# Patient Record
Sex: MALE | Race: WHITE
[De-identification: names, ages, dates, MRNs, and addresses within clinical notes are randomized per-mention and may not be internally consistent; named-entity substitution may affect disease eponyms.]

---

## 2018-06-07 ENCOUNTER — HOSPITAL ENCOUNTER (OUTPATIENT)
Age: 73
End: 2018-06-07
Payer: MEDICARE

## 2018-06-07 DIAGNOSIS — R91.1: Primary | ICD-10-CM

## 2018-06-07 PROCEDURE — 71250 CT THORAX DX C-: CPT

## 2018-06-27 ENCOUNTER — HOSPITAL ENCOUNTER (OUTPATIENT)
Age: 73
End: 2018-06-27
Payer: MEDICARE

## 2018-06-27 DIAGNOSIS — F11.20: Primary | ICD-10-CM

## 2018-06-27 LAB
AMPHET UR-MCNC: NEGATIVE NG/ML
BARBITURATE URINE VISTA: NEGATIVE
BENZODIAZEPINE URINE VISTA: NEGATIVE
COCAINE URINE VISTA: NEGATIVE
DRUG CONFIRMATION TO FOLLOW?: (no result)
ECSTACY URINE VISTA: NEGATIVE
METHADONE URINE VISTA: NEGATIVE
PCP UR QL: NEGATIVE
PH UR: 5 [PH]
THC URINE VISTA: NEGATIVE

## 2018-06-27 PROCEDURE — 80307 DRUG TEST PRSMV CHEM ANLYZR: CPT

## 2019-02-08 ENCOUNTER — HOSPITAL ENCOUNTER (OUTPATIENT)
Age: 74
End: 2019-02-08
Payer: MEDICARE

## 2019-02-08 VITALS — BODY MASS INDEX: 33.9 KG/M2

## 2019-02-08 DIAGNOSIS — J44.9: Primary | ICD-10-CM

## 2019-02-08 DIAGNOSIS — R06.00: ICD-10-CM

## 2019-02-08 PROCEDURE — 71046 X-RAY EXAM CHEST 2 VIEWS: CPT

## 2019-03-19 ENCOUNTER — HOSPITAL ENCOUNTER (OUTPATIENT)
Age: 74
End: 2019-03-19
Payer: MEDICARE

## 2019-03-19 VITALS — BODY MASS INDEX: 33.9 KG/M2

## 2019-03-19 DIAGNOSIS — R06.00: ICD-10-CM

## 2019-03-19 DIAGNOSIS — R60.0: Primary | ICD-10-CM

## 2019-03-19 PROCEDURE — 93306 TTE W/DOPPLER COMPLETE: CPT

## 2019-03-19 PROCEDURE — 93970 EXTREMITY STUDY: CPT

## 2019-03-21 ENCOUNTER — HOSPITAL ENCOUNTER (OUTPATIENT)
Age: 74
End: 2019-03-21
Payer: MEDICARE

## 2019-03-21 VITALS — BODY MASS INDEX: 33.9 KG/M2

## 2019-03-21 DIAGNOSIS — R06.00: Primary | ICD-10-CM

## 2019-03-21 DIAGNOSIS — I27.20: ICD-10-CM

## 2019-03-21 DIAGNOSIS — R60.0: ICD-10-CM

## 2019-03-21 PROCEDURE — C8928 TTE W OR W/O FOL W/CON,STRES: HCPCS

## 2019-03-21 PROCEDURE — A4216 STERILE WATER/SALINE, 10 ML: HCPCS

## 2019-03-21 PROCEDURE — 93350 STRESS TTE ONLY: CPT

## 2019-03-21 PROCEDURE — 93017 CV STRESS TEST TRACING ONLY: CPT

## 2019-04-11 ENCOUNTER — HOSPITAL ENCOUNTER (OUTPATIENT)
Age: 74
End: 2019-04-11
Payer: MEDICARE

## 2019-04-11 VITALS — BODY MASS INDEX: 33.9 KG/M2

## 2019-04-11 DIAGNOSIS — M54.9: Primary | ICD-10-CM

## 2019-04-11 PROCEDURE — 72100 X-RAY EXAM L-S SPINE 2/3 VWS: CPT

## 2019-05-25 ENCOUNTER — HOSPITAL ENCOUNTER (INPATIENT)
Dept: HOSPITAL 100 - ED | Age: 74
LOS: 1 days | Discharge: ANOTHER HOSPITAL | DRG: 389 | End: 2019-05-26
Payer: MEDICARE

## 2019-05-25 VITALS
HEART RATE: 104 BPM | DIASTOLIC BLOOD PRESSURE: 75 MMHG | OXYGEN SATURATION: 95 % | SYSTOLIC BLOOD PRESSURE: 139 MMHG | RESPIRATION RATE: 18 BRPM

## 2019-05-25 VITALS
DIASTOLIC BLOOD PRESSURE: 73 MMHG | HEART RATE: 119 BPM | BODY MASS INDEX: 37.2 KG/M2 | SYSTOLIC BLOOD PRESSURE: 130 MMHG | OXYGEN SATURATION: 90 % | BODY MASS INDEX: 36.2 KG/M2 | TEMPERATURE: 96.6 F | RESPIRATION RATE: 18 BRPM

## 2019-05-25 VITALS
RESPIRATION RATE: 20 BRPM | DIASTOLIC BLOOD PRESSURE: 64 MMHG | HEART RATE: 110 BPM | SYSTOLIC BLOOD PRESSURE: 117 MMHG | OXYGEN SATURATION: 93 %

## 2019-05-25 VITALS — RESPIRATION RATE: 18 BRPM

## 2019-05-25 DIAGNOSIS — I10: ICD-10-CM

## 2019-05-25 DIAGNOSIS — E78.5: ICD-10-CM

## 2019-05-25 DIAGNOSIS — J44.9: ICD-10-CM

## 2019-05-25 DIAGNOSIS — Z87.891: ICD-10-CM

## 2019-05-25 DIAGNOSIS — F32.9: ICD-10-CM

## 2019-05-25 DIAGNOSIS — K56.600: Primary | ICD-10-CM

## 2019-05-25 DIAGNOSIS — N40.0: ICD-10-CM

## 2019-05-25 DIAGNOSIS — Z85.038: ICD-10-CM

## 2019-05-25 DIAGNOSIS — J96.11: ICD-10-CM

## 2019-05-25 DIAGNOSIS — Z99.81: ICD-10-CM

## 2019-05-25 DIAGNOSIS — K21.9: ICD-10-CM

## 2019-05-25 DIAGNOSIS — F41.9: ICD-10-CM

## 2019-05-25 DIAGNOSIS — Z90.49: ICD-10-CM

## 2019-05-25 LAB
ANION GAP: 3 (ref 5–15)
BUN SERPL-MCNC: 17 MG/DL (ref 7–18)
BUN/CREAT RATIO: 17.6 RATIO (ref 10–20)
CALCIUM SERPL-MCNC: 9.5 MG/DL (ref 8.5–10.1)
CARBON DIOXIDE: 37 MMOL/L (ref 21–32)
CHLORIDE: 102 MMOL/L (ref 98–107)
DEPRECATED RDW RBC: 46.5 FL (ref 35.1–43.9)
DIFFERENTIAL INDICATED: (no result)
ERYTHROCYTE [DISTWIDTH] IN BLOOD: 13.3 % (ref 11.6–14.6)
EST GLOM FILT RATE - AFR AMER: 98 ML/MIN (ref 60–?)
ESTIMATED CREATININE CLEARANCE: 67.58 ML/MIN
GLUCOSE: 113 MG/DL (ref 74–106)
HCT VFR BLD AUTO: 48.6 % (ref 40–54)
HEMOGLOBIN: 15.2 G/DL (ref 13–16.5)
HGB BLD-MCNC: 15.2 G/DL (ref 13–16.5)
IMMATURE GRANULOCYTES COUNT: 0.04 X10^3/UL (ref 0–0)
MCV RBC: 95.5 FL (ref 80–94)
MEAN CORP HGB CONC: 31.3 G/GL (ref 32–36)
MEAN PLATELET VOL.: 10.2 FL (ref 6.2–12)
PLATELET # BLD: 246 K/MM3 (ref 150–450)
PLATELET COUNT: 246 K/MM3 (ref 150–450)
POSITIVE COUNT: NO
POSITIVE DIFFERENTIAL: NO
POSITIVE MORPHOLOGY: NO
POTASSIUM: 3.9 MMOL/L (ref 3.5–5.1)
RBC # BLD AUTO: 5.09 M/MM3 (ref 4.6–6.2)
RBC DISTRIBUTION WIDTH CV: 13.3 % (ref 11.6–14.6)
RBC DISTRIBUTION WIDTH SD: 46.5 FL (ref 35.1–43.9)
WBC # BLD AUTO: 18.7 K/MM3 (ref 4.4–11)
WHITE BLOOD COUNT: 18.7 K/MM3 (ref 4.4–11)

## 2019-05-25 PROCEDURE — 99285 EMERGENCY DEPT VISIT HI MDM: CPT

## 2019-05-25 PROCEDURE — 74018 RADEX ABDOMEN 1 VIEW: CPT

## 2019-05-25 PROCEDURE — 83735 ASSAY OF MAGNESIUM: CPT

## 2019-05-25 PROCEDURE — A4216 STERILE WATER/SALINE, 10 ML: HCPCS

## 2019-05-25 PROCEDURE — 85025 COMPLETE CBC W/AUTO DIFF WBC: CPT

## 2019-05-25 PROCEDURE — 74176 CT ABD & PELVIS W/O CONTRAST: CPT

## 2019-05-25 PROCEDURE — 80053 COMPREHEN METABOLIC PANEL: CPT

## 2019-05-25 PROCEDURE — 74022 RADEX COMPL AQT ABD SERIES: CPT

## 2019-05-25 PROCEDURE — 36415 COLL VENOUS BLD VENIPUNCTURE: CPT

## 2019-05-25 PROCEDURE — 80048 BASIC METABOLIC PNL TOTAL CA: CPT

## 2019-05-25 PROCEDURE — 84443 ASSAY THYROID STIM HORMONE: CPT

## 2019-05-25 RX ADMIN — SODIUM CHLORIDE 1000 ML: 9 INJECTION, SOLUTION INTRAVENOUS at 18:16

## 2019-05-26 VITALS
TEMPERATURE: 98.06 F | HEART RATE: 103 BPM | RESPIRATION RATE: 20 BRPM | OXYGEN SATURATION: 93 % | DIASTOLIC BLOOD PRESSURE: 68 MMHG | SYSTOLIC BLOOD PRESSURE: 127 MMHG

## 2019-05-26 VITALS
RESPIRATION RATE: 18 BRPM | HEART RATE: 82 BPM | TEMPERATURE: 98.4 F | OXYGEN SATURATION: 94 % | DIASTOLIC BLOOD PRESSURE: 66 MMHG | SYSTOLIC BLOOD PRESSURE: 117 MMHG

## 2019-05-26 VITALS
RESPIRATION RATE: 18 BRPM | SYSTOLIC BLOOD PRESSURE: 106 MMHG | TEMPERATURE: 98.78 F | DIASTOLIC BLOOD PRESSURE: 72 MMHG | OXYGEN SATURATION: 96 % | HEART RATE: 101 BPM

## 2019-05-26 LAB
ALANINE AMINOTRANSFER ALT/SGPT: 23 U/L (ref 16–61)
ALBUMIN SERPL-MCNC: 3.1 G/DL (ref 3.2–5)
ALKALINE PHOSPHATASE: 67 U/L (ref 45–117)
ANION GAP: 5 (ref 5–15)
AST(SGOT): 18 U/L (ref 15–37)
BUN SERPL-MCNC: 26 MG/DL (ref 7–18)
BUN/CREAT RATIO: 17.9 RATIO (ref 10–20)
CALCIUM SERPL-MCNC: 8.5 MG/DL (ref 8.5–10.1)
CARBON DIOXIDE: 35 MMOL/L (ref 21–32)
CHLORIDE: 105 MMOL/L (ref 98–107)
DEPRECATED RDW RBC: 47.8 FL (ref 35.1–43.9)
DIFFERENTIAL COMMENT: (no result)
DIFFERENTIAL INDICATED: (no result)
ERYTHROCYTE [DISTWIDTH] IN BLOOD: 13.6 % (ref 11.6–14.6)
EST GLOM FILT RATE - AFR AMER: 62 ML/MIN (ref 60–?)
ESTIMATED CREATININE CLEARANCE: 45.21 ML/MIN
GLOBULIN: 4 G/DL (ref 2.2–4.2)
GLUCOSE: 137 MG/DL (ref 74–106)
HCT VFR BLD AUTO: 48.1 % (ref 40–54)
HEMOGLOBIN: 14.2 G/DL (ref 13–16.5)
HGB BLD-MCNC: 14.2 G/DL (ref 13–16.5)
IMMATURE GRANULOCYTES COUNT: 0.07 X10^3/UL (ref 0–0)
MAGNESIUM: 1.9 MG/DL (ref 1.6–2.6)
MAGNESIUM: 1.9 MG/DL (ref 1.6–2.6)
MANUAL DIF COMMENT BLD-IMP: (no result)
MCV RBC: 97.8 FL (ref 80–94)
MEAN CORP HGB CONC: 29.5 G/GL (ref 32–36)
MEAN PLATELET VOL.: 10.3 FL (ref 6.2–12)
PLATELET # BLD: 218 K/MM3 (ref 150–450)
PLATELET COUNT: 218 K/MM3 (ref 150–450)
POSITIVE COUNT: NO
POSITIVE DIFFERENTIAL: YES
POSITIVE MORPHOLOGY: NO
POTASSIUM: 4.5 MMOL/L (ref 3.5–5.1)
RBC # BLD AUTO: 4.92 M/MM3 (ref 4.6–6.2)
RBC DISTRIBUTION WIDTH CV: 13.6 % (ref 11.6–14.6)
RBC DISTRIBUTION WIDTH SD: 47.8 FL (ref 35.1–43.9)
SCAN SMEAR PER REVIEW CRITERIA: (no result)
WBC # BLD AUTO: 23 K/MM3 (ref 4.4–11)
WHITE BLOOD COUNT: 23 K/MM3 (ref 4.4–11)

## 2019-05-26 RX ADMIN — SODIUM CHLORIDE, PRESERVATIVE FREE 0 ML: 5 INJECTION INTRAVENOUS at 06:55

## 2019-05-26 RX ADMIN — SODIUM CHLORIDE, PRESERVATIVE FREE 0 ML: 5 INJECTION INTRAVENOUS at 01:13

## 2019-06-07 ENCOUNTER — HOSPITAL ENCOUNTER (OUTPATIENT)
Dept: HOSPITAL 100 - CT | Age: 74
Discharge: HOME | End: 2019-06-07
Payer: MEDICARE

## 2019-06-07 VITALS — BODY MASS INDEX: 36.2 KG/M2

## 2019-06-07 DIAGNOSIS — R91.1: Primary | ICD-10-CM

## 2019-06-07 PROCEDURE — 71250 CT THORAX DX C-: CPT

## 2019-06-17 ENCOUNTER — HOSPITAL ENCOUNTER (OUTPATIENT)
Dept: HOSPITAL 100 - MTLAB | Age: 74
Discharge: HOME | End: 2019-06-17
Payer: MEDICARE

## 2019-06-17 VITALS — BODY MASS INDEX: 37.2 KG/M2

## 2019-06-17 DIAGNOSIS — J44.9: Primary | ICD-10-CM

## 2019-06-17 DIAGNOSIS — R09.02: ICD-10-CM

## 2019-06-17 LAB
DEPRECATED RDW RBC: 44.9 FL (ref 35.1–43.9)
DIFFERENTIAL INDICATED: (no result)
ERYTHROCYTE [DISTWIDTH] IN BLOOD: 13 % (ref 11.6–14.6)
HCT VFR BLD AUTO: 42.3 % (ref 40–54)
HEMOGLOBIN: 13 G/DL (ref 13–16.5)
HGB BLD-MCNC: 13 G/DL (ref 13–16.5)
IMMATURE GRANULOCYTES COUNT: 0.01 X10^3/UL (ref 0–0)
MCV RBC: 94.4 FL (ref 80–94)
MEAN CORP HGB CONC: 30.7 G/GL (ref 32–36)
MEAN PLATELET VOL.: 10.6 FL (ref 6.2–12)
PLATELET # BLD: 291 K/MM3 (ref 150–450)
PLATELET COUNT: 291 K/MM3 (ref 150–450)
POSITIVE COUNT: NO
POSITIVE DIFFERENTIAL: NO
POSITIVE MORPHOLOGY: NO
RBC # BLD AUTO: 4.48 M/MM3 (ref 4.6–6.2)
RBC DISTRIBUTION WIDTH CV: 13 % (ref 11.6–14.6)
RBC DISTRIBUTION WIDTH SD: 44.9 FL (ref 35.1–43.9)
WBC # BLD AUTO: 9.8 K/MM3 (ref 4.4–11)
WHITE BLOOD COUNT: 9.8 K/MM3 (ref 4.4–11)

## 2019-06-17 PROCEDURE — 85025 COMPLETE CBC W/AUTO DIFF WBC: CPT

## 2019-06-17 PROCEDURE — 36415 COLL VENOUS BLD VENIPUNCTURE: CPT

## 2019-06-18 ENCOUNTER — HOSPITAL ENCOUNTER (OUTPATIENT)
Dept: HOSPITAL 100 - MTLAB | Age: 74
Discharge: HOME | End: 2019-06-18
Payer: MEDICARE

## 2019-06-18 VITALS — BODY MASS INDEX: 37.2 KG/M2

## 2019-06-18 DIAGNOSIS — L40.0: Primary | ICD-10-CM

## 2019-06-18 LAB
ALANINE AMINOTRANSFER ALT/SGPT: 21 U/L (ref 16–61)
ALBUMIN SERPL-MCNC: 3.5 G/DL (ref 3.2–5)
ALKALINE PHOSPHATASE: 72 U/L (ref 45–117)
ANION GAP: 5 (ref 5–15)
AST(SGOT): 16 U/L (ref 15–37)
BUN SERPL-MCNC: 16 MG/DL (ref 7–18)
BUN/CREAT RATIO: 18.1 RATIO (ref 10–20)
CALCIUM SERPL-MCNC: 9.3 MG/DL (ref 8.5–10.1)
CARBON DIOXIDE: 33 MMOL/L (ref 21–32)
CHLORIDE: 103 MMOL/L (ref 98–107)
DEPRECATED RDW RBC: 42.8 FL (ref 35.1–43.9)
DIFFERENTIAL INDICATED: (no result)
ERYTHROCYTE [DISTWIDTH] IN BLOOD: 12.9 % (ref 11.6–14.6)
EST GLOM FILT RATE - AFR AMER: 109 ML/MIN (ref 60–?)
GLOBULIN: 3.7 G/DL (ref 2.2–4.2)
GLUCOSE: 98 MG/DL (ref 74–106)
HCT VFR BLD AUTO: 42.4 % (ref 40–54)
HEMOGLOBIN: 13.2 G/DL (ref 13–16.5)
HGB BLD-MCNC: 13.2 G/DL (ref 13–16.5)
IMMATURE GRANULOCYTES COUNT: 0.02 X10^3/UL (ref 0–0)
MCV RBC: 93.2 FL (ref 80–94)
MEAN CORP HGB CONC: 31.1 G/GL (ref 32–36)
MEAN PLATELET VOL.: 11 FL (ref 6.2–12)
PLATELET # BLD: 283 K/MM3 (ref 150–450)
PLATELET COUNT: 283 K/MM3 (ref 150–450)
POSITIVE COUNT: NO
POSITIVE DIFFERENTIAL: NO
POSITIVE MORPHOLOGY: NO
POTASSIUM: 3.8 MMOL/L (ref 3.5–5.1)
RBC # BLD AUTO: 4.55 M/MM3 (ref 4.6–6.2)
RBC DISTRIBUTION WIDTH CV: 12.9 % (ref 11.6–14.6)
RBC DISTRIBUTION WIDTH SD: 42.8 FL (ref 35.1–43.9)
WBC # BLD AUTO: 8.6 K/MM3 (ref 4.4–11)
WHITE BLOOD COUNT: 8.6 K/MM3 (ref 4.4–11)

## 2019-06-18 PROCEDURE — 36415 COLL VENOUS BLD VENIPUNCTURE: CPT

## 2019-06-18 PROCEDURE — 80053 COMPREHEN METABOLIC PANEL: CPT

## 2019-06-18 PROCEDURE — 86480 TB TEST CELL IMMUN MEASURE: CPT

## 2019-06-18 PROCEDURE — 85025 COMPLETE CBC W/AUTO DIFF WBC: CPT

## 2019-06-20 LAB
M TB TUBERC IFN-G BLD QL: 0.02 IU/ML
QNTFERON TB MITOGEN VALUE: > 10 IU/ML
QNTFERON TB NIL VALUE: 0.02 IU/ML
QNTFERON TB2+ AG VALUE: 0.01 IU/ML

## 2019-12-07 ENCOUNTER — HOSPITAL ENCOUNTER (INPATIENT)
Dept: HOSPITAL 100 - ED | Age: 74
LOS: 2 days | Discharge: HOME | DRG: 871 | End: 2019-12-09
Payer: MEDICARE

## 2019-12-07 VITALS — RESPIRATION RATE: 28 BRPM | OXYGEN SATURATION: 92 % | HEART RATE: 122 BPM

## 2019-12-07 VITALS
TEMPERATURE: 99.6 F | DIASTOLIC BLOOD PRESSURE: 74 MMHG | HEART RATE: 122 BPM | SYSTOLIC BLOOD PRESSURE: 155 MMHG | OXYGEN SATURATION: 92 % | RESPIRATION RATE: 28 BRPM

## 2019-12-07 VITALS — BODY MASS INDEX: 38.8 KG/M2 | BODY MASS INDEX: 37.2 KG/M2 | BODY MASS INDEX: 37.5 KG/M2 | BODY MASS INDEX: 37.6 KG/M2

## 2019-12-07 VITALS — OXYGEN SATURATION: 93 %

## 2019-12-07 DIAGNOSIS — N40.0: ICD-10-CM

## 2019-12-07 DIAGNOSIS — J96.21: ICD-10-CM

## 2019-12-07 DIAGNOSIS — I10: ICD-10-CM

## 2019-12-07 DIAGNOSIS — E78.5: ICD-10-CM

## 2019-12-07 DIAGNOSIS — F41.9: ICD-10-CM

## 2019-12-07 DIAGNOSIS — F32.9: ICD-10-CM

## 2019-12-07 DIAGNOSIS — J10.00: ICD-10-CM

## 2019-12-07 DIAGNOSIS — J44.0: ICD-10-CM

## 2019-12-07 DIAGNOSIS — K21.9: ICD-10-CM

## 2019-12-07 DIAGNOSIS — E66.9: ICD-10-CM

## 2019-12-07 DIAGNOSIS — Z90.49: ICD-10-CM

## 2019-12-07 DIAGNOSIS — J44.1: ICD-10-CM

## 2019-12-07 DIAGNOSIS — A41.89: Primary | ICD-10-CM

## 2019-12-07 DIAGNOSIS — Z85.038: ICD-10-CM

## 2019-12-07 DIAGNOSIS — Z99.81: ICD-10-CM

## 2019-12-07 DIAGNOSIS — Z87.891: ICD-10-CM

## 2019-12-07 LAB
DEPRECATED RDW RBC: 45.6 FL (ref 35.1–43.9)
DIFFERENTIAL INDICATED: (no result)
ERYTHROCYTE [DISTWIDTH] IN BLOOD: 12.9 % (ref 11.6–14.6)
HCT VFR BLD AUTO: 39.7 % (ref 40–54)
HEMOGLOBIN: 12.4 G/DL (ref 13–16.5)
HGB BLD-MCNC: 12.4 G/DL (ref 13–16.5)
IMMATURE GRANULOCYTES COUNT: 0.1 X10^3/UL (ref 0–0)
MCV RBC: 96.1 FL (ref 80–94)
MEAN CORP HGB CONC: 31.2 G/DL (ref 32–36)
MEAN PLATELET VOL.: 10.1 FL (ref 6.2–12)
NRBC FLAGGED BY ANALYZER: 0 % (ref 0–5)
PLATELET # BLD: 239 K/MM3 (ref 150–450)
PLATELET COUNT: 239 K/MM3 (ref 150–450)
POSITIVE DIFFERENTIAL: YES
RBC # BLD AUTO: 4.13 M/MM3 (ref 4.6–6.2)
RBC DISTRIBUTION WIDTH CV: 12.9 % (ref 11.6–14.6)
RBC DISTRIBUTION WIDTH SD: 45.6 FL (ref 35.1–43.9)
WBC # BLD AUTO: 20.2 K/MM3 (ref 4.4–11)
WHITE BLOOD COUNT: 20.2 K/MM3 (ref 4.4–11)

## 2019-12-07 PROCEDURE — 87070 CULTURE OTHR SPECIMN AEROBIC: CPT

## 2019-12-07 PROCEDURE — 99285 EMERGENCY DEPT VISIT HI MDM: CPT

## 2019-12-07 PROCEDURE — 84484 ASSAY OF TROPONIN QUANT: CPT

## 2019-12-07 PROCEDURE — G0463 HOSPITAL OUTPT CLINIC VISIT: HCPCS

## 2019-12-07 PROCEDURE — 97162 PT EVAL MOD COMPLEX 30 MIN: CPT

## 2019-12-07 PROCEDURE — 80048 BASIC METABOLIC PNL TOTAL CA: CPT

## 2019-12-07 PROCEDURE — 83605 ASSAY OF LACTIC ACID: CPT

## 2019-12-07 PROCEDURE — 99251: CPT

## 2019-12-07 PROCEDURE — 93005 ELECTROCARDIOGRAM TRACING: CPT

## 2019-12-07 PROCEDURE — 71045 X-RAY EXAM CHEST 1 VIEW: CPT

## 2019-12-07 PROCEDURE — 36415 COLL VENOUS BLD VENIPUNCTURE: CPT

## 2019-12-07 PROCEDURE — 87804 INFLUENZA ASSAY W/OPTIC: CPT

## 2019-12-07 PROCEDURE — 85025 COMPLETE CBC W/AUTO DIFF WBC: CPT

## 2019-12-07 PROCEDURE — 87040 BLOOD CULTURE FOR BACTERIA: CPT

## 2019-12-07 PROCEDURE — 87205 SMEAR GRAM STAIN: CPT

## 2019-12-07 PROCEDURE — A4216 STERILE WATER/SALINE, 10 ML: HCPCS

## 2019-12-07 PROCEDURE — 94640 AIRWAY INHALATION TREATMENT: CPT

## 2019-12-08 VITALS
OXYGEN SATURATION: 94 % | RESPIRATION RATE: 22 BRPM | SYSTOLIC BLOOD PRESSURE: 140 MMHG | TEMPERATURE: 100.76 F | DIASTOLIC BLOOD PRESSURE: 92 MMHG | HEART RATE: 118 BPM

## 2019-12-08 VITALS — HEART RATE: 92 BPM | RESPIRATION RATE: 18 BRPM

## 2019-12-08 VITALS
DIASTOLIC BLOOD PRESSURE: 67 MMHG | SYSTOLIC BLOOD PRESSURE: 129 MMHG | RESPIRATION RATE: 18 BRPM | HEART RATE: 94 BPM | OXYGEN SATURATION: 94 % | TEMPERATURE: 98.2 F

## 2019-12-08 VITALS
DIASTOLIC BLOOD PRESSURE: 70 MMHG | OXYGEN SATURATION: 93 % | HEART RATE: 97 BPM | TEMPERATURE: 98.06 F | RESPIRATION RATE: 22 BRPM | SYSTOLIC BLOOD PRESSURE: 136 MMHG

## 2019-12-08 VITALS — HEART RATE: 101 BPM

## 2019-12-08 VITALS
TEMPERATURE: 98.42 F | OXYGEN SATURATION: 92 % | DIASTOLIC BLOOD PRESSURE: 57 MMHG | RESPIRATION RATE: 26 BRPM | HEART RATE: 119 BPM | SYSTOLIC BLOOD PRESSURE: 135 MMHG

## 2019-12-08 VITALS — RESPIRATION RATE: 18 BRPM | HEART RATE: 100 BPM | OXYGEN SATURATION: 93 %

## 2019-12-08 VITALS
RESPIRATION RATE: 20 BRPM | SYSTOLIC BLOOD PRESSURE: 136 MMHG | OXYGEN SATURATION: 93 % | TEMPERATURE: 97.7 F | DIASTOLIC BLOOD PRESSURE: 64 MMHG | HEART RATE: 101 BPM

## 2019-12-08 VITALS
RESPIRATION RATE: 22 BRPM | OXYGEN SATURATION: 92 % | HEART RATE: 121 BPM | DIASTOLIC BLOOD PRESSURE: 57 MMHG | SYSTOLIC BLOOD PRESSURE: 135 MMHG

## 2019-12-08 VITALS
DIASTOLIC BLOOD PRESSURE: 65 MMHG | TEMPERATURE: 98.42 F | HEART RATE: 106 BPM | OXYGEN SATURATION: 96 % | RESPIRATION RATE: 24 BRPM | SYSTOLIC BLOOD PRESSURE: 148 MMHG

## 2019-12-08 VITALS
HEART RATE: 95 BPM | TEMPERATURE: 98 F | OXYGEN SATURATION: 94 % | SYSTOLIC BLOOD PRESSURE: 138 MMHG | RESPIRATION RATE: 18 BRPM | DIASTOLIC BLOOD PRESSURE: 70 MMHG

## 2019-12-08 VITALS — RESPIRATION RATE: 18 BRPM | OXYGEN SATURATION: 94 % | HEART RATE: 100 BPM

## 2019-12-08 VITALS
HEART RATE: 109 BPM | TEMPERATURE: 99.1 F | SYSTOLIC BLOOD PRESSURE: 124 MMHG | DIASTOLIC BLOOD PRESSURE: 49 MMHG | RESPIRATION RATE: 24 BRPM | OXYGEN SATURATION: 92 %

## 2019-12-08 VITALS — HEART RATE: 110 BPM | RESPIRATION RATE: 18 BRPM | OXYGEN SATURATION: 92 %

## 2019-12-08 VITALS
SYSTOLIC BLOOD PRESSURE: 141 MMHG | RESPIRATION RATE: 20 BRPM | OXYGEN SATURATION: 94 % | HEART RATE: 96 BPM | DIASTOLIC BLOOD PRESSURE: 67 MMHG | TEMPERATURE: 98.1 F

## 2019-12-08 VITALS
SYSTOLIC BLOOD PRESSURE: 153 MMHG | HEART RATE: 98 BPM | OXYGEN SATURATION: 94 % | TEMPERATURE: 97.7 F | DIASTOLIC BLOOD PRESSURE: 70 MMHG | RESPIRATION RATE: 20 BRPM

## 2019-12-08 VITALS — RESPIRATION RATE: 21 BRPM | HEART RATE: 90 BPM

## 2019-12-08 VITALS — HEART RATE: 86 BPM

## 2019-12-08 VITALS — HEART RATE: 109 BPM

## 2019-12-08 VITALS — HEART RATE: 98 BPM

## 2019-12-08 VITALS — SYSTOLIC BLOOD PRESSURE: 144 MMHG | DIASTOLIC BLOOD PRESSURE: 70 MMHG

## 2019-12-08 LAB
ANION GAP: 2 (ref 5–15)
ANION GAP: 3 (ref 5–15)
BUN SERPL-MCNC: 19 MG/DL (ref 7–18)
BUN SERPL-MCNC: 20 MG/DL (ref 7–18)
BUN/CREAT RATIO: 16.4 RATIO (ref 10–20)
BUN/CREAT RATIO: 18.3 RATIO (ref 10–20)
CALCIUM SERPL-MCNC: 8.4 MG/DL (ref 8.5–10.1)
CALCIUM SERPL-MCNC: 8.9 MG/DL (ref 8.5–10.1)
CARBON DIOXIDE: 35 MMOL/L (ref 21–32)
CARBON DIOXIDE: 36 MMOL/L (ref 21–32)
CHLORIDE: 102 MMOL/L (ref 98–107)
CHLORIDE: 102 MMOL/L (ref 98–107)
DEPRECATED RDW RBC: 47.2 FL (ref 35.1–43.9)
DIFFERENTIAL INDICATED: (no result)
ERYTHROCYTE [DISTWIDTH] IN BLOOD: 13.2 % (ref 11.6–14.6)
EST GLOM FILT RATE - AFR AMER: 75 ML/MIN (ref 60–?)
EST GLOM FILT RATE - AFR AMER: 90 ML/MIN (ref 60–?)
ESTIMATED CREATININE CLEARANCE: 52.95 ML/MIN
ESTIMATED CREATININE CLEARANCE: 62.12 ML/MIN
GLUCOSE: 123 MG/DL (ref 74–106)
GLUCOSE: 166 MG/DL (ref 74–106)
HCT VFR BLD AUTO: 37.7 % (ref 40–54)
HEMOGLOBIN: 12.1 G/DL (ref 13–16.5)
HGB BLD-MCNC: 12.1 G/DL (ref 13–16.5)
IMMATURE GRANULOCYTES COUNT: 0.12 X10^3/UL (ref 0–0)
MCV RBC: 96.4 FL (ref 80–94)
MEAN CORP HGB CONC: 32.1 G/DL (ref 32–36)
MEAN PLATELET VOL.: 10.4 FL (ref 6.2–12)
NRBC FLAGGED BY ANALYZER: 0 % (ref 0–5)
PLATELET # BLD: 227 K/MM3 (ref 150–450)
PLATELET COUNT: 227 K/MM3 (ref 150–450)
POSITIVE DIFFERENTIAL: YES
POTASSIUM: 3.3 MMOL/L (ref 3.5–5.1)
POTASSIUM: 3.6 MMOL/L (ref 3.5–5.1)
RBC # BLD AUTO: 3.91 M/MM3 (ref 4.6–6.2)
RBC DISTRIBUTION WIDTH CV: 13.2 % (ref 11.6–14.6)
RBC DISTRIBUTION WIDTH SD: 47.2 FL (ref 35.1–43.9)
SCAN SMEAR PER REVIEW CRITERIA: (no result)
SCAN SMEAR PER REVIEW CRITERIA: (no result)
WBC # BLD AUTO: 22.6 K/MM3 (ref 4.4–11)
WHITE BLOOD COUNT: 22.6 K/MM3 (ref 4.4–11)

## 2019-12-08 RX ADMIN — SODIUM CHLORIDE 100 ML: 9 INJECTION, SOLUTION INTRAVENOUS at 03:31

## 2019-12-08 RX ADMIN — SODIUM CHLORIDE, PRESERVATIVE FREE 0 ML: 5 INJECTION INTRAVENOUS at 21:01

## 2019-12-08 RX ADMIN — SODIUM CHLORIDE, PRESERVATIVE FREE 0 ML: 5 INJECTION INTRAVENOUS at 23:36

## 2019-12-08 RX ADMIN — SODIUM CHLORIDE 100 ML: 9 INJECTION, SOLUTION INTRAVENOUS at 23:36

## 2019-12-08 RX ADMIN — ALBUTEROL SULFATE 2.5 MG: 2.5 SOLUTION RESPIRATORY (INHALATION) at 00:05

## 2019-12-08 RX ADMIN — ALBUTEROL SULFATE 2.5 MG: 2.5 SOLUTION RESPIRATORY (INHALATION) at 00:41

## 2019-12-08 RX ADMIN — ALBUTEROL SULFATE 2.5 MG: 2.5 SOLUTION RESPIRATORY (INHALATION) at 01:06

## 2019-12-08 RX ADMIN — SODIUM CHLORIDE 100 ML: 9 INJECTION, SOLUTION INTRAVENOUS at 12:38

## 2019-12-09 VITALS
DIASTOLIC BLOOD PRESSURE: 62 MMHG | RESPIRATION RATE: 18 BRPM | HEART RATE: 92 BPM | SYSTOLIC BLOOD PRESSURE: 149 MMHG | TEMPERATURE: 97.7 F | OXYGEN SATURATION: 95 %

## 2019-12-09 VITALS — RESPIRATION RATE: 18 BRPM | HEART RATE: 92 BPM | OXYGEN SATURATION: 93 %

## 2019-12-09 VITALS — HEART RATE: 93 BPM

## 2019-12-09 VITALS
OXYGEN SATURATION: 94 % | SYSTOLIC BLOOD PRESSURE: 136 MMHG | TEMPERATURE: 97.88 F | RESPIRATION RATE: 20 BRPM | HEART RATE: 103 BPM | DIASTOLIC BLOOD PRESSURE: 64 MMHG

## 2019-12-09 VITALS — HEART RATE: 86 BPM

## 2019-12-09 VITALS — HEART RATE: 101 BPM | RESPIRATION RATE: 18 BRPM

## 2019-12-09 VITALS — HEART RATE: 137 BPM

## 2019-12-09 VITALS — HEART RATE: 107 BPM | RESPIRATION RATE: 18 BRPM

## 2019-12-09 VITALS — OXYGEN SATURATION: 90 %

## 2019-12-09 LAB
ANION GAP: 1 (ref 5–15)
BUN SERPL-MCNC: 19 MG/DL (ref 7–18)
BUN/CREAT RATIO: 20.7 RATIO (ref 10–20)
CALCIUM SERPL-MCNC: 8.8 MG/DL (ref 8.5–10.1)
CARBON DIOXIDE: 36 MMOL/L (ref 21–32)
CHLORIDE: 103 MMOL/L (ref 98–107)
DEPRECATED RDW RBC: 47.8 FL (ref 35.1–43.9)
ERYTHROCYTE [DISTWIDTH] IN BLOOD: 13.3 % (ref 11.6–14.6)
EST GLOM FILT RATE - AFR AMER: 104 ML/MIN (ref 60–?)
ESTIMATED CREATININE CLEARANCE: 70.22 ML/MIN
GLUCOSE: 124 MG/DL (ref 74–106)
HCT VFR BLD AUTO: 37.4 % (ref 40–54)
HEMOGLOBIN: 11.2 G/DL (ref 13–16.5)
HGB BLD-MCNC: 11.2 G/DL (ref 13–16.5)
IMMATURE GRANULOCYTES COUNT: 0.11 X10^3/UL (ref 0–0)
MCV RBC: 98.2 FL (ref 80–94)
MEAN CORP HGB CONC: 29.9 G/DL (ref 32–36)
MEAN PLATELET VOL.: 10.8 FL (ref 6.2–12)
NRBC FLAGGED BY ANALYZER: 0 % (ref 0–5)
PLATELET # BLD: 249 K/MM3 (ref 150–450)
PLATELET COUNT: 249 K/MM3 (ref 150–450)
POTASSIUM: 4.2 MMOL/L (ref 3.5–5.1)
RBC # BLD AUTO: 3.81 M/MM3 (ref 4.6–6.2)
RBC DISTRIBUTION WIDTH CV: 13.3 % (ref 11.6–14.6)
RBC DISTRIBUTION WIDTH SD: 47.8 FL (ref 35.1–43.9)
WBC # BLD AUTO: 20.2 K/MM3 (ref 4.4–11)
WHITE BLOOD COUNT: 20.2 K/MM3 (ref 4.4–11)

## 2019-12-09 RX ADMIN — SODIUM CHLORIDE 100 ML: 9 INJECTION, SOLUTION INTRAVENOUS at 09:02

## 2019-12-23 ENCOUNTER — HOSPITAL ENCOUNTER (OUTPATIENT)
Age: 74
End: 2019-12-23
Payer: MEDICARE

## 2019-12-23 VITALS — BODY MASS INDEX: 37.5 KG/M2

## 2019-12-23 DIAGNOSIS — J44.0: Primary | ICD-10-CM

## 2019-12-23 DIAGNOSIS — J18.9: ICD-10-CM

## 2019-12-23 PROCEDURE — 71046 X-RAY EXAM CHEST 2 VIEWS: CPT

## 2020-02-18 ENCOUNTER — HOSPITAL ENCOUNTER (OUTPATIENT)
Age: 75
End: 2020-02-18
Payer: MEDICARE

## 2020-02-18 VITALS — BODY MASS INDEX: 37.5 KG/M2

## 2020-02-18 DIAGNOSIS — J98.11: ICD-10-CM

## 2020-02-18 DIAGNOSIS — J44.9: Primary | ICD-10-CM

## 2020-02-18 PROCEDURE — 71046 X-RAY EXAM CHEST 2 VIEWS: CPT

## 2020-03-02 ENCOUNTER — HOSPITAL ENCOUNTER (OUTPATIENT)
Age: 75
End: 2020-03-02
Payer: MEDICARE

## 2020-03-02 VITALS — BODY MASS INDEX: 37.5 KG/M2

## 2020-03-02 DIAGNOSIS — J98.11: ICD-10-CM

## 2020-03-02 DIAGNOSIS — J44.9: Primary | ICD-10-CM

## 2020-03-02 DIAGNOSIS — R93.89: ICD-10-CM

## 2020-03-02 LAB
CREATININE FINGERSTICK: 1 MG/DL (ref 0.7–1.3)
EGFR FINGERSTICK: > 60 ML/MIN (ref 60–?)

## 2020-03-02 PROCEDURE — 71260 CT THORAX DX C+: CPT

## 2020-03-05 ENCOUNTER — HOSPITAL ENCOUNTER (OUTPATIENT)
Age: 75
End: 2020-03-05
Payer: MEDICARE

## 2020-03-05 VITALS — BODY MASS INDEX: 37.5 KG/M2

## 2020-03-05 DIAGNOSIS — J44.9: Primary | ICD-10-CM

## 2020-03-05 PROCEDURE — 86480 TB TEST CELL IMMUN MEASURE: CPT

## 2020-03-05 PROCEDURE — 36415 COLL VENOUS BLD VENIPUNCTURE: CPT

## 2020-03-08 LAB
M TB TUBERC IFN-G BLD QL: 0.02 IU/ML
QNTFERON TB MITOGEN VALUE: > 10 IU/ML
QNTFERON TB NIL VALUE: 0.01 IU/ML
QNTFERON TB2+ AG VALUE: 0.02 IU/ML

## 2020-06-02 ENCOUNTER — HOSPITAL ENCOUNTER (OUTPATIENT)
Age: 75
End: 2020-06-02
Payer: MEDICARE

## 2020-06-02 VITALS — BODY MASS INDEX: 37.5 KG/M2

## 2020-06-02 DIAGNOSIS — R91.1: Primary | ICD-10-CM

## 2020-06-02 PROCEDURE — 71250 CT THORAX DX C-: CPT

## 2020-07-21 ENCOUNTER — HOSPITAL ENCOUNTER (OUTPATIENT)
Age: 75
End: 2020-07-21
Payer: MEDICARE

## 2020-07-21 VITALS — BODY MASS INDEX: 37.5 KG/M2

## 2020-07-21 DIAGNOSIS — E78.5: ICD-10-CM

## 2020-07-21 DIAGNOSIS — I10: Primary | ICD-10-CM

## 2020-07-21 DIAGNOSIS — M10.9: ICD-10-CM

## 2020-07-21 DIAGNOSIS — E55.9: ICD-10-CM

## 2020-07-21 LAB
ALANINE AMINOTRANSFER ALT/SGPT: 21 U/L (ref 16–61)
ALBUMIN SERPL-MCNC: 3.4 G/DL (ref 3.2–5)
ALKALINE PHOSPHATASE: 73 U/L (ref 45–117)
ANION GAP: 0 (ref 5–15)
AST(SGOT): 14 U/L (ref 15–37)
BUN SERPL-MCNC: 15 MG/DL (ref 7–18)
BUN/CREAT RATIO: 15.2 RATIO (ref 10–20)
CALCIUM SERPL-MCNC: 9 MG/DL (ref 8.5–10.1)
CARBON DIOXIDE: 40 MMOL/L (ref 21–32)
CHLORIDE: 101 MMOL/L (ref 98–107)
CHOLEST SERPL-MCNC: 143 MG/DL
DEPRECATED RDW RBC: 44.1 FL (ref 35.1–43.9)
ERYTHROCYTE [DISTWIDTH] IN BLOOD: 12.2 % (ref 11.6–14.6)
EST GLOM FILT RATE - AFR AMER: 96 ML/MIN (ref 60–?)
GLOBULIN: 3.8 G/DL (ref 2.2–4.2)
GLUCOSE: 95 MG/DL (ref 74–106)
HCT VFR BLD AUTO: 42.8 % (ref 40–54)
HEMOGLOBIN: 13 G/DL (ref 13–16.5)
HGB BLD-MCNC: 13 G/DL (ref 13–16.5)
MCV RBC: 97.7 FL (ref 80–94)
MEAN CORP HGB CONC: 30.4 G/DL (ref 32–36)
MEAN PLATELET VOL.: 10.1 FL (ref 6.2–12)
PLATELET # BLD: 251 K/MM3 (ref 150–450)
PLATELET COUNT: 251 K/MM3 (ref 150–450)
POTASSIUM: 3.8 MMOL/L (ref 3.5–5.1)
RBC # BLD AUTO: 4.38 M/MM3 (ref 4.6–6.2)
RBC DISTRIBUTION WIDTH CV: 12.2 % (ref 11.6–14.6)
RBC DISTRIBUTION WIDTH SD: 44.1 FL (ref 35.1–43.9)
TRIGLYCERIDES: 101 MG/DL
URATE SERPL-MCNC: 4.6 MG/DL (ref 3.5–7.2)
VLDLC SERPL-MCNC: 20 MG/DL (ref 5–40)
WBC # BLD AUTO: 10.3 K/MM3 (ref 4.4–11)
WHITE BLOOD COUNT: 10.3 K/MM3 (ref 4.4–11)

## 2020-07-21 PROCEDURE — 82306 VITAMIN D 25 HYDROXY: CPT

## 2020-07-21 PROCEDURE — 36415 COLL VENOUS BLD VENIPUNCTURE: CPT

## 2020-07-21 PROCEDURE — 80053 COMPREHEN METABOLIC PANEL: CPT

## 2020-07-21 PROCEDURE — 84550 ASSAY OF BLOOD/URIC ACID: CPT

## 2020-07-21 PROCEDURE — 80061 LIPID PANEL: CPT

## 2020-07-21 PROCEDURE — 85027 COMPLETE CBC AUTOMATED: CPT

## 2020-07-22 LAB — VITAMIN D,25 HYDROXY: 79 NG/ML

## 2021-02-10 ENCOUNTER — HOSPITAL ENCOUNTER (OUTPATIENT)
Age: 76
End: 2021-02-10
Payer: MEDICARE

## 2021-02-10 VITALS — BODY MASS INDEX: 37.5 KG/M2

## 2021-02-10 DIAGNOSIS — E55.9: ICD-10-CM

## 2021-02-10 DIAGNOSIS — E78.5: ICD-10-CM

## 2021-02-10 DIAGNOSIS — I10: Primary | ICD-10-CM

## 2021-02-10 LAB
ALANINE AMINOTRANSFER ALT/SGPT: 20 U/L (ref 16–61)
ALBUMIN SERPL-MCNC: 3.4 G/DL (ref 3.2–5)
ALKALINE PHOSPHATASE: 82 U/L (ref 45–117)
ANION GAP: 1 (ref 5–15)
AST(SGOT): 11 U/L (ref 15–37)
BUN SERPL-MCNC: 16 MG/DL (ref 7–18)
BUN/CREAT RATIO: 17.1 RATIO (ref 10–20)
CALCIUM SERPL-MCNC: 8.8 MG/DL (ref 8.5–10.1)
CARBON DIOXIDE: 39 MMOL/L (ref 21–32)
CHLORIDE: 102 MMOL/L (ref 98–107)
CHOLEST SERPL-MCNC: 170 MG/DL
DEPRECATED RDW RBC: 44.6 FL (ref 35.1–43.9)
ERYTHROCYTE [DISTWIDTH] IN BLOOD: 12.5 % (ref 11.6–14.6)
EST GLOM FILT RATE - AFR AMER: 102 ML/MIN (ref 60–?)
GLOBULIN: 3.7 G/DL (ref 2.2–4.2)
GLUCOSE: 89 MG/DL (ref 74–106)
HCT VFR BLD AUTO: 44.4 % (ref 40–54)
HEMOGLOBIN: 13.3 G/DL (ref 13–16.5)
HGB BLD-MCNC: 13.3 G/DL (ref 13–16.5)
MCV RBC: 96.9 FL (ref 80–94)
MEAN CORP HGB CONC: 30 G/DL (ref 32–36)
MEAN PLATELET VOL.: 9.8 FL (ref 6.2–12)
PLATELET # BLD: 241 K/MM3 (ref 150–450)
PLATELET COUNT: 241 K/MM3 (ref 150–450)
POTASSIUM: 4.3 MMOL/L (ref 3.5–5.1)
RBC # BLD AUTO: 4.58 M/MM3 (ref 4.6–6.2)
RBC DISTRIBUTION WIDTH CV: 12.5 % (ref 11.6–14.6)
RBC DISTRIBUTION WIDTH SD: 44.6 FL (ref 35.1–43.9)
TRIGLYCERIDES: 97 MG/DL
VITAMIN D,25 HYDROXY: 82.4 NG/ML
VLDLC SERPL-MCNC: 19 MG/DL (ref 5–40)
WBC # BLD AUTO: 10.2 K/MM3 (ref 4.4–11)
WHITE BLOOD COUNT: 10.2 K/MM3 (ref 4.4–11)

## 2021-02-10 PROCEDURE — 85027 COMPLETE CBC AUTOMATED: CPT

## 2021-02-10 PROCEDURE — 80061 LIPID PANEL: CPT

## 2021-02-10 PROCEDURE — 80053 COMPREHEN METABOLIC PANEL: CPT

## 2021-02-10 PROCEDURE — 36415 COLL VENOUS BLD VENIPUNCTURE: CPT

## 2021-02-10 PROCEDURE — 82306 VITAMIN D 25 HYDROXY: CPT

## 2021-05-04 ENCOUNTER — HOSPITAL ENCOUNTER (OUTPATIENT)
Age: 76
End: 2021-05-04
Payer: MEDICARE

## 2021-05-04 VITALS — BODY MASS INDEX: 37.5 KG/M2

## 2021-05-04 DIAGNOSIS — Z79.899: ICD-10-CM

## 2021-05-04 DIAGNOSIS — L40.0: Primary | ICD-10-CM

## 2021-05-04 LAB
ANION GAP: 1 (ref 5–15)
BUN SERPL-MCNC: 20 MG/DL (ref 7–18)
BUN/CREAT RATIO: 21.5 RATIO (ref 10–20)
CALCIUM SERPL-MCNC: 9.2 MG/DL (ref 8.5–10.1)
CARBON DIOXIDE: 40 MMOL/L (ref 21–32)
CHLORIDE: 99 MMOL/L (ref 98–107)
DEPRECATED RDW RBC: 45.6 FL (ref 35.1–43.9)
ERYTHROCYTE [DISTWIDTH] IN BLOOD: 12.9 % (ref 11.6–14.6)
EST GLOM FILT RATE - AFR AMER: 102 ML/MIN (ref 60–?)
GLUCOSE: 97 MG/DL (ref 74–106)
HCT VFR BLD AUTO: 43.9 % (ref 40–54)
HEMOGLOBIN: 12.8 G/DL (ref 13–16.5)
HGB BLD-MCNC: 12.8 G/DL (ref 13–16.5)
IMMATURE GRANULOCYTES COUNT: 0.02 X10^3/UL (ref 0–0)
MCV RBC: 95.9 FL (ref 80–94)
MEAN CORP HGB CONC: 29.2 G/DL (ref 32–36)
MEAN PLATELET VOL.: 10.1 FL (ref 6.2–12)
NRBC FLAGGED BY ANALYZER: 0 % (ref 0–5)
PLATELET # BLD: 249 K/MM3 (ref 150–450)
PLATELET COUNT: 249 K/MM3 (ref 150–450)
POTASSIUM: 3.8 MMOL/L (ref 3.5–5.1)
RBC # BLD AUTO: 4.58 M/MM3 (ref 4.6–6.2)
RBC DISTRIBUTION WIDTH CV: 12.9 % (ref 11.6–14.6)
RBC DISTRIBUTION WIDTH SD: 45.6 FL (ref 35.1–43.9)
WBC # BLD AUTO: 9.9 K/MM3 (ref 4.4–11)
WHITE BLOOD COUNT: 9.9 K/MM3 (ref 4.4–11)

## 2021-05-04 PROCEDURE — 86480 TB TEST CELL IMMUN MEASURE: CPT

## 2021-05-04 PROCEDURE — 36415 COLL VENOUS BLD VENIPUNCTURE: CPT

## 2021-05-04 PROCEDURE — 80048 BASIC METABOLIC PNL TOTAL CA: CPT

## 2021-05-04 PROCEDURE — 85025 COMPLETE CBC W/AUTO DIFF WBC: CPT

## 2021-05-07 LAB
M TB TUBERC IFN-G BLD QL: 0 IU/ML
QNTFERON TB MITOGEN VALUE: > 10 IU/ML
QNTFERON TB NIL VALUE: 0 IU/ML
QNTFERON TB2+ AG VALUE: 0 IU/ML

## 2021-07-13 ENCOUNTER — HOSPITAL ENCOUNTER (OUTPATIENT)
Age: 76
Discharge: HOME | End: 2021-07-13
Payer: MEDICARE

## 2021-07-13 VITALS — BODY MASS INDEX: 37.5 KG/M2

## 2021-07-13 DIAGNOSIS — J44.9: Primary | ICD-10-CM

## 2021-07-13 PROCEDURE — 87205 SMEAR GRAM STAIN: CPT

## 2021-07-13 PROCEDURE — 87070 CULTURE OTHR SPECIMN AEROBIC: CPT

## 2021-08-10 ENCOUNTER — HOSPITAL ENCOUNTER (INPATIENT)
Dept: HOSPITAL 100 - ED | Age: 76
LOS: 2 days | Discharge: HOME | DRG: 190 | End: 2021-08-12
Payer: MEDICARE

## 2021-08-10 VITALS
DIASTOLIC BLOOD PRESSURE: 72 MMHG | RESPIRATION RATE: 20 BRPM | SYSTOLIC BLOOD PRESSURE: 136 MMHG | HEART RATE: 88 BPM | OXYGEN SATURATION: 94 %

## 2021-08-10 VITALS
OXYGEN SATURATION: 96 % | HEART RATE: 95 BPM | RESPIRATION RATE: 23 BRPM | SYSTOLIC BLOOD PRESSURE: 116 MMHG | DIASTOLIC BLOOD PRESSURE: 68 MMHG

## 2021-08-10 VITALS
RESPIRATION RATE: 18 BRPM | SYSTOLIC BLOOD PRESSURE: 131 MMHG | DIASTOLIC BLOOD PRESSURE: 81 MMHG | HEART RATE: 90 BPM | OXYGEN SATURATION: 98 %

## 2021-08-10 VITALS — RESPIRATION RATE: 18 BRPM | OXYGEN SATURATION: 94 % | HEART RATE: 91 BPM

## 2021-08-10 VITALS — HEART RATE: 96 BPM

## 2021-08-10 VITALS — BODY MASS INDEX: 40 KG/M2 | BODY MASS INDEX: 37.5 KG/M2 | BODY MASS INDEX: 39.3 KG/M2

## 2021-08-10 VITALS
OXYGEN SATURATION: 98 % | HEART RATE: 88 BPM | DIASTOLIC BLOOD PRESSURE: 104 MMHG | TEMPERATURE: 99.14 F | SYSTOLIC BLOOD PRESSURE: 173 MMHG | RESPIRATION RATE: 19 BRPM

## 2021-08-10 VITALS
DIASTOLIC BLOOD PRESSURE: 130 MMHG | RESPIRATION RATE: 20 BRPM | OXYGEN SATURATION: 97 % | HEART RATE: 100 BPM | SYSTOLIC BLOOD PRESSURE: 151 MMHG | TEMPERATURE: 99.2 F

## 2021-08-10 VITALS
TEMPERATURE: 97.88 F | HEART RATE: 87 BPM | OXYGEN SATURATION: 94 % | SYSTOLIC BLOOD PRESSURE: 132 MMHG | DIASTOLIC BLOOD PRESSURE: 76 MMHG | RESPIRATION RATE: 17 BRPM

## 2021-08-10 VITALS — RESPIRATION RATE: 12 BRPM | HEART RATE: 96 BPM | OXYGEN SATURATION: 95 %

## 2021-08-10 VITALS — OXYGEN SATURATION: 93 %

## 2021-08-10 VITALS — HEART RATE: 98 BPM

## 2021-08-10 VITALS
HEART RATE: 82 BPM | OXYGEN SATURATION: 93 % | RESPIRATION RATE: 19 BRPM | TEMPERATURE: 98.42 F | DIASTOLIC BLOOD PRESSURE: 80 MMHG | SYSTOLIC BLOOD PRESSURE: 148 MMHG

## 2021-08-10 VITALS
OXYGEN SATURATION: 96 % | TEMPERATURE: 96 F | HEART RATE: 94 BPM | RESPIRATION RATE: 20 BRPM | SYSTOLIC BLOOD PRESSURE: 145 MMHG | DIASTOLIC BLOOD PRESSURE: 67 MMHG

## 2021-08-10 VITALS — RESPIRATION RATE: 12 BRPM | OXYGEN SATURATION: 94 % | HEART RATE: 88 BPM

## 2021-08-10 VITALS — HEART RATE: 93 BPM | RESPIRATION RATE: 12 BRPM | OXYGEN SATURATION: 95 %

## 2021-08-10 VITALS
TEMPERATURE: 98.78 F | OXYGEN SATURATION: 98 % | HEART RATE: 101 BPM | RESPIRATION RATE: 18 BRPM | SYSTOLIC BLOOD PRESSURE: 148 MMHG | DIASTOLIC BLOOD PRESSURE: 89 MMHG

## 2021-08-10 VITALS
OXYGEN SATURATION: 95 % | HEART RATE: 93 BPM | TEMPERATURE: 96.98 F | DIASTOLIC BLOOD PRESSURE: 82 MMHG | SYSTOLIC BLOOD PRESSURE: 133 MMHG | RESPIRATION RATE: 18 BRPM

## 2021-08-10 VITALS — RESPIRATION RATE: 25 BRPM | OXYGEN SATURATION: 94 % | HEART RATE: 85 BPM

## 2021-08-10 VITALS — HEART RATE: 90 BPM

## 2021-08-10 VITALS
RESPIRATION RATE: 16 BRPM | SYSTOLIC BLOOD PRESSURE: 130 MMHG | HEART RATE: 91 BPM | OXYGEN SATURATION: 97 % | DIASTOLIC BLOOD PRESSURE: 71 MMHG

## 2021-08-10 VITALS — OXYGEN SATURATION: 95 % | RESPIRATION RATE: 28 BRPM | HEART RATE: 100 BPM

## 2021-08-10 VITALS
SYSTOLIC BLOOD PRESSURE: 131 MMHG | DIASTOLIC BLOOD PRESSURE: 65 MMHG | OXYGEN SATURATION: 95 % | HEART RATE: 96 BPM | TEMPERATURE: 98.78 F | RESPIRATION RATE: 18 BRPM

## 2021-08-10 DIAGNOSIS — F03.90: ICD-10-CM

## 2021-08-10 DIAGNOSIS — Z85.038: ICD-10-CM

## 2021-08-10 DIAGNOSIS — Z79.82: ICD-10-CM

## 2021-08-10 DIAGNOSIS — K21.9: ICD-10-CM
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
## 2021-08-10 DIAGNOSIS — J96.21: ICD-10-CM

## 2021-08-10 DIAGNOSIS — Z79.899: ICD-10-CM

## 2021-08-10 DIAGNOSIS — J96.22: ICD-10-CM

## 2021-08-10 DIAGNOSIS — M19.90: ICD-10-CM

## 2021-08-10 DIAGNOSIS — F32.9: ICD-10-CM

## 2021-08-10 DIAGNOSIS — G47.33: ICD-10-CM
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
## 2021-08-10 DIAGNOSIS — E78.5: ICD-10-CM

## 2021-08-10 DIAGNOSIS — Z87.891: ICD-10-CM
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
## 2021-08-10 DIAGNOSIS — I10: ICD-10-CM

## 2021-08-10 DIAGNOSIS — F41.9: ICD-10-CM

## 2021-08-10 DIAGNOSIS — J44.1: Primary | ICD-10-CM

## 2021-08-10 DIAGNOSIS — Z99.81: ICD-10-CM

## 2021-08-10 DIAGNOSIS — E66.9: ICD-10-CM

## 2021-08-10 DIAGNOSIS — Z90.49: ICD-10-CM

## 2021-08-10 LAB
ANION GAP: (no result) (ref 5–15)
BASE EXCESS BLDV CALC-SCNC: 21 MMOL/L
BASE EXCESS BLDV CALC-SCNC: 22 MMOL/L
BUN SERPL-MCNC: 14 MG/DL (ref 7–18)
BUN/CREAT RATIO: 17.5 RATIO (ref 10–20)
CALCIUM SERPL-MCNC: 9 MG/DL (ref 8.5–10.1)
CARBON DIOXIDE: > 45 MMOL/L (ref 21–32)
CHLORIDE: 96 MMOL/L (ref 98–107)
DEPRECATED RDW RBC: 50.2 FL (ref 35.1–43.9)
ERYTHROCYTE [DISTWIDTH] IN BLOOD: 13 % (ref 11.6–14.6)
EST GLOM FILT RATE - AFR AMER: 121 ML/MIN (ref 60–?)
ESTIMATED CREATININE CLEARANCE: 78.38 ML/MIN
GLUCOSE: 117 MG/DL (ref 74–106)
HCT VFR BLD AUTO: 43.7 % (ref 40–54)
HEMOGLOBIN: 12.3 G/DL (ref 13–16.5)
HGB BLD-MCNC: 12.3 G/DL (ref 13–16.5)
IMMATURE GRANULOCYTES COUNT: 0.06 X10^3/UL (ref 0–0)
LPM: 4 /MIN
LPM: 5.5 /MIN
MCV RBC: 104.3 FL (ref 80–94)
MEAN CORP HGB CONC: 28.1 G/DL (ref 32–36)
MEAN PLATELET VOL.: 9.9 FL (ref 6.2–12)
NRBC FLAGGED BY ANALYZER: 0 % (ref 0–5)
PLATELET # BLD: 216 K/MM3 (ref 150–450)
PLATELET COUNT: 216 K/MM3 (ref 150–450)
PO2 BLDA: 72 MMHG (ref 75–100)
PO2 BLDA: 94 MMHG (ref 75–100)
POTASSIUM: 4.6 MMOL/L (ref 3.5–5.1)
RBC # BLD AUTO: 4.19 M/MM3 (ref 4.6–6.2)
RBC DISTRIBUTION WIDTH CV: 13 % (ref 11.6–14.6)
RBC DISTRIBUTION WIDTH SD: 50.2 FL (ref 35.1–43.9)
SO2: 91 % (ref 95–99)
SO2: 96 % (ref 95–99)
TROPONIN-I HS: 12 PG/ML (ref 3–78.5)
WBC # BLD AUTO: 10.3 K/MM3 (ref 4.4–11)
WHITE BLOOD COUNT: 10.3 K/MM3 (ref 4.4–11)

## 2021-08-10 PROCEDURE — 84484 ASSAY OF TROPONIN QUANT: CPT

## 2021-08-10 PROCEDURE — 36600 WITHDRAWAL OF ARTERIAL BLOOD: CPT

## 2021-08-10 PROCEDURE — 94003 VENT MGMT INPAT SUBQ DAY: CPT

## 2021-08-10 PROCEDURE — 94002 VENT MGMT INPAT INIT DAY: CPT

## 2021-08-10 PROCEDURE — A4216 STERILE WATER/SALINE, 10 ML: HCPCS

## 2021-08-10 PROCEDURE — 82803 BLOOD GASES ANY COMBINATION: CPT

## 2021-08-10 PROCEDURE — 71045 X-RAY EXAM CHEST 1 VIEW: CPT

## 2021-08-10 PROCEDURE — 94640 AIRWAY INHALATION TREATMENT: CPT

## 2021-08-10 PROCEDURE — 99285 EMERGENCY DEPT VISIT HI MDM: CPT

## 2021-08-10 PROCEDURE — 97162 PT EVAL MOD COMPLEX 30 MIN: CPT

## 2021-08-10 PROCEDURE — 72125 CT NECK SPINE W/O DYE: CPT

## 2021-08-10 PROCEDURE — 87426 SARSCOV CORONAVIRUS AG IA: CPT

## 2021-08-10 PROCEDURE — 82962 GLUCOSE BLOOD TEST: CPT

## 2021-08-10 PROCEDURE — 70450 CT HEAD/BRAIN W/O DYE: CPT

## 2021-08-10 PROCEDURE — 85025 COMPLETE CBC W/AUTO DIFF WBC: CPT

## 2021-08-10 PROCEDURE — 93005 ELECTROCARDIOGRAM TRACING: CPT

## 2021-08-10 PROCEDURE — 80048 BASIC METABOLIC PNL TOTAL CA: CPT

## 2021-08-10 RX ADMIN — SODIUM CHLORIDE, PRESERVATIVE FREE 0 ML: 5 INJECTION INTRAVENOUS at 20:59

## 2021-08-10 RX ADMIN — SODIUM CHLORIDE, PRESERVATIVE FREE 0 ML: 5 INJECTION INTRAVENOUS at 08:38

## 2021-08-10 RX ADMIN — LEVOFLOXACIN 100 MG: 500 INJECTION, SOLUTION INTRAVENOUS at 10:42

## 2021-08-10 RX ADMIN — SODIUM CHLORIDE, PRESERVATIVE FREE 0 ML: 5 INJECTION INTRAVENOUS at 13:42

## 2021-08-11 VITALS
TEMPERATURE: 96.44 F | HEART RATE: 93 BPM | DIASTOLIC BLOOD PRESSURE: 80 MMHG | RESPIRATION RATE: 18 BRPM | OXYGEN SATURATION: 97 % | SYSTOLIC BLOOD PRESSURE: 141 MMHG

## 2021-08-11 VITALS
HEART RATE: 99 BPM | TEMPERATURE: 97.6 F | SYSTOLIC BLOOD PRESSURE: 168 MMHG | RESPIRATION RATE: 18 BRPM | DIASTOLIC BLOOD PRESSURE: 91 MMHG | OXYGEN SATURATION: 94 %

## 2021-08-11 VITALS
SYSTOLIC BLOOD PRESSURE: 135 MMHG | DIASTOLIC BLOOD PRESSURE: 77 MMHG | TEMPERATURE: 97.88 F | HEART RATE: 94 BPM | RESPIRATION RATE: 18 BRPM | OXYGEN SATURATION: 93 %

## 2021-08-11 VITALS
TEMPERATURE: 97.52 F | RESPIRATION RATE: 24 BRPM | DIASTOLIC BLOOD PRESSURE: 52 MMHG | SYSTOLIC BLOOD PRESSURE: 154 MMHG | HEART RATE: 94 BPM | OXYGEN SATURATION: 90 %

## 2021-08-11 VITALS — HEART RATE: 97 BPM | RESPIRATION RATE: 26 BRPM

## 2021-08-11 VITALS — RESPIRATION RATE: 24 BRPM | HEART RATE: 99 BPM

## 2021-08-11 VITALS — HEART RATE: 98 BPM

## 2021-08-11 VITALS — HEART RATE: 93 BPM

## 2021-08-11 VITALS — HEART RATE: 90 BPM | OXYGEN SATURATION: 94 % | RESPIRATION RATE: 24 BRPM

## 2021-08-11 VITALS
TEMPERATURE: 97.88 F | RESPIRATION RATE: 20 BRPM | HEART RATE: 101 BPM | DIASTOLIC BLOOD PRESSURE: 73 MMHG | OXYGEN SATURATION: 97 % | SYSTOLIC BLOOD PRESSURE: 155 MMHG

## 2021-08-11 VITALS
DIASTOLIC BLOOD PRESSURE: 72 MMHG | TEMPERATURE: 98.6 F | OXYGEN SATURATION: 95 % | RESPIRATION RATE: 18 BRPM | HEART RATE: 108 BPM | SYSTOLIC BLOOD PRESSURE: 151 MMHG

## 2021-08-11 VITALS — HEART RATE: 99 BPM | RESPIRATION RATE: 18 BRPM

## 2021-08-11 VITALS — RESPIRATION RATE: 12 BRPM

## 2021-08-11 VITALS — HEART RATE: 99 BPM

## 2021-08-11 VITALS — HEART RATE: 92 BPM

## 2021-08-11 VITALS — HEART RATE: 95 BPM

## 2021-08-11 VITALS — HEART RATE: 97 BPM

## 2021-08-11 LAB
ANION GAP: 5 (ref 5–15)
BUN SERPL-MCNC: 20 MG/DL (ref 7–18)
BUN/CREAT RATIO: 23.9 RATIO (ref 10–20)
CALCIUM SERPL-MCNC: 9 MG/DL (ref 8.5–10.1)
CARBON DIOXIDE: 41 MMOL/L (ref 21–32)
CHLORIDE: 94 MMOL/L (ref 98–107)
DEPRECATED RDW RBC: 45.6 FL (ref 35.1–43.9)
DIFFERENTIAL INDICATED: (no result)
ERYTHROCYTE [DISTWIDTH] IN BLOOD: 12.8 % (ref 11.6–14.6)
EST GLOM FILT RATE - AFR AMER: 115 ML/MIN (ref 60–?)
ESTIMATED CREATININE CLEARANCE: 74.64 ML/MIN
GLUCOSE: 141 MG/DL (ref 74–106)
HCT VFR BLD AUTO: 41.3 % (ref 40–54)
HEMOGLOBIN: 12.2 G/DL (ref 13–16.5)
HGB BLD-MCNC: 12.2 G/DL (ref 13–16.5)
IMMATURE GRANULOCYTES COUNT: 0.05 X10^3/UL (ref 0–0)
MCV RBC: 97.6 FL (ref 80–94)
MEAN CORP HGB CONC: 29.5 G/DL (ref 32–36)
MEAN PLATELET VOL.: 10.2 FL (ref 6.2–12)
NRBC FLAGGED BY ANALYZER: 0 % (ref 0–5)
PLATELET # BLD: 238 K/MM3 (ref 150–450)
PLATELET COUNT: 238 K/MM3 (ref 150–450)
POTASSIUM: 3.5 MMOL/L (ref 3.5–5.1)
RBC # BLD AUTO: 4.23 M/MM3 (ref 4.6–6.2)
RBC DISTRIBUTION WIDTH CV: 12.8 % (ref 11.6–14.6)
RBC DISTRIBUTION WIDTH SD: 45.6 FL (ref 35.1–43.9)
SCAN SMEAR PER REVIEW CRITERIA: (no result)
WBC # BLD AUTO: 9.4 K/MM3 (ref 4.4–11)
WHITE BLOOD COUNT: 9.4 K/MM3 (ref 4.4–11)

## 2021-08-11 RX ADMIN — LEVOFLOXACIN 100 MG: 500 INJECTION, SOLUTION INTRAVENOUS at 08:45

## 2021-08-11 RX ADMIN — SODIUM CHLORIDE, PRESERVATIVE FREE 0 ML: 5 INJECTION INTRAVENOUS at 05:26

## 2021-08-11 RX ADMIN — SODIUM CHLORIDE, PRESERVATIVE FREE 0 ML: 5 INJECTION INTRAVENOUS at 21:20

## 2021-08-11 RX ADMIN — ALBUTEROL SULFATE 2.5 MG: 2.5 SOLUTION RESPIRATORY (INHALATION) at 02:07

## 2021-08-12 VITALS
TEMPERATURE: 98.42 F | RESPIRATION RATE: 20 BRPM | HEART RATE: 101 BPM | SYSTOLIC BLOOD PRESSURE: 128 MMHG | OXYGEN SATURATION: 94 % | DIASTOLIC BLOOD PRESSURE: 87 MMHG

## 2021-08-12 VITALS
OXYGEN SATURATION: 94 % | RESPIRATION RATE: 20 BRPM | TEMPERATURE: 98.5 F | SYSTOLIC BLOOD PRESSURE: 128 MMHG | HEART RATE: 101 BPM | DIASTOLIC BLOOD PRESSURE: 87 MMHG

## 2021-08-12 VITALS
HEART RATE: 95 BPM | SYSTOLIC BLOOD PRESSURE: 142 MMHG | TEMPERATURE: 98.3 F | OXYGEN SATURATION: 94 % | DIASTOLIC BLOOD PRESSURE: 67 MMHG | RESPIRATION RATE: 18 BRPM

## 2021-08-12 VITALS — HEART RATE: 101 BPM | RESPIRATION RATE: 20 BRPM

## 2021-08-12 VITALS — HEART RATE: 91 BPM

## 2021-08-12 VITALS — OXYGEN SATURATION: 88 %

## 2021-08-12 VITALS — HEART RATE: 104 BPM | RESPIRATION RATE: 20 BRPM

## 2021-08-12 VITALS — HEART RATE: 99 BPM

## 2021-08-12 VITALS — HEART RATE: 90 BPM

## 2021-08-12 VITALS — HEART RATE: 96 BPM

## 2021-08-12 RX ADMIN — SODIUM CHLORIDE, PRESERVATIVE FREE 0 ML: 5 INJECTION INTRAVENOUS at 14:38

## 2021-08-12 RX ADMIN — SODIUM CHLORIDE, PRESERVATIVE FREE 0 ML: 5 INJECTION INTRAVENOUS at 09:03

## 2021-08-18 ENCOUNTER — HOSPITAL ENCOUNTER (INPATIENT)
Dept: HOSPITAL 100 - ED | Age: 76
LOS: 2 days | Discharge: NURSING, REHAB OR LONG-TERM CARE | DRG: 190 | End: 2021-08-20
Payer: MEDICARE

## 2021-08-18 VITALS — OXYGEN SATURATION: 95 %

## 2021-08-18 VITALS
OXYGEN SATURATION: 94 % | DIASTOLIC BLOOD PRESSURE: 78 MMHG | RESPIRATION RATE: 20 BRPM | SYSTOLIC BLOOD PRESSURE: 149 MMHG | HEART RATE: 93 BPM | TEMPERATURE: 97.7 F

## 2021-08-18 VITALS
RESPIRATION RATE: 17 BRPM | TEMPERATURE: 98.42 F | DIASTOLIC BLOOD PRESSURE: 95 MMHG | OXYGEN SATURATION: 95 % | HEART RATE: 84 BPM | SYSTOLIC BLOOD PRESSURE: 155 MMHG

## 2021-08-18 VITALS
TEMPERATURE: 98.24 F | SYSTOLIC BLOOD PRESSURE: 187 MMHG | RESPIRATION RATE: 19 BRPM | DIASTOLIC BLOOD PRESSURE: 95 MMHG | OXYGEN SATURATION: 94 % | HEART RATE: 89 BPM

## 2021-08-18 VITALS
TEMPERATURE: 97.88 F | RESPIRATION RATE: 22 BRPM | SYSTOLIC BLOOD PRESSURE: 145 MMHG | OXYGEN SATURATION: 92 % | DIASTOLIC BLOOD PRESSURE: 102 MMHG | HEART RATE: 84 BPM

## 2021-08-18 VITALS
TEMPERATURE: 97.88 F | RESPIRATION RATE: 22 BRPM | DIASTOLIC BLOOD PRESSURE: 102 MMHG | SYSTOLIC BLOOD PRESSURE: 145 MMHG | HEART RATE: 84 BPM | OXYGEN SATURATION: 92 %

## 2021-08-18 VITALS — OXYGEN SATURATION: 98 % | RESPIRATION RATE: 17 BRPM | HEART RATE: 94 BPM

## 2021-08-18 VITALS
TEMPERATURE: 98.5 F | DIASTOLIC BLOOD PRESSURE: 96 MMHG | HEART RATE: 84 BPM | OXYGEN SATURATION: 95 % | SYSTOLIC BLOOD PRESSURE: 162 MMHG | RESPIRATION RATE: 20 BRPM

## 2021-08-18 VITALS
OXYGEN SATURATION: 97 % | DIASTOLIC BLOOD PRESSURE: 89 MMHG | SYSTOLIC BLOOD PRESSURE: 156 MMHG | TEMPERATURE: 98.7 F | RESPIRATION RATE: 24 BRPM | HEART RATE: 89 BPM

## 2021-08-18 VITALS — RESPIRATION RATE: 12 BRPM | OXYGEN SATURATION: 96 % | HEART RATE: 93 BPM

## 2021-08-18 VITALS — HEART RATE: 89 BPM | RESPIRATION RATE: 16 BRPM | OXYGEN SATURATION: 98 %

## 2021-08-18 VITALS — HEART RATE: 89 BPM

## 2021-08-18 VITALS — HEART RATE: 102 BPM

## 2021-08-18 VITALS — BODY MASS INDEX: 41.5 KG/M2

## 2021-08-18 VITALS — HEART RATE: 96 BPM

## 2021-08-18 DIAGNOSIS — K21.9: ICD-10-CM

## 2021-08-18 DIAGNOSIS — F32.9: ICD-10-CM

## 2021-08-18 DIAGNOSIS — R53.81: ICD-10-CM

## 2021-08-18 DIAGNOSIS — J44.1: Primary | ICD-10-CM

## 2021-08-18 DIAGNOSIS — Z85.038: ICD-10-CM

## 2021-08-18 DIAGNOSIS — J96.22: ICD-10-CM

## 2021-08-18 DIAGNOSIS — Z87.891: ICD-10-CM

## 2021-08-18 DIAGNOSIS — Z79.899: ICD-10-CM

## 2021-08-18 DIAGNOSIS — E78.5: ICD-10-CM

## 2021-08-18 DIAGNOSIS — J96.21: ICD-10-CM

## 2021-08-18 DIAGNOSIS — F41.9: ICD-10-CM

## 2021-08-18 DIAGNOSIS — F03.90: ICD-10-CM

## 2021-08-18 DIAGNOSIS — Z79.82: ICD-10-CM

## 2021-08-18 DIAGNOSIS — R62.7: ICD-10-CM

## 2021-08-18 DIAGNOSIS — M19.90: ICD-10-CM

## 2021-08-18 DIAGNOSIS — E66.01: ICD-10-CM

## 2021-08-18 DIAGNOSIS — I10: ICD-10-CM

## 2021-08-18 LAB
ANION GAP: (no result) (ref 5–15)
BASE EXCESS BLDV CALC-SCNC: 19 MMOL/L
BUN SERPL-MCNC: 15 MG/DL (ref 7–18)
BUN/CREAT RATIO: 20.3 RATIO (ref 10–20)
CALCIUM SERPL-MCNC: 9.2 MG/DL (ref 8.5–10.1)
CARBON DIOXIDE: > 45 MMOL/L (ref 21–32)
CHLORIDE: 97 MMOL/L (ref 98–107)
DEPRECATED RDW RBC: 48.2 FL (ref 35.1–43.9)
ERYTHROCYTE [DISTWIDTH] IN BLOOD: 13.1 % (ref 11.6–14.6)
EST GLOM FILT RATE - AFR AMER: 133 ML/MIN (ref 60–?)
ESTIMATED CREATININE CLEARANCE: 60.59 ML/MIN
GLUCOSE: 95 MG/DL (ref 74–106)
HCT VFR BLD AUTO: 44.2 % (ref 40–54)
HEMOGLOBIN: 12.5 G/DL (ref 13–16.5)
HGB BLD-MCNC: 12.5 G/DL (ref 13–16.5)
IMMATURE GRANULOCYTES COUNT: 0.06 X10^3/UL (ref 0–0)
MCV RBC: 99.5 FL (ref 80–94)
MEAN CORP HGB CONC: 28.3 G/DL (ref 32–36)
MEAN PLATELET VOL.: 10 FL (ref 6.2–12)
NRBC FLAGGED BY ANALYZER: 0 % (ref 0–5)
PLATELET # BLD: 254 K/MM3 (ref 150–450)
PLATELET COUNT: 254 K/MM3 (ref 150–450)
PO2 BLDA: 92 MMHG (ref 75–100)
POTASSIUM: 4.1 MMOL/L (ref 3.5–5.1)
RBC # BLD AUTO: 4.44 M/MM3 (ref 4.6–6.2)
RBC DISTRIBUTION WIDTH CV: 13.1 % (ref 11.6–14.6)
RBC DISTRIBUTION WIDTH SD: 48.2 FL (ref 35.1–43.9)
SO2: 96 % (ref 95–99)
TROPONIN-I HS: 10 PG/ML (ref 3–78)
TROPONIN-I HS: 10 PG/ML (ref 3–78)
TROPONIN-I HS: 12 PG/ML (ref 3–78)
WBC # BLD AUTO: 10.2 K/MM3 (ref 4.4–11)
WHITE BLOOD COUNT: 10.2 K/MM3 (ref 4.4–11)

## 2021-08-18 PROCEDURE — 97535 SELF CARE MNGMENT TRAINING: CPT

## 2021-08-18 PROCEDURE — 93005 ELECTROCARDIOGRAM TRACING: CPT

## 2021-08-18 PROCEDURE — 87426 SARSCOV CORONAVIRUS AG IA: CPT

## 2021-08-18 PROCEDURE — 84484 ASSAY OF TROPONIN QUANT: CPT

## 2021-08-18 PROCEDURE — 94003 VENT MGMT INPAT SUBQ DAY: CPT

## 2021-08-18 PROCEDURE — 36415 COLL VENOUS BLD VENIPUNCTURE: CPT

## 2021-08-18 PROCEDURE — 85025 COMPLETE CBC W/AUTO DIFF WBC: CPT

## 2021-08-18 PROCEDURE — 71045 X-RAY EXAM CHEST 1 VIEW: CPT

## 2021-08-18 PROCEDURE — 83605 ASSAY OF LACTIC ACID: CPT

## 2021-08-18 PROCEDURE — 94640 AIRWAY INHALATION TREATMENT: CPT

## 2021-08-18 PROCEDURE — 36600 WITHDRAWAL OF ARTERIAL BLOOD: CPT

## 2021-08-18 PROCEDURE — 94660 CPAP INITIATION&MGMT: CPT

## 2021-08-18 PROCEDURE — A4216 STERILE WATER/SALINE, 10 ML: HCPCS

## 2021-08-18 PROCEDURE — 97162 PT EVAL MOD COMPLEX 30 MIN: CPT

## 2021-08-18 PROCEDURE — 87040 BLOOD CULTURE FOR BACTERIA: CPT

## 2021-08-18 PROCEDURE — 94002 VENT MGMT INPAT INIT DAY: CPT

## 2021-08-18 PROCEDURE — 82803 BLOOD GASES ANY COMBINATION: CPT

## 2021-08-18 PROCEDURE — 80048 BASIC METABOLIC PNL TOTAL CA: CPT

## 2021-08-18 PROCEDURE — 97166 OT EVAL MOD COMPLEX 45 MIN: CPT

## 2021-08-18 PROCEDURE — 97802 MEDICAL NUTRITION INDIV IN: CPT

## 2021-08-18 PROCEDURE — 99285 EMERGENCY DEPT VISIT HI MDM: CPT

## 2021-08-18 RX ADMIN — BUDESONIDE 0.5 MG: 0.5 SUSPENSION RESPIRATORY (INHALATION) at 20:02

## 2021-08-18 RX ADMIN — SODIUM CHLORIDE 150 ML: 9 INJECTION, SOLUTION INTRAVENOUS at 12:00

## 2021-08-18 RX ADMIN — ROFLUMILAST 125 MCG: 250 TABLET ORAL at 21:50

## 2021-08-18 RX ADMIN — ALBUTEROL SULFATE 2.5 MG: 2.5 SOLUTION RESPIRATORY (INHALATION) at 12:14

## 2021-08-18 RX ADMIN — SODIUM CHLORIDE, PRESERVATIVE FREE 0 ML: 5 INJECTION INTRAVENOUS at 20:56

## 2021-08-19 VITALS — RESPIRATION RATE: 20 BRPM | OXYGEN SATURATION: 95 % | HEART RATE: 91 BPM

## 2021-08-19 VITALS — HEART RATE: 92 BPM | RESPIRATION RATE: 20 BRPM

## 2021-08-19 VITALS — HEART RATE: 88 BPM | OXYGEN SATURATION: 94 % | RESPIRATION RATE: 12 BRPM

## 2021-08-19 VITALS
TEMPERATURE: 98.24 F | DIASTOLIC BLOOD PRESSURE: 67 MMHG | RESPIRATION RATE: 18 BRPM | SYSTOLIC BLOOD PRESSURE: 152 MMHG | OXYGEN SATURATION: 93 % | HEART RATE: 100 BPM

## 2021-08-19 VITALS
RESPIRATION RATE: 14 BRPM | HEART RATE: 100 BPM | DIASTOLIC BLOOD PRESSURE: 60 MMHG | SYSTOLIC BLOOD PRESSURE: 126 MMHG | TEMPERATURE: 96.98 F | OXYGEN SATURATION: 94 %

## 2021-08-19 VITALS
DIASTOLIC BLOOD PRESSURE: 72 MMHG | HEART RATE: 92 BPM | SYSTOLIC BLOOD PRESSURE: 133 MMHG | RESPIRATION RATE: 18 BRPM | OXYGEN SATURATION: 94 % | TEMPERATURE: 98.24 F

## 2021-08-19 VITALS
DIASTOLIC BLOOD PRESSURE: 75 MMHG | TEMPERATURE: 98.06 F | OXYGEN SATURATION: 94 % | SYSTOLIC BLOOD PRESSURE: 148 MMHG | RESPIRATION RATE: 20 BRPM | HEART RATE: 87 BPM

## 2021-08-19 VITALS
RESPIRATION RATE: 18 BRPM | HEART RATE: 96 BPM | SYSTOLIC BLOOD PRESSURE: 147 MMHG | OXYGEN SATURATION: 95 % | DIASTOLIC BLOOD PRESSURE: 68 MMHG | TEMPERATURE: 98.06 F

## 2021-08-19 VITALS — RESPIRATION RATE: 17 BRPM | HEART RATE: 88 BPM

## 2021-08-19 VITALS — HEART RATE: 94 BPM | RESPIRATION RATE: 12 BRPM | OXYGEN SATURATION: 94 %

## 2021-08-19 VITALS
SYSTOLIC BLOOD PRESSURE: 157 MMHG | RESPIRATION RATE: 18 BRPM | OXYGEN SATURATION: 90 % | TEMPERATURE: 97.7 F | HEART RATE: 96 BPM | DIASTOLIC BLOOD PRESSURE: 77 MMHG

## 2021-08-19 VITALS — RESPIRATION RATE: 18 BRPM | OXYGEN SATURATION: 95 % | HEART RATE: 91 BPM

## 2021-08-19 VITALS — HEART RATE: 85 BPM

## 2021-08-19 VITALS — HEART RATE: 86 BPM

## 2021-08-19 VITALS — HEART RATE: 102 BPM

## 2021-08-19 VITALS — HEART RATE: 100 BPM

## 2021-08-19 LAB
ANION GAP: 0 (ref 5–15)
BUN SERPL-MCNC: 14 MG/DL (ref 7–18)
BUN/CREAT RATIO: 20.4 RATIO (ref 10–20)
CALCIUM SERPL-MCNC: 8.6 MG/DL (ref 8.5–10.1)
CARBON DIOXIDE: 42 MMOL/L (ref 21–32)
CHLORIDE: 97 MMOL/L (ref 98–107)
DEPRECATED RDW RBC: 46.5 FL (ref 35.1–43.9)
ERYTHROCYTE [DISTWIDTH] IN BLOOD: 13.1 % (ref 11.6–14.6)
EST GLOM FILT RATE - AFR AMER: 145 ML/MIN (ref 60–?)
ESTIMATED CREATININE CLEARANCE: 60.59 ML/MIN
GLUCOSE: 128 MG/DL (ref 74–106)
HCT VFR BLD AUTO: 41.2 % (ref 40–54)
HEMOGLOBIN: 12.3 G/DL (ref 13–16.5)
HGB BLD-MCNC: 12.3 G/DL (ref 13–16.5)
IMMATURE GRANULOCYTES COUNT: 0.04 X10^3/UL (ref 0–0)
MCV RBC: 96.7 FL (ref 80–94)
MEAN CORP HGB CONC: 29.9 G/DL (ref 32–36)
MEAN PLATELET VOL.: 10.2 FL (ref 6.2–12)
NRBC FLAGGED BY ANALYZER: 0 % (ref 0–5)
PLATELET # BLD: 259 K/MM3 (ref 150–450)
PLATELET COUNT: 259 K/MM3 (ref 150–450)
POTASSIUM: 4.6 MMOL/L (ref 3.5–5.1)
RBC # BLD AUTO: 4.26 M/MM3 (ref 4.6–6.2)
RBC DISTRIBUTION WIDTH CV: 13.1 % (ref 11.6–14.6)
RBC DISTRIBUTION WIDTH SD: 46.5 FL (ref 35.1–43.9)
WBC # BLD AUTO: 11.1 K/MM3 (ref 4.4–11)
WHITE BLOOD COUNT: 11.1 K/MM3 (ref 4.4–11)

## 2021-08-19 RX ADMIN — SODIUM CHLORIDE, PRESERVATIVE FREE 0 ML: 5 INJECTION INTRAVENOUS at 14:32

## 2021-08-19 RX ADMIN — ROFLUMILAST 125 MCG: 250 TABLET ORAL at 21:29

## 2021-08-19 RX ADMIN — ROFLUMILAST 125 MCG: 250 TABLET ORAL at 09:17

## 2021-08-19 RX ADMIN — BUDESONIDE 0.5 MG: 0.5 SUSPENSION RESPIRATORY (INHALATION) at 06:40

## 2021-08-19 RX ADMIN — SODIUM CHLORIDE, PRESERVATIVE FREE 0 ML: 5 INJECTION INTRAVENOUS at 04:36

## 2021-08-19 RX ADMIN — BUDESONIDE 0.5 MG: 0.5 SUSPENSION RESPIRATORY (INHALATION) at 20:34

## 2021-08-19 RX ADMIN — SODIUM CHLORIDE, PRESERVATIVE FREE 0 ML: 5 INJECTION INTRAVENOUS at 21:29

## 2021-08-20 VITALS
SYSTOLIC BLOOD PRESSURE: 128 MMHG | RESPIRATION RATE: 12 BRPM | DIASTOLIC BLOOD PRESSURE: 66 MMHG | HEART RATE: 94 BPM | OXYGEN SATURATION: 96 % | TEMPERATURE: 98.2 F

## 2021-08-20 VITALS — HEART RATE: 98 BPM | RESPIRATION RATE: 20 BRPM

## 2021-08-20 VITALS — HEART RATE: 96 BPM | RESPIRATION RATE: 20 BRPM | OXYGEN SATURATION: 93 %

## 2021-08-20 VITALS
OXYGEN SATURATION: 94 % | SYSTOLIC BLOOD PRESSURE: 129 MMHG | DIASTOLIC BLOOD PRESSURE: 60 MMHG | HEART RATE: 99 BPM | TEMPERATURE: 98.3 F | RESPIRATION RATE: 16 BRPM

## 2021-08-20 VITALS — HEART RATE: 96 BPM

## 2021-08-20 VITALS
RESPIRATION RATE: 18 BRPM | TEMPERATURE: 98.1 F | DIASTOLIC BLOOD PRESSURE: 66 MMHG | SYSTOLIC BLOOD PRESSURE: 146 MMHG | HEART RATE: 92 BPM | OXYGEN SATURATION: 94 %

## 2021-08-20 VITALS
TEMPERATURE: 98.24 F | SYSTOLIC BLOOD PRESSURE: 140 MMHG | HEART RATE: 96 BPM | RESPIRATION RATE: 18 BRPM | OXYGEN SATURATION: 96 % | DIASTOLIC BLOOD PRESSURE: 85 MMHG

## 2021-08-20 VITALS — OXYGEN SATURATION: 92 % | HEART RATE: 94 BPM | RESPIRATION RATE: 12 BRPM

## 2021-08-20 VITALS — HEART RATE: 101 BPM

## 2021-08-20 VITALS — HEART RATE: 94 BPM

## 2021-08-20 LAB
ANION GAP: -1 (ref 5–15)
BUN SERPL-MCNC: 20 MG/DL (ref 7–18)
BUN/CREAT RATIO: 26.2 RATIO (ref 10–20)
CALCIUM SERPL-MCNC: 8.6 MG/DL (ref 8.5–10.1)
CARBON DIOXIDE: 43 MMOL/L (ref 21–32)
CHLORIDE: 96 MMOL/L (ref 98–107)
DEPRECATED RDW RBC: 46.8 FL (ref 35.1–43.9)
ERYTHROCYTE [DISTWIDTH] IN BLOOD: 13.2 % (ref 11.6–14.6)
EST GLOM FILT RATE - AFR AMER: 128 ML/MIN (ref 60–?)
ESTIMATED CREATININE CLEARANCE: 60.59 ML/MIN
GLUCOSE: 126 MG/DL (ref 74–106)
HCT VFR BLD AUTO: 41.2 % (ref 40–54)
HEMOGLOBIN: 12.2 G/DL (ref 13–16.5)
HGB BLD-MCNC: 12.2 G/DL (ref 13–16.5)
IMMATURE GRANULOCYTES COUNT: 0.15 X10^3/UL (ref 0–0)
MCV RBC: 96.9 FL (ref 80–94)
MEAN CORP HGB CONC: 29.6 G/DL (ref 32–36)
MEAN PLATELET VOL.: 10.1 FL (ref 6.2–12)
NRBC FLAGGED BY ANALYZER: 0 % (ref 0–5)
PLATELET # BLD: 256 K/MM3 (ref 150–450)
PLATELET COUNT: 256 K/MM3 (ref 150–450)
POTASSIUM: 4.5 MMOL/L (ref 3.5–5.1)
RBC # BLD AUTO: 4.25 M/MM3 (ref 4.6–6.2)
RBC DISTRIBUTION WIDTH CV: 13.2 % (ref 11.6–14.6)
RBC DISTRIBUTION WIDTH SD: 46.8 FL (ref 35.1–43.9)
WBC # BLD AUTO: 16.9 K/MM3 (ref 4.4–11)
WHITE BLOOD COUNT: 16.9 K/MM3 (ref 4.4–11)

## 2021-08-20 RX ADMIN — ROFLUMILAST 125 MCG: 250 TABLET ORAL at 10:13

## 2021-08-20 RX ADMIN — ERGOCALCIFEROL 1.25 MG: 1.25 CAPSULE ORAL at 10:14

## 2021-08-20 RX ADMIN — SODIUM CHLORIDE, PRESERVATIVE FREE 0 ML: 5 INJECTION INTRAVENOUS at 05:11

## 2021-08-20 RX ADMIN — SODIUM CHLORIDE, PRESERVATIVE FREE 0 ML: 5 INJECTION INTRAVENOUS at 13:37

## 2021-08-20 RX ADMIN — BUDESONIDE 0.5 MG: 0.5 SUSPENSION RESPIRATORY (INHALATION) at 07:10

## 2022-03-25 ENCOUNTER — HOSPITAL ENCOUNTER (OUTPATIENT)
Dept: HOSPITAL 100 - LAB | Age: 77
Discharge: HOME | End: 2022-03-25
Payer: MEDICARE

## 2022-03-25 DIAGNOSIS — F11.20: Primary | ICD-10-CM

## 2022-03-25 LAB
AMPHET UR-MCNC: NEGATIVE NG/ML
BARBITURATE URINE VISTA: NEGATIVE
BENZODIAZEPINE URINE VISTA: NEGATIVE
COCAINE URINE VISTA: NEGATIVE
DRUG CONFIRMATION TO FOLLOW?: (no result)
ECSTACY URINE VISTA: NEGATIVE
METHADONE URINE VISTA: NEGATIVE
PCP UR QL: NEGATIVE
PH UR: 6 [PH]
THC URINE VISTA: NEGATIVE

## 2022-03-25 PROCEDURE — 80307 DRUG TEST PRSMV CHEM ANLYZR: CPT

## 2022-08-12 ENCOUNTER — HOSPITAL ENCOUNTER (EMERGENCY)
Age: 77
LOS: 1 days | Discharge: ANOTHER HOSPITAL | End: 2022-08-13
Payer: MEDICARE

## 2022-08-12 VITALS
DIASTOLIC BLOOD PRESSURE: 104 MMHG | HEART RATE: 97 BPM | TEMPERATURE: 98.3 F | RESPIRATION RATE: 18 BRPM | OXYGEN SATURATION: 97 % | SYSTOLIC BLOOD PRESSURE: 177 MMHG

## 2022-08-12 VITALS — BODY MASS INDEX: 39.9 KG/M2

## 2022-08-12 DIAGNOSIS — F03.90: ICD-10-CM

## 2022-08-12 DIAGNOSIS — F41.9: ICD-10-CM

## 2022-08-12 DIAGNOSIS — E78.5: ICD-10-CM

## 2022-08-12 DIAGNOSIS — J44.9: ICD-10-CM

## 2022-08-12 DIAGNOSIS — Z79.82: ICD-10-CM

## 2022-08-12 DIAGNOSIS — Z87.19: ICD-10-CM

## 2022-08-12 DIAGNOSIS — I10: ICD-10-CM

## 2022-08-12 DIAGNOSIS — K43.6: ICD-10-CM

## 2022-08-12 DIAGNOSIS — K21.9: ICD-10-CM

## 2022-08-12 DIAGNOSIS — Z87.891: ICD-10-CM

## 2022-08-12 DIAGNOSIS — K56.609: Primary | ICD-10-CM

## 2022-08-12 DIAGNOSIS — Z85.038: ICD-10-CM

## 2022-08-12 DIAGNOSIS — F32.A: ICD-10-CM

## 2022-08-12 DIAGNOSIS — Z79.899: ICD-10-CM

## 2022-08-12 LAB
ALANINE AMINOTRANSFER ALT/SGPT: 15 U/L (ref 16–61)
ALBUMIN SERPL-MCNC: 3.4 G/DL (ref 3.2–5)
ALKALINE PHOSPHATASE: 85 U/L (ref 45–117)
ANION GAP: 4 (ref 5–15)
AST(SGOT): 11 U/L (ref 15–37)
BUN SERPL-MCNC: 20 MG/DL (ref 7–18)
BUN/CREAT RATIO: 20.3 RATIO (ref 10–20)
CALCIUM SERPL-MCNC: 9.2 MG/DL (ref 8.5–10.1)
CARBON DIOXIDE: 35 MMOL/L (ref 21–32)
CHLORIDE: 101 MMOL/L (ref 98–107)
EST GLOM FILT RATE - AFR AMER: 96 ML/MIN (ref 60–?)
ESTIMATED CREATININE CLEARANCE: 63.01 ML/MIN
GLOBULIN: 3.6 G/DL (ref 2.2–4.2)
GLUCOSE: 132 MG/DL (ref 74–106)
LIPASE: 70 U/L (ref 73–393)
POTASSIUM: 3.9 MMOL/L (ref 3.5–5.1)

## 2022-08-12 PROCEDURE — 83690 ASSAY OF LIPASE: CPT

## 2022-08-12 PROCEDURE — 87811 SARS-COV-2 COVID19 W/OPTIC: CPT

## 2022-08-12 PROCEDURE — 96375 TX/PRO/DX INJ NEW DRUG ADDON: CPT

## 2022-08-12 PROCEDURE — 96361 HYDRATE IV INFUSION ADD-ON: CPT

## 2022-08-12 PROCEDURE — 80053 COMPREHEN METABOLIC PANEL: CPT

## 2022-08-12 PROCEDURE — 85025 COMPLETE CBC W/AUTO DIFF WBC: CPT

## 2022-08-12 PROCEDURE — A4216 STERILE WATER/SALINE, 10 ML: HCPCS

## 2022-08-12 PROCEDURE — 74177 CT ABD & PELVIS W/CONTRAST: CPT

## 2022-08-12 PROCEDURE — 74018 RADEX ABDOMEN 1 VIEW: CPT

## 2022-08-12 PROCEDURE — 96376 TX/PRO/DX INJ SAME DRUG ADON: CPT

## 2022-08-12 PROCEDURE — 99285 EMERGENCY DEPT VISIT HI MDM: CPT

## 2022-08-12 PROCEDURE — 96374 THER/PROPH/DIAG INJ IV PUSH: CPT

## 2022-08-13 VITALS
RESPIRATION RATE: 18 BRPM | SYSTOLIC BLOOD PRESSURE: 155 MMHG | OXYGEN SATURATION: 97 % | HEART RATE: 98 BPM | TEMPERATURE: 97.88 F | DIASTOLIC BLOOD PRESSURE: 83 MMHG

## 2022-08-13 VITALS
HEART RATE: 93 BPM | SYSTOLIC BLOOD PRESSURE: 158 MMHG | TEMPERATURE: 98 F | RESPIRATION RATE: 16 BRPM | OXYGEN SATURATION: 96 % | DIASTOLIC BLOOD PRESSURE: 79 MMHG

## 2022-08-13 VITALS — RESPIRATION RATE: 18 BRPM

## 2022-08-13 LAB
DEPRECATED RDW RBC: 46.4 FL (ref 35.1–43.9)
ERYTHROCYTE [DISTWIDTH] IN BLOOD: 13.5 % (ref 11.6–14.6)
HCT VFR BLD AUTO: 38.8 % (ref 40–54)
HEMOGLOBIN: 12.1 G/DL (ref 13–16.5)
HGB BLD-MCNC: 12.1 G/DL (ref 13–16.5)
IMMATURE GRANULOCYTES COUNT: 0.06 X10^3/UL (ref 0–0)
MCV RBC: 93.9 FL (ref 80–94)
MEAN CORP HGB CONC: 31.2 G/DL (ref 32–36)
MEAN PLATELET VOL.: 9.8 FL (ref 6.2–12)
NRBC FLAGGED BY ANALYZER: 0 % (ref 0–5)
PLATELET # BLD: 229 K/MM3 (ref 150–450)
PLATELET COUNT: 229 K/MM3 (ref 150–450)
RBC # BLD AUTO: 4.13 M/MM3 (ref 4.6–6.2)
RBC DISTRIBUTION WIDTH CV: 13.5 % (ref 11.6–14.6)
RBC DISTRIBUTION WIDTH SD: 46.4 FL (ref 35.1–43.9)
WBC # BLD AUTO: 15.8 K/MM3 (ref 4.4–11)
WHITE BLOOD COUNT: 15.8 K/MM3 (ref 4.4–11)

## 2023-07-08 ENCOUNTER — INPATIENT HOSPITAL (OUTPATIENT)
Dept: URBAN - METROPOLITAN AREA HOSPITAL 50 | Facility: HOSPITAL | Age: 78
End: 2023-07-08
Payer: MEDICARE

## 2023-07-08 DIAGNOSIS — D64.9 ANEMIA, UNSPECIFIED: ICD-10-CM

## 2023-07-08 DIAGNOSIS — Z90.49 ACQUIRED ABSENCE OF OTHER SPECIFIED PARTS OF DIGESTIVE TRACT: ICD-10-CM

## 2023-07-08 DIAGNOSIS — J44.1 CHRONIC OBSTRUCTIVE PULMONARY DISEASE WITH (ACUTE) EXACERBAT: ICD-10-CM

## 2023-07-08 DIAGNOSIS — Z85.038 PERSONAL HISTORY OF OTHER MALIGNANT NEOPLASM OF LARGE INTEST: ICD-10-CM

## 2023-07-08 DIAGNOSIS — J96.20 ACUTE AND CHRONIC RESPIRATORY FAILURE, UNSPECIFIED WHETHER W: ICD-10-CM

## 2023-07-08 DIAGNOSIS — R13.12 DYSPHAGIA, OROPHARYNGEAL PHASE: ICD-10-CM

## 2023-07-08 PROCEDURE — 99222 1ST HOSP IP/OBS MODERATE 55: CPT

## 2023-07-09 ENCOUNTER — INPATIENT HOSPITAL (OUTPATIENT)
Dept: URBAN - METROPOLITAN AREA HOSPITAL 50 | Facility: HOSPITAL | Age: 78
End: 2023-07-09
Payer: MEDICARE

## 2023-07-09 DIAGNOSIS — R13.12 DYSPHAGIA, OROPHARYNGEAL PHASE: ICD-10-CM

## 2023-07-09 DIAGNOSIS — D64.9 ANEMIA, UNSPECIFIED: ICD-10-CM

## 2023-07-09 DIAGNOSIS — J44.1 CHRONIC OBSTRUCTIVE PULMONARY DISEASE WITH (ACUTE) EXACERBAT: ICD-10-CM

## 2023-07-09 DIAGNOSIS — J96.20 ACUTE AND CHRONIC RESPIRATORY FAILURE, UNSPECIFIED WHETHER W: ICD-10-CM

## 2023-07-09 DIAGNOSIS — K44.9 DIAPHRAGMATIC HERNIA WITHOUT OBSTRUCTION OR GANGRENE: ICD-10-CM

## 2023-07-09 DIAGNOSIS — K31.89 OTHER DISEASES OF STOMACH AND DUODENUM: ICD-10-CM

## 2023-07-09 DIAGNOSIS — K31.7 POLYP OF STOMACH AND DUODENUM: ICD-10-CM

## 2023-07-09 PROCEDURE — 99233 SBSQ HOSP IP/OBS HIGH 50: CPT | Performed by: INTERNAL MEDICINE

## 2023-07-10 PROCEDURE — 43450 DILATE ESOPHAGUS 1/MULT PASS: CPT | Performed by: INTERNAL MEDICINE

## 2023-07-10 PROCEDURE — 43239 EGD BIOPSY SINGLE/MULTIPLE: CPT | Performed by: INTERNAL MEDICINE

## 2023-07-11 ENCOUNTER — INPATIENT HOSPITAL (OUTPATIENT)
Dept: URBAN - METROPOLITAN AREA HOSPITAL 50 | Facility: HOSPITAL | Age: 78
End: 2023-07-11
Payer: MEDICARE

## 2023-07-11 DIAGNOSIS — K64.4 RESIDUAL HEMORRHOIDAL SKIN TAGS: ICD-10-CM

## 2023-07-11 DIAGNOSIS — D12.3 BENIGN NEOPLASM OF TRANSVERSE COLON: ICD-10-CM

## 2023-07-11 DIAGNOSIS — R19.7 DIARRHEA, UNSPECIFIED: ICD-10-CM

## 2023-07-11 DIAGNOSIS — D12.4 BENIGN NEOPLASM OF DESCENDING COLON: ICD-10-CM

## 2023-07-11 DIAGNOSIS — D12.5 BENIGN NEOPLASM OF SIGMOID COLON: ICD-10-CM

## 2023-07-11 DIAGNOSIS — Z90.49 ACQUIRED ABSENCE OF OTHER SPECIFIED PARTS OF DIGESTIVE TRACT: ICD-10-CM

## 2023-07-11 DIAGNOSIS — K64.8 OTHER HEMORRHOIDS: ICD-10-CM

## 2023-07-11 DIAGNOSIS — K57.30 DIVERTICULOSIS OF LARGE INTESTINE WITHOUT PERFORATION OR ABS: ICD-10-CM

## 2023-07-11 PROCEDURE — 45385 COLONOSCOPY W/LESION REMOVAL: CPT | Performed by: INTERNAL MEDICINE

## 2023-07-11 PROCEDURE — 45381 COLONOSCOPY SUBMUCOUS NJX: CPT | Performed by: INTERNAL MEDICINE

## 2023-07-12 ENCOUNTER — INPATIENT HOSPITAL (OUTPATIENT)
Dept: URBAN - METROPOLITAN AREA HOSPITAL 50 | Facility: HOSPITAL | Age: 78
End: 2023-07-12
Payer: COMMERCIAL

## 2023-07-12 DIAGNOSIS — R13.12 DYSPHAGIA, OROPHARYNGEAL PHASE: ICD-10-CM

## 2023-07-12 DIAGNOSIS — Z90.49 ACQUIRED ABSENCE OF OTHER SPECIFIED PARTS OF DIGESTIVE TRACT: ICD-10-CM

## 2023-07-12 DIAGNOSIS — K57.30 DIVERTICULOSIS OF LARGE INTESTINE WITHOUT PERFORATION OR ABS: ICD-10-CM

## 2023-07-12 DIAGNOSIS — D64.9 ANEMIA, UNSPECIFIED: ICD-10-CM

## 2023-07-12 DIAGNOSIS — Z85.038 PERSONAL HISTORY OF OTHER MALIGNANT NEOPLASM OF LARGE INTEST: ICD-10-CM

## 2023-07-12 PROCEDURE — 99233 SBSQ HOSP IP/OBS HIGH 50: CPT | Performed by: CLINICAL NURSE SPECIALIST

## 2023-08-04 ENCOUNTER — HOSPITAL ENCOUNTER (EMERGENCY)
Age: 78
Discharge: NURSING, REHAB OR LONG-TERM CARE | End: 2023-08-04
Payer: MEDICARE

## 2023-08-04 VITALS — BODY MASS INDEX: 38 KG/M2

## 2023-08-04 VITALS — HEART RATE: 74 BPM | RESPIRATION RATE: 26 BRPM

## 2023-08-04 VITALS
RESPIRATION RATE: 19 BRPM | DIASTOLIC BLOOD PRESSURE: 67 MMHG | SYSTOLIC BLOOD PRESSURE: 132 MMHG | OXYGEN SATURATION: 97 % | HEART RATE: 71 BPM

## 2023-08-04 VITALS
DIASTOLIC BLOOD PRESSURE: 66 MMHG | TEMPERATURE: 98.42 F | SYSTOLIC BLOOD PRESSURE: 119 MMHG | OXYGEN SATURATION: 91 % | HEART RATE: 74 BPM | RESPIRATION RATE: 24 BRPM

## 2023-08-04 VITALS
HEART RATE: 78 BPM | SYSTOLIC BLOOD PRESSURE: 125 MMHG | OXYGEN SATURATION: 95 % | RESPIRATION RATE: 18 BRPM | DIASTOLIC BLOOD PRESSURE: 57 MMHG

## 2023-08-04 VITALS
SYSTOLIC BLOOD PRESSURE: 119 MMHG | OXYGEN SATURATION: 96 % | HEART RATE: 71 BPM | RESPIRATION RATE: 28 BRPM | DIASTOLIC BLOOD PRESSURE: 66 MMHG

## 2023-08-04 DIAGNOSIS — I10: ICD-10-CM

## 2023-08-04 DIAGNOSIS — E78.5: ICD-10-CM

## 2023-08-04 DIAGNOSIS — F32.A: ICD-10-CM

## 2023-08-04 DIAGNOSIS — J44.1: Primary | ICD-10-CM

## 2023-08-04 DIAGNOSIS — K21.9: ICD-10-CM

## 2023-08-04 DIAGNOSIS — Z79.51: ICD-10-CM

## 2023-08-04 DIAGNOSIS — Z79.899: ICD-10-CM

## 2023-08-04 DIAGNOSIS — Z79.82: ICD-10-CM

## 2023-08-04 DIAGNOSIS — Z99.89: ICD-10-CM

## 2023-08-04 DIAGNOSIS — Z87.891: ICD-10-CM

## 2023-08-04 LAB
ANION GAP: 0 (ref 5–15)
BNP,B-TYPE NATRIURETIC PEPTIDE: 95.5 PG/ML (ref 0–100)
BUN SERPL-MCNC: 29 MG/DL (ref 7–18)
BUN/CREAT RATIO: 32.5 RATIO (ref 10–20)
CALCIUM SERPL-MCNC: 8.4 MG/DL (ref 8.5–10.1)
CARBON DIOXIDE: 37 MMOL/L (ref 21–32)
CELLS COUNTED: 100
CHLORIDE: 99 MMOL/L (ref 98–107)
DEPRECATED RDW RBC: 45.8 FL (ref 35.1–43.9)
DIFFERENTIAL INDICATED: MANUAL DIFF
ERYTHROCYTE [DISTWIDTH] IN BLOOD: 12.5 % (ref 11.6–14.6)
EST GLOM FILT RATE - AFR AMER: 107 ML/MIN (ref 60–?)
ESTIMATED CREATININE CLEARANCE: 68.31 ML/MIN
GLUCOSE: 128 MG/DL (ref 74–106)
HCT VFR BLD AUTO: 43.2 % (ref 40–54)
HEMOGLOBIN: 12.8 G/DL (ref 13–16.5)
HGB BLD-MCNC: 12.8 G/DL (ref 13–16.5)
MCV RBC: 99.8 FL (ref 80–94)
MEAN CORP HGB CONC: 29.6 G/DL (ref 32–36)
MEAN PLATELET VOL.: 10.4 FL (ref 6.2–12)
NEUTROPHIL-BAND: 3 % (ref 0–5)
NEUTROPHIL-SEGMENTED: 80 % (ref 47–70)
PLATELET # BLD: 262 K/MM3 (ref 150–450)
PLATELET COUNT: 262 K/MM3 (ref 150–450)
POSITIVE COUNT: YES
POSITIVE MORPHOLOGY: YES
POTASSIUM: 5.5 MMOL/L (ref 3.5–5.1)
RBC # BLD AUTO: 4.33 M/MM3 (ref 4.6–6.2)
RBC DISTRIBUTION WIDTH CV: 12.5 % (ref 11.6–14.6)
RBC DISTRIBUTION WIDTH SD: 45.8 FL (ref 35.1–43.9)
RBC MORPH BLD: (no result) NORMAL
RBC UR QL: 10 /UL
RED CELL MORPHOLOGY: (no result) NORMAL
SCAN SMEAR PER REVIEW CRITERIA: MANUAL DIFF
SP GR UR: 1.02 (ref 1–1.03)
TOTAL CELLS COUNTED: 100
URINE PRESERVATIVE: (no result)
WBC # BLD AUTO: 15.8 K/MM3 (ref 4.4–11)
WHITE BLOOD COUNT: 15.8 K/MM3 (ref 4.4–11)

## 2023-08-04 PROCEDURE — 81002 URINALYSIS NONAUTO W/O SCOPE: CPT

## 2023-08-04 PROCEDURE — A4216 STERILE WATER/SALINE, 10 ML: HCPCS

## 2023-08-04 PROCEDURE — 85025 COMPLETE CBC W/AUTO DIFF WBC: CPT

## 2023-08-04 PROCEDURE — 99285 EMERGENCY DEPT VISIT HI MDM: CPT

## 2023-08-04 PROCEDURE — 93005 ELECTROCARDIOGRAM TRACING: CPT

## 2023-08-04 PROCEDURE — 71045 X-RAY EXAM CHEST 1 VIEW: CPT

## 2023-08-04 PROCEDURE — 94640 AIRWAY INHALATION TREATMENT: CPT

## 2023-08-04 PROCEDURE — 96374 THER/PROPH/DIAG INJ IV PUSH: CPT

## 2023-08-04 PROCEDURE — 80048 BASIC METABOLIC PNL TOTAL CA: CPT

## 2023-08-04 PROCEDURE — 83880 ASSAY OF NATRIURETIC PEPTIDE: CPT

## 2023-08-28 ENCOUNTER — OFFICE (OUTPATIENT)
Dept: URBAN - METROPOLITAN AREA CLINIC 26 | Facility: CLINIC | Age: 78
End: 2023-08-28
Payer: MEDICARE

## 2023-08-28 VITALS
WEIGHT: 246 LBS | TEMPERATURE: 97.9 F | HEART RATE: 72 BPM | SYSTOLIC BLOOD PRESSURE: 145 MMHG | HEIGHT: 68 IN | DIASTOLIC BLOOD PRESSURE: 69 MMHG

## 2023-08-28 DIAGNOSIS — Z86.010 PERSONAL HISTORY OF COLONIC POLYPS: ICD-10-CM

## 2023-08-28 DIAGNOSIS — Z85.038 PERSONAL HISTORY OF OTHER MALIGNANT NEOPLASM OF LARGE INTEST: ICD-10-CM

## 2023-08-28 DIAGNOSIS — R19.7 DIARRHEA, UNSPECIFIED: ICD-10-CM

## 2023-08-28 DIAGNOSIS — K62.5 HEMORRHAGE OF ANUS AND RECTUM: ICD-10-CM

## 2023-08-28 DIAGNOSIS — K44.9 DIAPHRAGMATIC HERNIA WITHOUT OBSTRUCTION OR GANGRENE: ICD-10-CM

## 2023-08-28 PROCEDURE — 99214 OFFICE O/P EST MOD 30 MIN: CPT | Performed by: CLINICAL NURSE SPECIALIST

## 2023-08-28 RX ORDER — HYDROCORTISONE ACETATE 25 MG/1
SUPPOSITORY RECTAL
Qty: 28 | Refills: 0 | Status: COMPLETED
Start: 2023-08-28 | End: 2023-10-05

## 2023-08-28 RX ORDER — LIDOCAINE 50 MG/G
CREAM TOPICAL
Qty: 1 | Refills: 0 | Status: COMPLETED
Start: 2023-08-28 | End: 2023-10-05

## 2023-09-06 ENCOUNTER — HOSPITAL ENCOUNTER (OUTPATIENT)
Age: 78
Discharge: HOME | End: 2023-09-06
Payer: MEDICARE

## 2023-09-06 DIAGNOSIS — K62.5: ICD-10-CM

## 2023-09-06 DIAGNOSIS — K44.9: ICD-10-CM

## 2023-09-06 DIAGNOSIS — Z85.038: Primary | ICD-10-CM

## 2023-09-06 DIAGNOSIS — Z86.010: ICD-10-CM

## 2023-09-06 DIAGNOSIS — R19.7: ICD-10-CM

## 2023-09-06 PROCEDURE — 87493 C DIFF AMPLIFIED PROBE: CPT

## 2023-09-06 PROCEDURE — 87209 SMEAR COMPLEX STAIN: CPT

## 2023-09-06 PROCEDURE — 87177 OVA AND PARASITES SMEARS: CPT

## 2023-09-06 PROCEDURE — 83993 ASSAY FOR CALPROTECTIN FECAL: CPT

## 2023-09-10 LAB — CALPROTECTIN, STOOL: 153 UG/G (ref 0–120)

## 2023-10-05 ENCOUNTER — OFFICE (OUTPATIENT)
Dept: URBAN - METROPOLITAN AREA CLINIC 26 | Facility: CLINIC | Age: 78
End: 2023-10-05
Payer: MEDICARE

## 2023-10-05 VITALS
HEART RATE: 67 BPM | TEMPERATURE: 98.4 F | SYSTOLIC BLOOD PRESSURE: 129 MMHG | HEIGHT: 68 IN | DIASTOLIC BLOOD PRESSURE: 65 MMHG | WEIGHT: 247 LBS

## 2023-10-05 DIAGNOSIS — Z85.038 PERSONAL HISTORY OF OTHER MALIGNANT NEOPLASM OF LARGE INTEST: ICD-10-CM

## 2023-10-05 DIAGNOSIS — Z86.010 PERSONAL HISTORY OF COLONIC POLYPS: ICD-10-CM

## 2023-10-05 DIAGNOSIS — K62.89 OTHER SPECIFIED DISEASES OF ANUS AND RECTUM: ICD-10-CM

## 2023-10-05 DIAGNOSIS — K60.2 ANAL FISSURE, UNSPECIFIED: ICD-10-CM

## 2023-10-05 DIAGNOSIS — K62.5 HEMORRHAGE OF ANUS AND RECTUM: ICD-10-CM

## 2023-10-05 PROCEDURE — 99214 OFFICE O/P EST MOD 30 MIN: CPT | Performed by: CLINICAL NURSE SPECIALIST

## 2023-10-05 RX ORDER — HYDROCORTISONE ACETATE 25 MG/1
SUPPOSITORY RECTAL
Qty: 28 | Refills: 0 | Status: COMPLETED
Start: 2023-08-28 | End: 2023-10-05

## 2023-10-05 RX ORDER — LIDOCAINE 50 MG/G
CREAM TOPICAL
Qty: 1 | Refills: 0 | Status: COMPLETED
Start: 2023-08-28 | End: 2023-10-05

## 2023-12-01 ENCOUNTER — HOSPITAL ENCOUNTER (INPATIENT)
Dept: HOSPITAL 100 - ED | Age: 78
LOS: 4 days | Discharge: NURSING, REHAB OR LONG-TERM CARE | DRG: 189 | End: 2023-12-05
Payer: MEDICARE

## 2023-12-01 VITALS
SYSTOLIC BLOOD PRESSURE: 145 MMHG | TEMPERATURE: 97.9 F | DIASTOLIC BLOOD PRESSURE: 67 MMHG | OXYGEN SATURATION: 94 % | HEART RATE: 96 BPM | RESPIRATION RATE: 20 BRPM

## 2023-12-01 VITALS
SYSTOLIC BLOOD PRESSURE: 167 MMHG | DIASTOLIC BLOOD PRESSURE: 67 MMHG | RESPIRATION RATE: 21 BRPM | HEART RATE: 89 BPM | OXYGEN SATURATION: 87 % | TEMPERATURE: 96.62 F

## 2023-12-01 VITALS — RESPIRATION RATE: 15 BRPM | OXYGEN SATURATION: 95 % | HEART RATE: 88 BPM

## 2023-12-01 VITALS — RESPIRATION RATE: 12 BRPM | OXYGEN SATURATION: 84 %

## 2023-12-01 VITALS
BODY MASS INDEX: 34.1 KG/M2 | BODY MASS INDEX: 38.8 KG/M2 | BODY MASS INDEX: 34 KG/M2 | BODY MASS INDEX: 34.5 KG/M2 | BODY MASS INDEX: 34.7 KG/M2

## 2023-12-01 VITALS
SYSTOLIC BLOOD PRESSURE: 150 MMHG | OXYGEN SATURATION: 92 % | TEMPERATURE: 96.6 F | DIASTOLIC BLOOD PRESSURE: 71 MMHG | HEART RATE: 98 BPM | RESPIRATION RATE: 15 BRPM

## 2023-12-01 VITALS
HEART RATE: 95 BPM | OXYGEN SATURATION: 90 % | DIASTOLIC BLOOD PRESSURE: 63 MMHG | RESPIRATION RATE: 22 BRPM | SYSTOLIC BLOOD PRESSURE: 144 MMHG

## 2023-12-01 VITALS
HEART RATE: 95 BPM | SYSTOLIC BLOOD PRESSURE: 144 MMHG | OXYGEN SATURATION: 90 % | RESPIRATION RATE: 22 BRPM | DIASTOLIC BLOOD PRESSURE: 63 MMHG

## 2023-12-01 VITALS
HEART RATE: 153 BPM | SYSTOLIC BLOOD PRESSURE: 154 MMHG | DIASTOLIC BLOOD PRESSURE: 61 MMHG | RESPIRATION RATE: 24 BRPM | OXYGEN SATURATION: 95 % | TEMPERATURE: 98.06 F

## 2023-12-01 VITALS
OXYGEN SATURATION: 96 % | SYSTOLIC BLOOD PRESSURE: 140 MMHG | HEART RATE: 97 BPM | TEMPERATURE: 99.32 F | RESPIRATION RATE: 21 BRPM | DIASTOLIC BLOOD PRESSURE: 72 MMHG

## 2023-12-01 VITALS — OXYGEN SATURATION: 96 % | HEART RATE: 95 BPM

## 2023-12-01 VITALS — HEART RATE: 114 BPM | DIASTOLIC BLOOD PRESSURE: 61 MMHG | SYSTOLIC BLOOD PRESSURE: 154 MMHG

## 2023-12-01 VITALS
OXYGEN SATURATION: 96 % | TEMPERATURE: 99.14 F | HEART RATE: 108 BPM | DIASTOLIC BLOOD PRESSURE: 115 MMHG | SYSTOLIC BLOOD PRESSURE: 156 MMHG | RESPIRATION RATE: 21 BRPM

## 2023-12-01 VITALS
TEMPERATURE: 98.2 F | RESPIRATION RATE: 26 BRPM | OXYGEN SATURATION: 96 % | SYSTOLIC BLOOD PRESSURE: 165 MMHG | HEART RATE: 98 BPM | DIASTOLIC BLOOD PRESSURE: 84 MMHG

## 2023-12-01 VITALS
TEMPERATURE: 98.3 F | OXYGEN SATURATION: 94 % | RESPIRATION RATE: 20 BRPM | SYSTOLIC BLOOD PRESSURE: 130 MMHG | HEART RATE: 88 BPM | DIASTOLIC BLOOD PRESSURE: 63 MMHG

## 2023-12-01 VITALS
HEART RATE: 92 BPM | DIASTOLIC BLOOD PRESSURE: 63 MMHG | TEMPERATURE: 98.3 F | RESPIRATION RATE: 21 BRPM | OXYGEN SATURATION: 92 % | SYSTOLIC BLOOD PRESSURE: 144 MMHG

## 2023-12-01 VITALS
OXYGEN SATURATION: 98 % | DIASTOLIC BLOOD PRESSURE: 65 MMHG | HEART RATE: 91 BPM | TEMPERATURE: 98.1 F | RESPIRATION RATE: 19 BRPM | SYSTOLIC BLOOD PRESSURE: 143 MMHG

## 2023-12-01 VITALS
HEART RATE: 109 BPM | SYSTOLIC BLOOD PRESSURE: 131 MMHG | TEMPERATURE: 99.14 F | RESPIRATION RATE: 12 BRPM | OXYGEN SATURATION: 96 % | DIASTOLIC BLOOD PRESSURE: 71 MMHG

## 2023-12-01 VITALS
DIASTOLIC BLOOD PRESSURE: 69 MMHG | RESPIRATION RATE: 21 BRPM | SYSTOLIC BLOOD PRESSURE: 139 MMHG | OXYGEN SATURATION: 96 % | TEMPERATURE: 99.14 F | HEART RATE: 98 BPM

## 2023-12-01 VITALS
DIASTOLIC BLOOD PRESSURE: 72 MMHG | HEART RATE: 96 BPM | OXYGEN SATURATION: 94 % | TEMPERATURE: 97.88 F | RESPIRATION RATE: 20 BRPM | SYSTOLIC BLOOD PRESSURE: 142 MMHG

## 2023-12-01 VITALS
RESPIRATION RATE: 27 BRPM | DIASTOLIC BLOOD PRESSURE: 69 MMHG | OXYGEN SATURATION: 94 % | SYSTOLIC BLOOD PRESSURE: 132 MMHG | HEART RATE: 100 BPM | TEMPERATURE: 98 F

## 2023-12-01 VITALS — OXYGEN SATURATION: 96 %

## 2023-12-01 VITALS — HEART RATE: 88 BPM | RESPIRATION RATE: 18 BRPM

## 2023-12-01 VITALS — SYSTOLIC BLOOD PRESSURE: 132 MMHG | TEMPERATURE: 98 F | DIASTOLIC BLOOD PRESSURE: 69 MMHG

## 2023-12-01 VITALS
HEART RATE: 87 BPM | RESPIRATION RATE: 18 BRPM | SYSTOLIC BLOOD PRESSURE: 142 MMHG | OXYGEN SATURATION: 94 % | DIASTOLIC BLOOD PRESSURE: 76 MMHG | TEMPERATURE: 97.88 F

## 2023-12-01 VITALS — HEART RATE: 95 BPM | RESPIRATION RATE: 21 BRPM | OXYGEN SATURATION: 95 %

## 2023-12-01 VITALS — HEART RATE: 87 BPM | RESPIRATION RATE: 15 BRPM

## 2023-12-01 VITALS — RESPIRATION RATE: 12 BRPM | HEART RATE: 93 BPM | OXYGEN SATURATION: 93 %

## 2023-12-01 DIAGNOSIS — J44.1: ICD-10-CM

## 2023-12-01 DIAGNOSIS — I11.0: ICD-10-CM

## 2023-12-01 DIAGNOSIS — N40.0: ICD-10-CM

## 2023-12-01 DIAGNOSIS — J96.21: Primary | ICD-10-CM

## 2023-12-01 DIAGNOSIS — Z79.51: ICD-10-CM

## 2023-12-01 DIAGNOSIS — Z87.891: ICD-10-CM

## 2023-12-01 DIAGNOSIS — I48.91: ICD-10-CM

## 2023-12-01 DIAGNOSIS — J96.22: ICD-10-CM

## 2023-12-01 DIAGNOSIS — E87.29: ICD-10-CM

## 2023-12-01 DIAGNOSIS — I48.0: ICD-10-CM

## 2023-12-01 DIAGNOSIS — Z79.82: ICD-10-CM

## 2023-12-01 DIAGNOSIS — G47.33: ICD-10-CM

## 2023-12-01 DIAGNOSIS — K21.9: ICD-10-CM

## 2023-12-01 DIAGNOSIS — Z79.01: ICD-10-CM

## 2023-12-01 DIAGNOSIS — E66.9: ICD-10-CM

## 2023-12-01 DIAGNOSIS — I50.9: ICD-10-CM

## 2023-12-01 DIAGNOSIS — E78.5: ICD-10-CM

## 2023-12-01 DIAGNOSIS — Z66: ICD-10-CM

## 2023-12-01 LAB
ANION GAP: 1 (ref 5–15)
BASE EXCESS BLDV CALC-SCNC: 6 MMOL/L
BASE EXCESS BLDV CALC-SCNC: 8 MMOL/L
BILIRUB UR QL STRIP: 1 MG/DL
BNP,B-TYPE NATRIURETIC PEPTIDE: 139.7 PG/ML (ref 0–100)
BUN SERPL-MCNC: 19 MG/DL (ref 7–18)
BUN/CREAT RATIO: 20.8 RATIO (ref 10–20)
CALCIUM SERPL-MCNC: 9.2 MG/DL (ref 8.5–10.1)
CARBON DIOXIDE: 34 MMOL/L (ref 21–32)
CHLORIDE: 104 MMOL/L (ref 98–107)
DEPRECATED RDW RBC: 56 FL (ref 35.1–43.9)
DIFFERENTIAL COMMENT: (no result)
DIFFERENTIAL INDICATED: (no result)
ERYTHROCYTE [DISTWIDTH] IN BLOOD: 14.8 % (ref 11.6–14.6)
EST GLOM FILT RATE - AFR AMER: 104 ML/MIN (ref 60–?)
ESTIMATED CREATININE CLEARANCE: 66.81 ML/MIN
FI02: 5
FI02: 60
GLUCOSE: 108 MG/DL (ref 74–106)
HCT VFR BLD AUTO: 46.9 % (ref 40–54)
HEMOGLOBIN: 12.8 G/DL (ref 13–16.5)
HGB BLD-MCNC: 12.8 G/DL (ref 13–16.5)
IMMATURE GRANULOCYTES COUNT: 0.24 X10^3/UL (ref 0–0)
KETONE-DIPSTICK: 50 MG/DL
MANUAL DIF COMMENT BLD-IMP: (no result)
MCV RBC: 101.3 FL (ref 80–94)
MEAN CORP HGB CONC: 27.3 G/DL (ref 32–36)
MEAN PLATELET VOL.: 10.5 FL (ref 6.2–12)
MUCOUS THREADS URNS QL MICRO: (no result) /HPF
NRBC FLAGGED BY ANALYZER: 0 % (ref 0–5)
PEEP: 12
PLATELET # BLD: 195 K/MM3 (ref 150–450)
PLATELET COUNT: 195 K/MM3 (ref 150–450)
PO2 BLDA: 74 MMHG (ref 75–100)
PO2 BLDA: 79 MMHG (ref 75–100)
POSITIVE DIFFERENTIAL: YES
POTASSIUM: 3.6 MMOL/L (ref 3.5–5.1)
PROT UR QL STRIP.AUTO: 30 MG/DL
RBC # BLD AUTO: 4.63 M/MM3 (ref 4.6–6.2)
RBC DISTRIBUTION WIDTH CV: 14.8 % (ref 11.6–14.6)
RBC DISTRIBUTION WIDTH SD: 56 FL (ref 35.1–43.9)
RBC UR QL: (no result) /HPF (ref 0–5)
RBC UR QL: 25 /UL
RR: 12
SCAN SMEAR PER REVIEW CRITERIA: (no result)
SO2: 89 % (ref 95–99)
SO2: 90 % (ref 95–99)
SP GR UR: 1.03 (ref 1–1.03)
SQUAMOUS URNS QL MICRO: (no result) /HPF (ref 0–5)
TROPONIN-I HS: 7 PG/ML (ref 3–78)
URINE PRESERVATIVE: (no result)
WBC # BLD AUTO: 22.4 K/MM3 (ref 4.4–11)
WHITE BLOOD COUNT: 22.4 K/MM3 (ref 4.4–11)

## 2023-12-01 PROCEDURE — 83880 ASSAY OF NATRIURETIC PEPTIDE: CPT

## 2023-12-01 PROCEDURE — 83605 ASSAY OF LACTIC ACID: CPT

## 2023-12-01 PROCEDURE — 87633 RESP VIRUS 12-25 TARGETS: CPT

## 2023-12-01 PROCEDURE — 97110 THERAPEUTIC EXERCISES: CPT

## 2023-12-01 PROCEDURE — 94003 VENT MGMT INPAT SUBQ DAY: CPT

## 2023-12-01 PROCEDURE — G0463 HOSPITAL OUTPT CLINIC VISIT: HCPCS

## 2023-12-01 PROCEDURE — 97162 PT EVAL MOD COMPLEX 30 MIN: CPT

## 2023-12-01 PROCEDURE — 97530 THERAPEUTIC ACTIVITIES: CPT

## 2023-12-01 PROCEDURE — 71045 X-RAY EXAM CHEST 1 VIEW: CPT

## 2023-12-01 PROCEDURE — 94668 MNPJ CHEST WALL SBSQ: CPT

## 2023-12-01 PROCEDURE — 99285 EMERGENCY DEPT VISIT HI MDM: CPT

## 2023-12-01 PROCEDURE — 81001 URINALYSIS AUTO W/SCOPE: CPT

## 2023-12-01 PROCEDURE — 93005 ELECTROCARDIOGRAM TRACING: CPT

## 2023-12-01 PROCEDURE — 97535 SELF CARE MNGMENT TRAINING: CPT

## 2023-12-01 PROCEDURE — 36415 COLL VENOUS BLD VENIPUNCTURE: CPT

## 2023-12-01 PROCEDURE — 82803 BLOOD GASES ANY COMBINATION: CPT

## 2023-12-01 PROCEDURE — 94640 AIRWAY INHALATION TREATMENT: CPT

## 2023-12-01 PROCEDURE — 36600 WITHDRAWAL OF ARTERIAL BLOOD: CPT

## 2023-12-01 PROCEDURE — 99252 IP/OBS CONSLTJ NEW/EST SF 35: CPT

## 2023-12-01 PROCEDURE — A4216 STERILE WATER/SALINE, 10 ML: HCPCS

## 2023-12-01 PROCEDURE — 84484 ASSAY OF TROPONIN QUANT: CPT

## 2023-12-01 PROCEDURE — 94762 N-INVAS EAR/PLS OXIMTRY CONT: CPT

## 2023-12-01 PROCEDURE — 80048 BASIC METABOLIC PNL TOTAL CA: CPT

## 2023-12-01 PROCEDURE — 94002 VENT MGMT INPAT INIT DAY: CPT

## 2023-12-01 PROCEDURE — 87040 BLOOD CULTURE FOR BACTERIA: CPT

## 2023-12-01 PROCEDURE — 80053 COMPREHEN METABOLIC PANEL: CPT

## 2023-12-01 PROCEDURE — 87449 NOS EACH ORGANISM AG IA: CPT

## 2023-12-01 PROCEDURE — 97165 OT EVAL LOW COMPLEX 30 MIN: CPT

## 2023-12-01 PROCEDURE — 85025 COMPLETE CBC W/AUTO DIFF WBC: CPT

## 2023-12-01 RX ADMIN — SODIUM CHLORIDE 999 ML: 9 INJECTION, SOLUTION INTRAVENOUS at 09:30

## 2023-12-01 RX ADMIN — DILTIAZEM HYDROCHLORIDE 5 MG: 5 INJECTION INTRAVENOUS at 23:42

## 2023-12-01 RX ADMIN — AZITHROMYCIN DIHYDRATE 250 MG: 500 INJECTION, POWDER, LYOPHILIZED, FOR SOLUTION INTRAVENOUS at 16:51

## 2023-12-01 RX ADMIN — PIPERACILLIN AND TAZOBACTAM 200 GM: 4; .5 INJECTION, POWDER, FOR SOLUTION INTRAVENOUS at 12:01

## 2023-12-02 VITALS — HEART RATE: 100 BPM | DIASTOLIC BLOOD PRESSURE: 65 MMHG | SYSTOLIC BLOOD PRESSURE: 137 MMHG

## 2023-12-02 VITALS — DIASTOLIC BLOOD PRESSURE: 69 MMHG | SYSTOLIC BLOOD PRESSURE: 81 MMHG | HEART RATE: 102 BPM

## 2023-12-02 VITALS
HEART RATE: 102 BPM | TEMPERATURE: 99.2 F | SYSTOLIC BLOOD PRESSURE: 145 MMHG | DIASTOLIC BLOOD PRESSURE: 48 MMHG | OXYGEN SATURATION: 93 % | RESPIRATION RATE: 29 BRPM

## 2023-12-02 VITALS — SYSTOLIC BLOOD PRESSURE: 149 MMHG | DIASTOLIC BLOOD PRESSURE: 97 MMHG | HEART RATE: 97 BPM

## 2023-12-02 VITALS — DIASTOLIC BLOOD PRESSURE: 78 MMHG | HEART RATE: 96 BPM | SYSTOLIC BLOOD PRESSURE: 137 MMHG

## 2023-12-02 VITALS
DIASTOLIC BLOOD PRESSURE: 70 MMHG | OXYGEN SATURATION: 94 % | RESPIRATION RATE: 26 BRPM | SYSTOLIC BLOOD PRESSURE: 128 MMHG | HEART RATE: 107 BPM | TEMPERATURE: 98.42 F

## 2023-12-02 VITALS
OXYGEN SATURATION: 93 % | DIASTOLIC BLOOD PRESSURE: 75 MMHG | TEMPERATURE: 98.3 F | HEART RATE: 72 BPM | RESPIRATION RATE: 21 BRPM | SYSTOLIC BLOOD PRESSURE: 129 MMHG

## 2023-12-02 VITALS — RESPIRATION RATE: 19 BRPM | HEART RATE: 81 BPM | OXYGEN SATURATION: 98 %

## 2023-12-02 VITALS — HEART RATE: 95 BPM | DIASTOLIC BLOOD PRESSURE: 76 MMHG | SYSTOLIC BLOOD PRESSURE: 139 MMHG

## 2023-12-02 VITALS
TEMPERATURE: 99.3 F | HEART RATE: 86 BPM | SYSTOLIC BLOOD PRESSURE: 104 MMHG | DIASTOLIC BLOOD PRESSURE: 67 MMHG | OXYGEN SATURATION: 97 % | RESPIRATION RATE: 23 BRPM

## 2023-12-02 VITALS
SYSTOLIC BLOOD PRESSURE: 132 MMHG | TEMPERATURE: 99.32 F | RESPIRATION RATE: 21 BRPM | DIASTOLIC BLOOD PRESSURE: 64 MMHG | OXYGEN SATURATION: 95 % | HEART RATE: 106 BPM

## 2023-12-02 VITALS
SYSTOLIC BLOOD PRESSURE: 146 MMHG | HEART RATE: 91 BPM | DIASTOLIC BLOOD PRESSURE: 63 MMHG | TEMPERATURE: 99.5 F | RESPIRATION RATE: 27 BRPM | OXYGEN SATURATION: 95 %

## 2023-12-02 VITALS
RESPIRATION RATE: 23 BRPM | TEMPERATURE: 98.24 F | SYSTOLIC BLOOD PRESSURE: 126 MMHG | OXYGEN SATURATION: 96 % | DIASTOLIC BLOOD PRESSURE: 76 MMHG | HEART RATE: 96 BPM

## 2023-12-02 VITALS — HEART RATE: 112 BPM | SYSTOLIC BLOOD PRESSURE: 133 MMHG | DIASTOLIC BLOOD PRESSURE: 76 MMHG

## 2023-12-02 VITALS
HEART RATE: 90 BPM | TEMPERATURE: 99.5 F | RESPIRATION RATE: 21 BRPM | DIASTOLIC BLOOD PRESSURE: 70 MMHG | SYSTOLIC BLOOD PRESSURE: 132 MMHG

## 2023-12-02 VITALS
TEMPERATURE: 98.96 F | SYSTOLIC BLOOD PRESSURE: 118 MMHG | DIASTOLIC BLOOD PRESSURE: 74 MMHG | RESPIRATION RATE: 28 BRPM | OXYGEN SATURATION: 94 % | HEART RATE: 80 BPM

## 2023-12-02 VITALS — OXYGEN SATURATION: 97 % | RESPIRATION RATE: 20 BRPM | HEART RATE: 70 BPM

## 2023-12-02 VITALS
OXYGEN SATURATION: 93 % | SYSTOLIC BLOOD PRESSURE: 112 MMHG | TEMPERATURE: 98.42 F | DIASTOLIC BLOOD PRESSURE: 79 MMHG | RESPIRATION RATE: 24 BRPM | HEART RATE: 105 BPM

## 2023-12-02 VITALS — OXYGEN SATURATION: 98 % | TEMPERATURE: 97.7 F | RESPIRATION RATE: 26 BRPM | HEART RATE: 91 BPM

## 2023-12-02 VITALS
TEMPERATURE: 98.7 F | HEART RATE: 83 BPM | RESPIRATION RATE: 20 BRPM | SYSTOLIC BLOOD PRESSURE: 139 MMHG | DIASTOLIC BLOOD PRESSURE: 76 MMHG | OXYGEN SATURATION: 95 %

## 2023-12-02 VITALS
HEART RATE: 72 BPM | RESPIRATION RATE: 17 BRPM | TEMPERATURE: 98.2 F | DIASTOLIC BLOOD PRESSURE: 67 MMHG | OXYGEN SATURATION: 94 % | SYSTOLIC BLOOD PRESSURE: 138 MMHG

## 2023-12-02 VITALS — HEART RATE: 82 BPM | RESPIRATION RATE: 20 BRPM

## 2023-12-02 VITALS — SYSTOLIC BLOOD PRESSURE: 139 MMHG | HEART RATE: 68 BPM | DIASTOLIC BLOOD PRESSURE: 65 MMHG

## 2023-12-02 VITALS
TEMPERATURE: 97.7 F | SYSTOLIC BLOOD PRESSURE: 150 MMHG | OXYGEN SATURATION: 97 % | HEART RATE: 102 BPM | RESPIRATION RATE: 20 BRPM | DIASTOLIC BLOOD PRESSURE: 70 MMHG

## 2023-12-02 VITALS
HEART RATE: 80 BPM | TEMPERATURE: 98.24 F | DIASTOLIC BLOOD PRESSURE: 82 MMHG | RESPIRATION RATE: 21 BRPM | SYSTOLIC BLOOD PRESSURE: 137 MMHG | OXYGEN SATURATION: 98 %

## 2023-12-02 VITALS — DIASTOLIC BLOOD PRESSURE: 64 MMHG | HEART RATE: 106 BPM | SYSTOLIC BLOOD PRESSURE: 132 MMHG

## 2023-12-02 VITALS
DIASTOLIC BLOOD PRESSURE: 65 MMHG | OXYGEN SATURATION: 95 % | HEART RATE: 95 BPM | TEMPERATURE: 99.14 F | SYSTOLIC BLOOD PRESSURE: 81 MMHG | RESPIRATION RATE: 28 BRPM

## 2023-12-02 VITALS — RESPIRATION RATE: 20 BRPM | TEMPERATURE: 98.7 F | HEART RATE: 96 BPM | OXYGEN SATURATION: 97 %

## 2023-12-02 VITALS — HEART RATE: 89 BPM | OXYGEN SATURATION: 95 % | RESPIRATION RATE: 21 BRPM

## 2023-12-02 VITALS — SYSTOLIC BLOOD PRESSURE: 128 MMHG | DIASTOLIC BLOOD PRESSURE: 76 MMHG | HEART RATE: 105 BPM

## 2023-12-02 VITALS
OXYGEN SATURATION: 98 % | TEMPERATURE: 97.1 F | HEART RATE: 96 BPM | RESPIRATION RATE: 19 BRPM | DIASTOLIC BLOOD PRESSURE: 59 MMHG | SYSTOLIC BLOOD PRESSURE: 157 MMHG

## 2023-12-02 VITALS — HEART RATE: 94 BPM | RESPIRATION RATE: 19 BRPM | OXYGEN SATURATION: 97 %

## 2023-12-02 VITALS — HEART RATE: 73 BPM | DIASTOLIC BLOOD PRESSURE: 64 MMHG | SYSTOLIC BLOOD PRESSURE: 136 MMHG

## 2023-12-02 VITALS
TEMPERATURE: 99.32 F | SYSTOLIC BLOOD PRESSURE: 130 MMHG | RESPIRATION RATE: 24 BRPM | HEART RATE: 93 BPM | DIASTOLIC BLOOD PRESSURE: 62 MMHG

## 2023-12-02 VITALS — TEMPERATURE: 98.9 F | RESPIRATION RATE: 24 BRPM | HEART RATE: 76 BPM | OXYGEN SATURATION: 94 %

## 2023-12-02 VITALS — DIASTOLIC BLOOD PRESSURE: 78 MMHG | SYSTOLIC BLOOD PRESSURE: 135 MMHG | HEART RATE: 109 BPM

## 2023-12-02 VITALS — SYSTOLIC BLOOD PRESSURE: 127 MMHG | HEART RATE: 97 BPM | DIASTOLIC BLOOD PRESSURE: 80 MMHG

## 2023-12-02 VITALS — SYSTOLIC BLOOD PRESSURE: 143 MMHG | HEART RATE: 68 BPM | DIASTOLIC BLOOD PRESSURE: 57 MMHG

## 2023-12-02 VITALS — RESPIRATION RATE: 18 BRPM | HEART RATE: 102 BPM

## 2023-12-02 VITALS
DIASTOLIC BLOOD PRESSURE: 79 MMHG | TEMPERATURE: 99.8 F | SYSTOLIC BLOOD PRESSURE: 131 MMHG | RESPIRATION RATE: 28 BRPM | HEART RATE: 94 BPM

## 2023-12-02 VITALS
RESPIRATION RATE: 19 BRPM | OXYGEN SATURATION: 95 % | SYSTOLIC BLOOD PRESSURE: 153 MMHG | HEART RATE: 80 BPM | TEMPERATURE: 98.24 F | DIASTOLIC BLOOD PRESSURE: 78 MMHG

## 2023-12-02 VITALS — HEART RATE: 84 BPM | RESPIRATION RATE: 20 BRPM

## 2023-12-02 VITALS — HEART RATE: 75 BPM | SYSTOLIC BLOOD PRESSURE: 139 MMHG | DIASTOLIC BLOOD PRESSURE: 65 MMHG

## 2023-12-02 VITALS — RESPIRATION RATE: 20 BRPM | OXYGEN SATURATION: 93 % | HEART RATE: 97 BPM

## 2023-12-02 VITALS — HEART RATE: 87 BPM | RESPIRATION RATE: 12 BRPM | OXYGEN SATURATION: 97 %

## 2023-12-02 VITALS — DIASTOLIC BLOOD PRESSURE: 79 MMHG | HEART RATE: 99 BPM | SYSTOLIC BLOOD PRESSURE: 103 MMHG

## 2023-12-02 VITALS — HEART RATE: 89 BPM | RESPIRATION RATE: 12 BRPM | OXYGEN SATURATION: 97 %

## 2023-12-02 LAB
ALANINE AMINOTRANSFER ALT/SGPT: 9 U/L (ref 16–61)
ALBUMIN SERPL-MCNC: 2.8 G/DL (ref 3.2–5)
ALKALINE PHOSPHATASE: 86 U/L (ref 45–117)
ANION GAP: 2 (ref 5–15)
AST(SGOT): 5 U/L (ref 15–37)
BUN SERPL-MCNC: 24 MG/DL (ref 7–18)
BUN/CREAT RATIO: 29.7 RATIO (ref 10–20)
CALCIUM SERPL-MCNC: 9.6 MG/DL (ref 8.5–10.1)
CARBON DIOXIDE: 36 MMOL/L (ref 21–32)
CHLORIDE: 103 MMOL/L (ref 98–107)
DEPRECATED RDW RBC: 54.3 FL (ref 35.1–43.9)
ERYTHROCYTE [DISTWIDTH] IN BLOOD: 15 % (ref 11.6–14.6)
EST GLOM FILT RATE - AFR AMER: 119 ML/MIN (ref 60–?)
ESTIMATED CREATININE CLEARANCE: 80.11 ML/MIN
GLOBULIN: 4.4 G/DL (ref 2.2–4.2)
GLUCOSE: 145 MG/DL (ref 74–106)
HCT VFR BLD AUTO: 44.3 % (ref 40–54)
HEMOGLOBIN: 12.5 G/DL (ref 13–16.5)
HGB BLD-MCNC: 12.5 G/DL (ref 13–16.5)
IMMATURE GRANULOCYTES COUNT: 0.18 X10^3/UL (ref 0–0)
MCV RBC: 98.2 FL (ref 80–94)
MEAN CORP HGB CONC: 28.2 G/DL (ref 32–36)
MEAN PLATELET VOL.: 10.4 FL (ref 6.2–12)
NRBC FLAGGED BY ANALYZER: 0 % (ref 0–5)
PLATELET # BLD: 225 K/MM3 (ref 150–450)
PLATELET COUNT: 225 K/MM3 (ref 150–450)
POTASSIUM: 3.7 MMOL/L (ref 3.5–5.1)
RBC # BLD AUTO: 4.51 M/MM3 (ref 4.6–6.2)
RBC DISTRIBUTION WIDTH CV: 15 % (ref 11.6–14.6)
RBC DISTRIBUTION WIDTH SD: 54.3 FL (ref 35.1–43.9)
WBC # BLD AUTO: 16.2 K/MM3 (ref 4.4–11)
WHITE BLOOD COUNT: 16.2 K/MM3 (ref 4.4–11)

## 2023-12-02 RX ADMIN — BUDESONIDE 0.5 MG: 0.5 SUSPENSION RESPIRATORY (INHALATION) at 19:06

## 2023-12-02 RX ADMIN — AZITHROMYCIN DIHYDRATE 250 MG: 500 INJECTION, POWDER, LYOPHILIZED, FOR SOLUTION INTRAVENOUS at 08:27

## 2023-12-02 RX ADMIN — Medication 120 ML: at 17:16

## 2023-12-02 RX ADMIN — APIXABAN 5 MG: 5 TABLET, FILM COATED ORAL at 08:19

## 2023-12-02 RX ADMIN — PIPERACILLIN SODIUM AND TAZOBACTAM SODIUM 12.5 GM: 3; .375 INJECTION, POWDER, LYOPHILIZED, FOR SOLUTION INTRAVENOUS at 17:17

## 2023-12-02 RX ADMIN — SODIUM CHLORIDE, PRESERVATIVE FREE 0 ML: 5 INJECTION INTRAVENOUS at 22:06

## 2023-12-02 RX ADMIN — PIPERACILLIN SODIUM AND TAZOBACTAM SODIUM 12.5 GM: 3; .375 INJECTION, POWDER, LYOPHILIZED, FOR SOLUTION INTRAVENOUS at 22:06

## 2023-12-02 RX ADMIN — SODIUM CHLORIDE, PRESERVATIVE FREE 0 ML: 5 INJECTION INTRAVENOUS at 08:20

## 2023-12-03 VITALS — OXYGEN SATURATION: 97 % | RESPIRATION RATE: 12 BRPM | HEART RATE: 67 BPM

## 2023-12-03 VITALS
HEART RATE: 94 BPM | RESPIRATION RATE: 26 BRPM | OXYGEN SATURATION: 94 % | TEMPERATURE: 97.6 F | SYSTOLIC BLOOD PRESSURE: 134 MMHG | DIASTOLIC BLOOD PRESSURE: 64 MMHG

## 2023-12-03 VITALS
RESPIRATION RATE: 27 BRPM | DIASTOLIC BLOOD PRESSURE: 63 MMHG | SYSTOLIC BLOOD PRESSURE: 104 MMHG | HEART RATE: 159 BPM | OXYGEN SATURATION: 93 %

## 2023-12-03 VITALS — HEART RATE: 75 BPM | RESPIRATION RATE: 12 BRPM | OXYGEN SATURATION: 95 %

## 2023-12-03 VITALS
DIASTOLIC BLOOD PRESSURE: 79 MMHG | SYSTOLIC BLOOD PRESSURE: 114 MMHG | RESPIRATION RATE: 23 BRPM | OXYGEN SATURATION: 93 % | HEART RATE: 126 BPM

## 2023-12-03 VITALS — HEART RATE: 69 BPM | RESPIRATION RATE: 12 BRPM | OXYGEN SATURATION: 95 %

## 2023-12-03 VITALS
HEART RATE: 74 BPM | TEMPERATURE: 99.1 F | SYSTOLIC BLOOD PRESSURE: 107 MMHG | RESPIRATION RATE: 19 BRPM | DIASTOLIC BLOOD PRESSURE: 77 MMHG | OXYGEN SATURATION: 97 %

## 2023-12-03 VITALS — DIASTOLIC BLOOD PRESSURE: 63 MMHG | SYSTOLIC BLOOD PRESSURE: 104 MMHG | HEART RATE: 166 BPM

## 2023-12-03 VITALS
OXYGEN SATURATION: 95 % | DIASTOLIC BLOOD PRESSURE: 65 MMHG | RESPIRATION RATE: 23 BRPM | HEART RATE: 95 BPM | SYSTOLIC BLOOD PRESSURE: 117 MMHG

## 2023-12-03 VITALS
RESPIRATION RATE: 19 BRPM | DIASTOLIC BLOOD PRESSURE: 99 MMHG | HEART RATE: 78 BPM | OXYGEN SATURATION: 93 % | SYSTOLIC BLOOD PRESSURE: 121 MMHG | TEMPERATURE: 98.6 F

## 2023-12-03 VITALS
SYSTOLIC BLOOD PRESSURE: 130 MMHG | HEART RATE: 94 BPM | OXYGEN SATURATION: 96 % | RESPIRATION RATE: 27 BRPM | DIASTOLIC BLOOD PRESSURE: 62 MMHG

## 2023-12-03 VITALS — HEART RATE: 98 BPM | SYSTOLIC BLOOD PRESSURE: 134 MMHG | DIASTOLIC BLOOD PRESSURE: 64 MMHG

## 2023-12-03 VITALS
TEMPERATURE: 99.1 F | OXYGEN SATURATION: 93 % | RESPIRATION RATE: 23 BRPM | SYSTOLIC BLOOD PRESSURE: 115 MMHG | HEART RATE: 97 BPM | DIASTOLIC BLOOD PRESSURE: 60 MMHG

## 2023-12-03 VITALS
RESPIRATION RATE: 29 BRPM | TEMPERATURE: 99 F | SYSTOLIC BLOOD PRESSURE: 118 MMHG | OXYGEN SATURATION: 94 % | DIASTOLIC BLOOD PRESSURE: 56 MMHG | HEART RATE: 78 BPM

## 2023-12-03 VITALS — HEART RATE: 99 BPM | DIASTOLIC BLOOD PRESSURE: 68 MMHG | SYSTOLIC BLOOD PRESSURE: 132 MMHG

## 2023-12-03 VITALS
RESPIRATION RATE: 22 BRPM | HEART RATE: 89 BPM | OXYGEN SATURATION: 95 % | SYSTOLIC BLOOD PRESSURE: 120 MMHG | DIASTOLIC BLOOD PRESSURE: 57 MMHG

## 2023-12-03 VITALS
TEMPERATURE: 98.06 F | DIASTOLIC BLOOD PRESSURE: 68 MMHG | RESPIRATION RATE: 21 BRPM | OXYGEN SATURATION: 92 % | HEART RATE: 96 BPM | SYSTOLIC BLOOD PRESSURE: 132 MMHG

## 2023-12-03 VITALS — HEART RATE: 80 BPM | RESPIRATION RATE: 26 BRPM

## 2023-12-03 VITALS — RESPIRATION RATE: 22 BRPM | HEART RATE: 85 BPM | OXYGEN SATURATION: 99 %

## 2023-12-03 VITALS — OXYGEN SATURATION: 93 % | HEART RATE: 152 BPM | RESPIRATION RATE: 28 BRPM

## 2023-12-03 VITALS — RESPIRATION RATE: 14 BRPM | HEART RATE: 96 BPM

## 2023-12-03 VITALS — OXYGEN SATURATION: 99 %

## 2023-12-03 VITALS — HEART RATE: 132 BPM | RESPIRATION RATE: 24 BRPM

## 2023-12-03 VITALS — OXYGEN SATURATION: 93 %

## 2023-12-03 LAB
ANION GAP: 0 (ref 5–15)
BUN SERPL-MCNC: 37 MG/DL (ref 7–18)
BUN/CREAT RATIO: 41.4 RATIO (ref 10–20)
CALCIUM SERPL-MCNC: 8.9 MG/DL (ref 8.5–10.1)
CARBON DIOXIDE: 38 MMOL/L (ref 21–32)
CHLORIDE: 102 MMOL/L (ref 98–107)
DEPRECATED RDW RBC: 54.1 FL (ref 35.1–43.9)
ERYTHROCYTE [DISTWIDTH] IN BLOOD: 15 % (ref 11.6–14.6)
EST GLOM FILT RATE - AFR AMER: 106 ML/MIN (ref 60–?)
ESTIMATED CREATININE CLEARANCE: 72.91 ML/MIN
GLUCOSE: 147 MG/DL (ref 74–106)
HCT VFR BLD AUTO: 39.4 % (ref 40–54)
HEMOGLOBIN: 11.4 G/DL (ref 13–16.5)
HGB BLD-MCNC: 11.4 G/DL (ref 13–16.5)
IMMATURE GRANULOCYTES COUNT: 0.2 X10^3/UL (ref 0–0)
LEUKOCYTE ESTERASE UR QL STRIP: 100 /UL
MCV RBC: 96.6 FL (ref 80–94)
MEAN CORP HGB CONC: 28.9 G/DL (ref 32–36)
MEAN PLATELET VOL.: 10.4 FL (ref 6.2–12)
MUCOUS THREADS URNS QL MICRO: (no result) /HPF
NRBC FLAGGED BY ANALYZER: 0.1 % (ref 0–5)
PLATELET # BLD: 234 K/MM3 (ref 150–450)
PLATELET COUNT: 234 K/MM3 (ref 150–450)
POTASSIUM: 4 MMOL/L (ref 3.5–5.1)
PROT UR QL STRIP.AUTO: 30 MG/DL
RBC # BLD AUTO: 4.08 M/MM3 (ref 4.6–6.2)
RBC DISTRIBUTION WIDTH CV: 15 % (ref 11.6–14.6)
RBC DISTRIBUTION WIDTH SD: 54.1 FL (ref 35.1–43.9)
RBC UR QL: (no result) /HPF (ref 0–5)
RBC UR QL: 250 /UL
SP GR UR: 1.01 (ref 1–1.03)
SQUAMOUS URNS QL MICRO: (no result) /HPF (ref 0–5)
URINE PRESERVATIVE: (no result)
WBC # BLD AUTO: 20.8 K/MM3 (ref 4.4–11)
WHITE BLOOD COUNT: 20.8 K/MM3 (ref 4.4–11)

## 2023-12-03 RX ADMIN — PIPERACILLIN SODIUM AND TAZOBACTAM SODIUM 12.5 GM: 3; .375 INJECTION, POWDER, LYOPHILIZED, FOR SOLUTION INTRAVENOUS at 20:09

## 2023-12-03 RX ADMIN — Medication 120 ML: at 09:34

## 2023-12-03 RX ADMIN — BUDESONIDE 0.5 MG: 0.5 SUSPENSION RESPIRATORY (INHALATION) at 18:38

## 2023-12-03 RX ADMIN — BUDESONIDE 0.5 MG: 0.5 SUSPENSION RESPIRATORY (INHALATION) at 07:13

## 2023-12-03 RX ADMIN — PIPERACILLIN SODIUM AND TAZOBACTAM SODIUM 12.5 GM: 3; .375 INJECTION, POWDER, LYOPHILIZED, FOR SOLUTION INTRAVENOUS at 06:15

## 2023-12-03 RX ADMIN — Medication 120 ML: at 12:35

## 2023-12-03 RX ADMIN — APIXABAN 5 MG: 5 TABLET, FILM COATED ORAL at 09:35

## 2023-12-03 RX ADMIN — PIPERACILLIN SODIUM AND TAZOBACTAM SODIUM 12.5 GM: 3; .375 INJECTION, POWDER, LYOPHILIZED, FOR SOLUTION INTRAVENOUS at 14:18

## 2023-12-03 RX ADMIN — APIXABAN 5 MG: 5 TABLET, FILM COATED ORAL at 20:06

## 2023-12-03 RX ADMIN — SODIUM CHLORIDE, PRESERVATIVE FREE 0 ML: 5 INJECTION INTRAVENOUS at 20:09

## 2023-12-03 RX ADMIN — AZITHROMYCIN DIHYDRATE 250 MG: 500 INJECTION, POWDER, LYOPHILIZED, FOR SOLUTION INTRAVENOUS at 11:41

## 2023-12-03 RX ADMIN — SODIUM CHLORIDE, PRESERVATIVE FREE 0 ML: 5 INJECTION INTRAVENOUS at 09:33

## 2023-12-04 VITALS — OXYGEN SATURATION: 97 % | HEART RATE: 73 BPM | RESPIRATION RATE: 22 BRPM

## 2023-12-04 VITALS
HEART RATE: 92 BPM | SYSTOLIC BLOOD PRESSURE: 140 MMHG | DIASTOLIC BLOOD PRESSURE: 56 MMHG | RESPIRATION RATE: 20 BRPM | TEMPERATURE: 98.4 F | OXYGEN SATURATION: 98 %

## 2023-12-04 VITALS
SYSTOLIC BLOOD PRESSURE: 156 MMHG | OXYGEN SATURATION: 94 % | TEMPERATURE: 97.52 F | HEART RATE: 82 BPM | DIASTOLIC BLOOD PRESSURE: 76 MMHG | RESPIRATION RATE: 20 BRPM

## 2023-12-04 VITALS — HEART RATE: 88 BPM | RESPIRATION RATE: 19 BRPM

## 2023-12-04 VITALS — OXYGEN SATURATION: 94 % | RESPIRATION RATE: 20 BRPM | HEART RATE: 64 BPM

## 2023-12-04 VITALS
DIASTOLIC BLOOD PRESSURE: 70 MMHG | RESPIRATION RATE: 22 BRPM | TEMPERATURE: 97.2 F | SYSTOLIC BLOOD PRESSURE: 133 MMHG | OXYGEN SATURATION: 96 % | HEART RATE: 67 BPM

## 2023-12-04 VITALS — HEART RATE: 60 BPM | RESPIRATION RATE: 20 BRPM | OXYGEN SATURATION: 95 %

## 2023-12-04 VITALS
SYSTOLIC BLOOD PRESSURE: 149 MMHG | HEART RATE: 72 BPM | DIASTOLIC BLOOD PRESSURE: 72 MMHG | TEMPERATURE: 97.52 F | OXYGEN SATURATION: 100 % | RESPIRATION RATE: 24 BRPM

## 2023-12-04 VITALS — HEART RATE: 91 BPM

## 2023-12-04 VITALS
TEMPERATURE: 97.6 F | OXYGEN SATURATION: 95 % | DIASTOLIC BLOOD PRESSURE: 74 MMHG | HEART RATE: 92 BPM | RESPIRATION RATE: 25 BRPM | SYSTOLIC BLOOD PRESSURE: 147 MMHG

## 2023-12-04 VITALS — OXYGEN SATURATION: 100 %

## 2023-12-04 VITALS
DIASTOLIC BLOOD PRESSURE: 84 MMHG | SYSTOLIC BLOOD PRESSURE: 124 MMHG | TEMPERATURE: 98.42 F | HEART RATE: 80 BPM | OXYGEN SATURATION: 90 % | RESPIRATION RATE: 25 BRPM

## 2023-12-04 VITALS
OXYGEN SATURATION: 100 % | TEMPERATURE: 98.24 F | DIASTOLIC BLOOD PRESSURE: 98 MMHG | RESPIRATION RATE: 18 BRPM | SYSTOLIC BLOOD PRESSURE: 123 MMHG | HEART RATE: 95 BPM

## 2023-12-04 VITALS — OXYGEN SATURATION: 96 %

## 2023-12-04 VITALS — HEART RATE: 81 BPM

## 2023-12-04 VITALS — RESPIRATION RATE: 22 BRPM | OXYGEN SATURATION: 97 % | HEART RATE: 88 BPM

## 2023-12-04 LAB
ANION GAP: 3 (ref 5–15)
BUN SERPL-MCNC: 39 MG/DL (ref 7–18)
BUN/CREAT RATIO: 41.5 RATIO (ref 10–20)
CALCIUM SERPL-MCNC: 9 MG/DL (ref 8.5–10.1)
CARBON DIOXIDE: 36 MMOL/L (ref 21–32)
CHLORIDE: 104 MMOL/L (ref 98–107)
DEPRECATED RDW RBC: 55.5 FL (ref 35.1–43.9)
DIFFERENTIAL INDICATED: (no result)
ERYTHROCYTE [DISTWIDTH] IN BLOOD: 15.3 % (ref 11.6–14.6)
EST GLOM FILT RATE - AFR AMER: 100 ML/MIN (ref 60–?)
ESTIMATED CREATININE CLEARANCE: 69.03 ML/MIN
GLUCOSE: 142 MG/DL (ref 74–106)
HCT VFR BLD AUTO: 44.7 % (ref 40–54)
HEMOGLOBIN: 12.6 G/DL (ref 13–16.5)
HGB BLD-MCNC: 12.6 G/DL (ref 13–16.5)
IMMATURE GRANULOCYTES COUNT: 0.14 X10^3/UL (ref 0–0)
MCV RBC: 98.2 FL (ref 80–94)
MEAN CORP HGB CONC: 28.2 G/DL (ref 32–36)
MEAN PLATELET VOL.: 10.2 FL (ref 6.2–12)
NRBC FLAGGED BY ANALYZER: 0.2 % (ref 0–5)
PLATELET # BLD: 272 K/MM3 (ref 150–450)
PLATELET COUNT: 272 K/MM3 (ref 150–450)
POTASSIUM: 3.6 MMOL/L (ref 3.5–5.1)
RBC # BLD AUTO: 4.55 M/MM3 (ref 4.6–6.2)
RBC DISTRIBUTION WIDTH CV: 15.3 % (ref 11.6–14.6)
RBC DISTRIBUTION WIDTH SD: 55.5 FL (ref 35.1–43.9)
SCAN SMEAR PER REVIEW CRITERIA: (no result)
WBC # BLD AUTO: 17.9 K/MM3 (ref 4.4–11)
WHITE BLOOD COUNT: 17.9 K/MM3 (ref 4.4–11)

## 2023-12-04 RX ADMIN — APIXABAN 5 MG: 5 TABLET, FILM COATED ORAL at 09:00

## 2023-12-04 RX ADMIN — BUDESONIDE 0.5 MG: 0.5 SUSPENSION RESPIRATORY (INHALATION) at 06:43

## 2023-12-04 RX ADMIN — APIXABAN 5 MG: 5 TABLET, FILM COATED ORAL at 22:18

## 2023-12-04 RX ADMIN — PIPERACILLIN SODIUM AND TAZOBACTAM SODIUM 12.5 GM: 3; .375 INJECTION, POWDER, LYOPHILIZED, FOR SOLUTION INTRAVENOUS at 22:22

## 2023-12-04 RX ADMIN — PIPERACILLIN SODIUM AND TAZOBACTAM SODIUM 12.5 GM: 3; .375 INJECTION, POWDER, LYOPHILIZED, FOR SOLUTION INTRAVENOUS at 05:28

## 2023-12-04 RX ADMIN — BUDESONIDE 0.5 MG: 0.5 SUSPENSION RESPIRATORY (INHALATION) at 19:02

## 2023-12-04 RX ADMIN — Medication 120 ML: at 12:57

## 2023-12-04 RX ADMIN — SODIUM CHLORIDE, PRESERVATIVE FREE 0 ML: 5 INJECTION INTRAVENOUS at 05:29

## 2023-12-04 RX ADMIN — AZITHROMYCIN DIHYDRATE 250 MG: 500 INJECTION, POWDER, LYOPHILIZED, FOR SOLUTION INTRAVENOUS at 10:06

## 2023-12-04 RX ADMIN — Medication 120 ML: at 09:02

## 2023-12-04 RX ADMIN — PIPERACILLIN SODIUM AND TAZOBACTAM SODIUM 12.5 GM: 3; .375 INJECTION, POWDER, LYOPHILIZED, FOR SOLUTION INTRAVENOUS at 13:02

## 2023-12-05 VITALS
TEMPERATURE: 97.6 F | DIASTOLIC BLOOD PRESSURE: 94 MMHG | RESPIRATION RATE: 24 BRPM | HEART RATE: 116 BPM | OXYGEN SATURATION: 95 % | SYSTOLIC BLOOD PRESSURE: 157 MMHG

## 2023-12-05 VITALS — HEART RATE: 110 BPM | RESPIRATION RATE: 16 BRPM | OXYGEN SATURATION: 93 %

## 2023-12-05 VITALS
TEMPERATURE: 98.1 F | OXYGEN SATURATION: 96 % | SYSTOLIC BLOOD PRESSURE: 116 MMHG | RESPIRATION RATE: 29 BRPM | HEART RATE: 89 BPM | DIASTOLIC BLOOD PRESSURE: 72 MMHG

## 2023-12-05 VITALS — HEART RATE: 85 BPM | SYSTOLIC BLOOD PRESSURE: 116 MMHG | DIASTOLIC BLOOD PRESSURE: 54 MMHG

## 2023-12-05 VITALS — OXYGEN SATURATION: 95 % | HEART RATE: 105 BPM | RESPIRATION RATE: 19 BRPM

## 2023-12-05 VITALS
DIASTOLIC BLOOD PRESSURE: 54 MMHG | OXYGEN SATURATION: 95 % | HEART RATE: 85 BPM | SYSTOLIC BLOOD PRESSURE: 116 MMHG | TEMPERATURE: 98.24 F | RESPIRATION RATE: 25 BRPM

## 2023-12-05 VITALS — HEART RATE: 108 BPM | RESPIRATION RATE: 25 BRPM | OXYGEN SATURATION: 93 %

## 2023-12-05 LAB
ANION GAP: 3 (ref 5–15)
BUN SERPL-MCNC: 36 MG/DL (ref 7–18)
BUN/CREAT RATIO: 42.5 RATIO (ref 10–20)
CALCIUM SERPL-MCNC: 9.2 MG/DL (ref 8.5–10.1)
CARBON DIOXIDE: 35 MMOL/L (ref 21–32)
CHLORIDE: 105 MMOL/L (ref 98–107)
DEPRECATED RDW RBC: 57 FL (ref 35.1–43.9)
ERYTHROCYTE [DISTWIDTH] IN BLOOD: 15.3 % (ref 11.6–14.6)
EST GLOM FILT RATE - AFR AMER: 113 ML/MIN (ref 60–?)
ESTIMATED CREATININE CLEARANCE: 76.34 ML/MIN
GLUCOSE: 140 MG/DL (ref 74–106)
HCT VFR BLD AUTO: 43.8 % (ref 40–54)
HEMOGLOBIN: 12.7 G/DL (ref 13–16.5)
HGB BLD-MCNC: 12.7 G/DL (ref 13–16.5)
IMMATURE GRANULOCYTES COUNT: 0.14 X10^3/UL (ref 0–0)
MCV RBC: 99.1 FL (ref 80–94)
MEAN CORP HGB CONC: 29 G/DL (ref 32–36)
MEAN PLATELET VOL.: 10.2 FL (ref 6.2–12)
NRBC FLAGGED BY ANALYZER: 0 % (ref 0–5)
PLATELET # BLD: 272 K/MM3 (ref 150–450)
PLATELET COUNT: 272 K/MM3 (ref 150–450)
POTASSIUM: 4.1 MMOL/L (ref 3.5–5.1)
RBC # BLD AUTO: 4.42 M/MM3 (ref 4.6–6.2)
RBC DISTRIBUTION WIDTH CV: 15.3 % (ref 11.6–14.6)
RBC DISTRIBUTION WIDTH SD: 57 FL (ref 35.1–43.9)
WBC # BLD AUTO: 15.6 K/MM3 (ref 4.4–11)
WHITE BLOOD COUNT: 15.6 K/MM3 (ref 4.4–11)

## 2023-12-05 RX ADMIN — APIXABAN 5 MG: 5 TABLET, FILM COATED ORAL at 10:39

## 2023-12-05 RX ADMIN — SODIUM CHLORIDE, PRESERVATIVE FREE 0 ML: 5 INJECTION INTRAVENOUS at 10:32

## 2023-12-05 RX ADMIN — PIPERACILLIN SODIUM AND TAZOBACTAM SODIUM 12.5 GM: 3; .375 INJECTION, POWDER, LYOPHILIZED, FOR SOLUTION INTRAVENOUS at 04:45

## 2023-12-05 RX ADMIN — Medication 120 ML: at 10:33

## 2023-12-05 RX ADMIN — BUDESONIDE 0.5 MG: 0.5 SUSPENSION RESPIRATORY (INHALATION) at 07:09

## 2023-12-14 ENCOUNTER — OFFICE (OUTPATIENT)
Dept: URBAN - METROPOLITAN AREA CLINIC 26 | Facility: CLINIC | Age: 78
End: 2023-12-14
Payer: COMMERCIAL

## 2023-12-14 VITALS
HEIGHT: 68 IN | SYSTOLIC BLOOD PRESSURE: 146 MMHG | WEIGHT: 246 LBS | TEMPERATURE: 97.4 F | DIASTOLIC BLOOD PRESSURE: 70 MMHG

## 2023-12-14 DIAGNOSIS — Z85.038 PERSONAL HISTORY OF OTHER MALIGNANT NEOPLASM OF LARGE INTEST: ICD-10-CM

## 2023-12-14 DIAGNOSIS — K64.9 UNSPECIFIED HEMORRHOIDS: ICD-10-CM

## 2023-12-14 DIAGNOSIS — Z86.010 PERSONAL HISTORY OF COLONIC POLYPS: ICD-10-CM

## 2023-12-14 DIAGNOSIS — K62.5 HEMORRHAGE OF ANUS AND RECTUM: ICD-10-CM

## 2023-12-14 DIAGNOSIS — K62.89 OTHER SPECIFIED DISEASES OF ANUS AND RECTUM: ICD-10-CM

## 2023-12-14 PROCEDURE — 99214 OFFICE O/P EST MOD 30 MIN: CPT | Performed by: CLINICAL NURSE SPECIALIST

## 2024-01-04 ENCOUNTER — OFFICE (OUTPATIENT)
Dept: URBAN - METROPOLITAN AREA CLINIC 26 | Facility: CLINIC | Age: 79
End: 2024-01-04
Payer: COMMERCIAL

## 2024-01-04 VITALS
SYSTOLIC BLOOD PRESSURE: 138 MMHG | HEIGHT: 68 IN | DIASTOLIC BLOOD PRESSURE: 81 MMHG | TEMPERATURE: 98.2 F | HEART RATE: 65 BPM

## 2024-01-04 DIAGNOSIS — K21.9 GASTRO-ESOPHAGEAL REFLUX DISEASE WITHOUT ESOPHAGITIS: ICD-10-CM

## 2024-01-04 DIAGNOSIS — Z86.010 PERSONAL HISTORY OF COLONIC POLYPS: ICD-10-CM

## 2024-01-04 DIAGNOSIS — Z85.038 PERSONAL HISTORY OF OTHER MALIGNANT NEOPLASM OF LARGE INTEST: ICD-10-CM

## 2024-01-04 DIAGNOSIS — K64.1 SECOND DEGREE HEMORRHOIDS: ICD-10-CM

## 2024-01-04 PROCEDURE — 46221 LIGATION OF HEMORRHOID(S): CPT | Performed by: INTERNAL MEDICINE

## 2024-01-04 PROCEDURE — 99214 OFFICE O/P EST MOD 30 MIN: CPT | Mod: 25 | Performed by: INTERNAL MEDICINE

## 2024-03-15 ENCOUNTER — HOSPITAL ENCOUNTER (INPATIENT)
Dept: HOSPITAL 100 - ED | Age: 79
LOS: 5 days | Discharge: NURSING, REHAB OR LONG-TERM CARE | DRG: 190 | End: 2024-03-20
Payer: MEDICARE

## 2024-03-15 VITALS — RESPIRATION RATE: 26 BRPM | SYSTOLIC BLOOD PRESSURE: 135 MMHG | HEART RATE: 95 BPM | DIASTOLIC BLOOD PRESSURE: 67 MMHG

## 2024-03-15 VITALS
OXYGEN SATURATION: 92 % | HEART RATE: 79 BPM | SYSTOLIC BLOOD PRESSURE: 141 MMHG | RESPIRATION RATE: 18 BRPM | DIASTOLIC BLOOD PRESSURE: 68 MMHG

## 2024-03-15 VITALS
OXYGEN SATURATION: 93 % | DIASTOLIC BLOOD PRESSURE: 65 MMHG | HEART RATE: 86 BPM | RESPIRATION RATE: 24 BRPM | SYSTOLIC BLOOD PRESSURE: 124 MMHG

## 2024-03-15 VITALS
SYSTOLIC BLOOD PRESSURE: 141 MMHG | TEMPERATURE: 98.2 F | OXYGEN SATURATION: 93 % | DIASTOLIC BLOOD PRESSURE: 90 MMHG | HEART RATE: 92 BPM | RESPIRATION RATE: 22 BRPM

## 2024-03-15 VITALS — HEART RATE: 96 BPM | OXYGEN SATURATION: 91 %

## 2024-03-15 VITALS
HEART RATE: 83 BPM | DIASTOLIC BLOOD PRESSURE: 64 MMHG | RESPIRATION RATE: 24 BRPM | SYSTOLIC BLOOD PRESSURE: 129 MMHG | TEMPERATURE: 98.1 F | OXYGEN SATURATION: 93 %

## 2024-03-15 VITALS
SYSTOLIC BLOOD PRESSURE: 136 MMHG | DIASTOLIC BLOOD PRESSURE: 56 MMHG | RESPIRATION RATE: 27 BRPM | HEART RATE: 98 BPM | OXYGEN SATURATION: 88 %

## 2024-03-15 VITALS — HEART RATE: 83 BPM | OXYGEN SATURATION: 93 %

## 2024-03-15 VITALS
DIASTOLIC BLOOD PRESSURE: 68 MMHG | RESPIRATION RATE: 21 BRPM | OXYGEN SATURATION: 93 % | SYSTOLIC BLOOD PRESSURE: 133 MMHG | HEART RATE: 88 BPM

## 2024-03-15 VITALS — RESPIRATION RATE: 14 BRPM | OXYGEN SATURATION: 94 % | HEART RATE: 85 BPM

## 2024-03-15 VITALS
RESPIRATION RATE: 29 BRPM | HEART RATE: 95 BPM | DIASTOLIC BLOOD PRESSURE: 75 MMHG | OXYGEN SATURATION: 91 % | SYSTOLIC BLOOD PRESSURE: 136 MMHG

## 2024-03-15 VITALS
RESPIRATION RATE: 27 BRPM | HEART RATE: 95 BPM | OXYGEN SATURATION: 92 % | SYSTOLIC BLOOD PRESSURE: 143 MMHG | DIASTOLIC BLOOD PRESSURE: 84 MMHG

## 2024-03-15 VITALS — OXYGEN SATURATION: 97 %

## 2024-03-15 VITALS
RESPIRATION RATE: 25 BRPM | HEART RATE: 92 BPM | SYSTOLIC BLOOD PRESSURE: 122 MMHG | DIASTOLIC BLOOD PRESSURE: 58 MMHG | OXYGEN SATURATION: 90 % | TEMPERATURE: 97.88 F

## 2024-03-15 VITALS
OXYGEN SATURATION: 94 % | DIASTOLIC BLOOD PRESSURE: 87 MMHG | TEMPERATURE: 98.1 F | SYSTOLIC BLOOD PRESSURE: 143 MMHG | RESPIRATION RATE: 26 BRPM | HEART RATE: 96 BPM

## 2024-03-15 VITALS
RESPIRATION RATE: 22 BRPM | DIASTOLIC BLOOD PRESSURE: 68 MMHG | HEART RATE: 90 BPM | BODY MASS INDEX: 34.2 KG/M2 | BODY MASS INDEX: 33.8 KG/M2 | SYSTOLIC BLOOD PRESSURE: 132 MMHG | BODY MASS INDEX: 34 KG/M2 | BODY MASS INDEX: 33.6 KG/M2 | TEMPERATURE: 98.96 F | BODY MASS INDEX: 34.1 KG/M2 | OXYGEN SATURATION: 93 % | BODY MASS INDEX: 34.7 KG/M2

## 2024-03-15 VITALS — RESPIRATION RATE: 19 BRPM | HEART RATE: 90 BPM | OXYGEN SATURATION: 90 %

## 2024-03-15 VITALS
SYSTOLIC BLOOD PRESSURE: 132 MMHG | DIASTOLIC BLOOD PRESSURE: 72 MMHG | HEART RATE: 92 BPM | OXYGEN SATURATION: 91 % | RESPIRATION RATE: 25 BRPM

## 2024-03-15 VITALS — RESPIRATION RATE: 30 BRPM | HEART RATE: 93 BPM

## 2024-03-15 VITALS — DIASTOLIC BLOOD PRESSURE: 69 MMHG | SYSTOLIC BLOOD PRESSURE: 121 MMHG | HEART RATE: 86 BPM

## 2024-03-15 VITALS
SYSTOLIC BLOOD PRESSURE: 139 MMHG | HEART RATE: 95 BPM | OXYGEN SATURATION: 83 % | RESPIRATION RATE: 31 BRPM | DIASTOLIC BLOOD PRESSURE: 56 MMHG

## 2024-03-15 VITALS
HEART RATE: 85 BPM | SYSTOLIC BLOOD PRESSURE: 122 MMHG | DIASTOLIC BLOOD PRESSURE: 62 MMHG | RESPIRATION RATE: 23 BRPM | OXYGEN SATURATION: 91 %

## 2024-03-15 VITALS
DIASTOLIC BLOOD PRESSURE: 75 MMHG | HEART RATE: 93 BPM | SYSTOLIC BLOOD PRESSURE: 136 MMHG | OXYGEN SATURATION: 95 % | RESPIRATION RATE: 25 BRPM

## 2024-03-15 VITALS
SYSTOLIC BLOOD PRESSURE: 135 MMHG | RESPIRATION RATE: 37 BRPM | DIASTOLIC BLOOD PRESSURE: 60 MMHG | HEART RATE: 92 BPM | OXYGEN SATURATION: 94 %

## 2024-03-15 VITALS — RESPIRATION RATE: 21 BRPM | HEART RATE: 94 BPM | OXYGEN SATURATION: 90 %

## 2024-03-15 VITALS — RESPIRATION RATE: 16 BRPM | OXYGEN SATURATION: 93 % | HEART RATE: 90 BPM

## 2024-03-15 VITALS
SYSTOLIC BLOOD PRESSURE: 132 MMHG | OXYGEN SATURATION: 91 % | RESPIRATION RATE: 23 BRPM | DIASTOLIC BLOOD PRESSURE: 71 MMHG | HEART RATE: 101 BPM

## 2024-03-15 VITALS — RESPIRATION RATE: 14 BRPM | HEART RATE: 95 BPM | OXYGEN SATURATION: 91 %

## 2024-03-15 VITALS — HEART RATE: 91 BPM | OXYGEN SATURATION: 93 % | RESPIRATION RATE: 19 BRPM

## 2024-03-15 VITALS
SYSTOLIC BLOOD PRESSURE: 124 MMHG | OXYGEN SATURATION: 90 % | DIASTOLIC BLOOD PRESSURE: 60 MMHG | HEART RATE: 90 BPM | RESPIRATION RATE: 17 BRPM

## 2024-03-15 VITALS
SYSTOLIC BLOOD PRESSURE: 144 MMHG | HEART RATE: 85 BPM | RESPIRATION RATE: 27 BRPM | OXYGEN SATURATION: 90 % | DIASTOLIC BLOOD PRESSURE: 79 MMHG

## 2024-03-15 VITALS — SYSTOLIC BLOOD PRESSURE: 136 MMHG | DIASTOLIC BLOOD PRESSURE: 70 MMHG | RESPIRATION RATE: 37 BRPM | HEART RATE: 92 BPM

## 2024-03-15 VITALS — HEART RATE: 92 BPM | SYSTOLIC BLOOD PRESSURE: 129 MMHG | RESPIRATION RATE: 23 BRPM | DIASTOLIC BLOOD PRESSURE: 59 MMHG

## 2024-03-15 VITALS — DIASTOLIC BLOOD PRESSURE: 66 MMHG | SYSTOLIC BLOOD PRESSURE: 145 MMHG | RESPIRATION RATE: 27 BRPM | HEART RATE: 96 BPM

## 2024-03-15 VITALS
HEART RATE: 96 BPM | SYSTOLIC BLOOD PRESSURE: 144 MMHG | DIASTOLIC BLOOD PRESSURE: 79 MMHG | RESPIRATION RATE: 28 BRPM | OXYGEN SATURATION: 93 %

## 2024-03-15 VITALS
SYSTOLIC BLOOD PRESSURE: 141 MMHG | RESPIRATION RATE: 19 BRPM | OXYGEN SATURATION: 93 % | HEART RATE: 96 BPM | DIASTOLIC BLOOD PRESSURE: 90 MMHG

## 2024-03-15 VITALS — DIASTOLIC BLOOD PRESSURE: 70 MMHG | SYSTOLIC BLOOD PRESSURE: 126 MMHG

## 2024-03-15 VITALS — DIASTOLIC BLOOD PRESSURE: 67 MMHG | SYSTOLIC BLOOD PRESSURE: 151 MMHG | RESPIRATION RATE: 22 BRPM | HEART RATE: 94 BPM

## 2024-03-15 DIAGNOSIS — M19.90: ICD-10-CM

## 2024-03-15 DIAGNOSIS — J84.9: ICD-10-CM

## 2024-03-15 DIAGNOSIS — I11.0: ICD-10-CM

## 2024-03-15 DIAGNOSIS — I48.0: ICD-10-CM

## 2024-03-15 DIAGNOSIS — I50.32: ICD-10-CM

## 2024-03-15 DIAGNOSIS — R53.81: ICD-10-CM

## 2024-03-15 DIAGNOSIS — J96.22: ICD-10-CM

## 2024-03-15 DIAGNOSIS — Z79.51: ICD-10-CM

## 2024-03-15 DIAGNOSIS — R73.9: ICD-10-CM

## 2024-03-15 DIAGNOSIS — E87.29: ICD-10-CM

## 2024-03-15 DIAGNOSIS — J96.21: ICD-10-CM

## 2024-03-15 DIAGNOSIS — B97.4: ICD-10-CM

## 2024-03-15 DIAGNOSIS — D64.9: ICD-10-CM

## 2024-03-15 DIAGNOSIS — Z85.038: ICD-10-CM

## 2024-03-15 DIAGNOSIS — J44.0: ICD-10-CM

## 2024-03-15 DIAGNOSIS — J35.1: ICD-10-CM

## 2024-03-15 DIAGNOSIS — N40.0: ICD-10-CM

## 2024-03-15 DIAGNOSIS — E66.9: ICD-10-CM

## 2024-03-15 DIAGNOSIS — J44.1: Primary | ICD-10-CM

## 2024-03-15 DIAGNOSIS — F41.8: ICD-10-CM

## 2024-03-15 DIAGNOSIS — K21.9: ICD-10-CM

## 2024-03-15 DIAGNOSIS — G47.33: ICD-10-CM

## 2024-03-15 DIAGNOSIS — G25.81: ICD-10-CM

## 2024-03-15 DIAGNOSIS — E78.5: ICD-10-CM

## 2024-03-15 DIAGNOSIS — J02.0: ICD-10-CM

## 2024-03-15 DIAGNOSIS — Z87.891: ICD-10-CM

## 2024-03-15 DIAGNOSIS — Z66: ICD-10-CM

## 2024-03-15 DIAGNOSIS — J39.2: ICD-10-CM

## 2024-03-15 DIAGNOSIS — E87.5: ICD-10-CM

## 2024-03-15 LAB
ANION GAP: 3 (ref 5–15)
BASE EXCESS BLDV CALC-SCNC: 10 MMOL/L
BASE EXCESS BLDV CALC-SCNC: 9 MMOL/L
BASE EXCESS BLDV CALC-SCNC: 9 MMOL/L
BNP,B-TYPE NATRIURETIC PEPTIDE: 140.1 PG/ML (ref 0–100)
BUN SERPL-MCNC: 19 MG/DL (ref 7–18)
BUN/CREAT RATIO: 24.4 RATIO (ref 10–20)
CALCIUM SERPL-MCNC: 8.9 MG/DL (ref 8.5–10.1)
CARBON DIOXIDE: 33 MMOL/L (ref 21–32)
CHLORIDE: 105 MMOL/L (ref 98–107)
COMMENT: (no result)
DEPRECATED RDW RBC: 46.6 FL (ref 35.1–43.9)
DIFFERENTIAL INDICATED: (no result)
ERYTHROCYTE [DISTWIDTH] IN BLOOD: 12.8 % (ref 11.6–14.6)
EST GLOM FILT RATE - AFR AMER: 124 ML/MIN (ref 60–?)
ESTIMATED CREATININE CLEARANCE: 97.47 ML/MIN
FI02: 4
FI02: 40
FI02: 50
GLUCOSE: 120 MG/DL (ref 74–106)
HCT VFR BLD AUTO: 41 % (ref 40–54)
HGB BLD-MCNC: 11.9 G/DL (ref 13–16.5)
IMMATURE GRANULOCYTES COUNT: 0.09 X10^3/UL (ref 0–0)
MAGNESIUM: 1.8 MG/DL (ref 1.6–2.6)
MANUAL DIF COMMENT BLD-IMP: (no result)
MCV RBC: 99.8 FL (ref 80–94)
MEAN CORP HGB CONC: 29 G/DL (ref 32–36)
MEAN PLATELET VOL.: 10.9 FL (ref 6.2–12)
NRBC FLAGGED BY ANALYZER: 0 % (ref 0–5)
PEEP: 8
PEEP: 8
PLATELET # BLD: 194 K/MM3 (ref 150–450)
PO2 BLDA: 52 MMHG (ref 75–100)
PO2 BLDA: 81 MMHG (ref 75–100)
PO2 BLDA: 87 MMHG (ref 75–100)
POSITIVE DIFFERENTIAL: YES
POTASSIUM: 5.3 MMOL/L (ref 3.5–5.1)
PROCALCITONIN SERPL-MCNC: 0.07 NG/ML (ref 0–0.09)
RBC # BLD AUTO: 4.11 M/MM3 (ref 4.6–6.2)
RR: 12
RR: 14
SCAN SMEAR PER REVIEW CRITERIA: (no result)
SO2: 78 % (ref 95–99)
SO2: 92 % (ref 95–99)
SO2: 94 % (ref 95–99)
TIME GIVEN: (no result)
WBC # BLD AUTO: 19 K/MM3 (ref 4.4–11)

## 2024-03-15 PROCEDURE — 83605 ASSAY OF LACTIC ACID: CPT

## 2024-03-15 PROCEDURE — 92610 EVALUATE SWALLOWING FUNCTION: CPT

## 2024-03-15 PROCEDURE — 94002 VENT MGMT INPAT INIT DAY: CPT

## 2024-03-15 PROCEDURE — 87651 STREP A DNA AMP PROBE: CPT

## 2024-03-15 PROCEDURE — 71045 X-RAY EXAM CHEST 1 VIEW: CPT

## 2024-03-15 PROCEDURE — 87641 MR-STAPH DNA AMP PROBE: CPT

## 2024-03-15 PROCEDURE — 97163 PT EVAL HIGH COMPLEX 45 MIN: CPT

## 2024-03-15 PROCEDURE — 70491 CT SOFT TISSUE NECK W/DYE: CPT

## 2024-03-15 PROCEDURE — 87040 BLOOD CULTURE FOR BACTERIA: CPT

## 2024-03-15 PROCEDURE — 36600 WITHDRAWAL OF ARTERIAL BLOOD: CPT

## 2024-03-15 PROCEDURE — 97166 OT EVAL MOD COMPLEX 45 MIN: CPT

## 2024-03-15 PROCEDURE — 99284 EMERGENCY DEPT VISIT MOD MDM: CPT

## 2024-03-15 PROCEDURE — 80202 ASSAY OF VANCOMYCIN: CPT

## 2024-03-15 PROCEDURE — 94640 AIRWAY INHALATION TREATMENT: CPT

## 2024-03-15 PROCEDURE — 84443 ASSAY THYROID STIM HORMONE: CPT

## 2024-03-15 PROCEDURE — 94660 CPAP INITIATION&MGMT: CPT

## 2024-03-15 PROCEDURE — 97535 SELF CARE MNGMENT TRAINING: CPT

## 2024-03-15 PROCEDURE — 80048 BASIC METABOLIC PNL TOTAL CA: CPT

## 2024-03-15 PROCEDURE — 94762 N-INVAS EAR/PLS OXIMTRY CONT: CPT

## 2024-03-15 PROCEDURE — 85025 COMPLETE CBC W/AUTO DIFF WBC: CPT

## 2024-03-15 PROCEDURE — 84145 PROCALCITONIN (PCT): CPT

## 2024-03-15 PROCEDURE — 97802 MEDICAL NUTRITION INDIV IN: CPT

## 2024-03-15 PROCEDURE — A4216 STERILE WATER/SALINE, 10 ML: HCPCS

## 2024-03-15 PROCEDURE — 94668 MNPJ CHEST WALL SBSQ: CPT

## 2024-03-15 PROCEDURE — 94003 VENT MGMT INPAT SUBQ DAY: CPT

## 2024-03-15 PROCEDURE — 92526 ORAL FUNCTION THERAPY: CPT

## 2024-03-15 PROCEDURE — 87631 RESP VIRUS 3-5 TARGETS: CPT

## 2024-03-15 PROCEDURE — 83735 ASSAY OF MAGNESIUM: CPT

## 2024-03-15 PROCEDURE — 97803 MED NUTRITION INDIV SUBSEQ: CPT

## 2024-03-15 PROCEDURE — 83880 ASSAY OF NATRIURETIC PEPTIDE: CPT

## 2024-03-15 PROCEDURE — 82803 BLOOD GASES ANY COMBINATION: CPT

## 2024-03-15 PROCEDURE — 80053 COMPREHEN METABOLIC PANEL: CPT

## 2024-03-15 PROCEDURE — 84100 ASSAY OF PHOSPHORUS: CPT

## 2024-03-15 PROCEDURE — 97530 THERAPEUTIC ACTIVITIES: CPT

## 2024-03-15 RX ADMIN — PIPERACILLIN SODIUM AND TAZOBACTAM SODIUM 12.5 GM: 3; .375 INJECTION, POWDER, LYOPHILIZED, FOR SOLUTION INTRAVENOUS at 21:14

## 2024-03-15 RX ADMIN — PIPERACILLIN SODIUM AND TAZOBACTAM SODIUM 100 GM: 3; .375 INJECTION, POWDER, LYOPHILIZED, FOR SOLUTION INTRAVENOUS at 03:58

## 2024-03-15 RX ADMIN — PIPERACILLIN SODIUM AND TAZOBACTAM SODIUM 12.5 GM: 3; .375 INJECTION, POWDER, LYOPHILIZED, FOR SOLUTION INTRAVENOUS at 14:06

## 2024-03-15 RX ADMIN — VANCOMYCIN HYDROCHLORIDE 250 MG: 1 INJECTION, POWDER, LYOPHILIZED, FOR SOLUTION INTRAVENOUS at 10:06

## 2024-03-15 RX ADMIN — ALBUTEROL SULFATE 2.5 MG: 2.5 SOLUTION RESPIRATORY (INHALATION) at 03:04

## 2024-03-15 RX ADMIN — SODIUM CHLORIDE 15 ML: 9 INJECTION, SOLUTION INTRAVENOUS at 10:08

## 2024-03-15 RX ADMIN — SODIUM CHLORIDE, PRESERVATIVE FREE 0 ML: 5 INJECTION INTRAVENOUS at 21:14

## 2024-03-15 RX ADMIN — VANCOMYCIN HYDROCHLORIDE 167 MG: 1 INJECTION, POWDER, LYOPHILIZED, FOR SOLUTION INTRAVENOUS at 18:48

## 2024-03-15 RX ADMIN — PANTOPRAZOLE SODIUM 330 MG: 40 INJECTION, POWDER, FOR SOLUTION INTRAVENOUS at 09:47

## 2024-03-15 NOTE — [id number]
HPI
History of Present Illness
Chief Complaint: Shortness of Breath
Informant: patient and EMS
Narrative
Narrative: 
Patient is a 78-year-old male with past medical history of COPD on chronic 3 L of oxygen as well as hypertension.  He states that for the last 2 or 3 days he has had increased congestion and cough.  He reports that he also has not had the ability to 
swallow.  He states that despite wearing his oxygen and taking his medications he feels like his shortness of breath is worsening and secondary to his nursing home sent him in for evaluation

[facility]
Medical History (Updated 03/15/24 @ 07:38 by Dr. Stan Ortiz, )

Acute respiratory failure, unspecified whether with hypoxia or hypercapnia
Adult failure to thrive
Anxiety
Asthma
Back pain
Chronic obstructive lung disease
Colon cancer
Confusion
COPD (chronic obstructive pulmonary disease)
COPD (chronic obstructive pulmonary disease)
Dementia
Depression
Dysphagia, oropharyngeal phase
Emphysema, interstitial
Former smoker
Gastroesophageal reflux disease
History of colon cancer
History of colonic diverticulitis
HTN (hypertension)
Hyperlipidemia
Hypertension
Insomnia, unspecified
Obstructive sleep apnea (adult) (pediatric)
Osteoarthritis
Paroxysmal atrial fibrillation
Primary osteoarthritis, right shoulder
Right shoulder pain


Home Medications

tamsulosin 0.4 mg capsule 0.4 mg PO QHS urination 10/31/13 [History Last Taken 08/16/21]
pramipexole 0.25 mg tablet 0.25 mg PO QHS restless legs 11/21/16 [History Last Taken 08/16/21]
albuterol sulfate 2.5 mg/3 mL (0.083 %) solution for nebulization 2.5 mg inhalation Q4H PRN PRN copd 05/26/19 [History Last Taken 08/14/21]
albuterol sulfate 90 mcg/actuation aerosol inhaler (ProAir HFA) 2 puff PO Q6H PRN Shortness Of Breath 05/26/19 [History Last Taken Unknown]
pantoprazole 40 mg tablet,delayed release 40 mg PO DAILY gerd 05/26/19 [History Last Taken 08/16/21]
roflumilast 250 mcg tablet (Daliresp) 250 mcg PO BID copd 05/26/19 [History Last Taken 08/16/21]
sertraline 100 mg tablet (Zoloft) 150 mg PO QHS depression 05/26/19 [History Last Taken 08/16/21]
ergocalciferol (vitamin D2) 1,250 mcg (50,000 unit) capsule 50,000 unit PO [facility] 12/08/19 [History Last Taken 08/13/21]
fluticasone fur. 100 mcg-umeclid 62.5 mcg-vilant 25 mcg inhalat.powder (Trelegy Ellipta) 1 inh inhalation DAILY COPD 08/18/21 [History Last Taken 08/16/21]
atorvastatin 10 mg tablet 10 mg PO QHS 08/12/22 [History Last Taken Unknown]
furosemide 40 mg tablet (Lasix) 40 mg PO DAILY 08/12/22 [History Last Taken Unknown]
psyllium 1 packet PO DAILY 08/12/22 [History Last Taken Unknown]
acetazolamide 250 mg tablet 250 mg PO BID 10/10/23 [History Last Taken Unknown]
apixaban 5 mg tablet (Eliquis) 5 mg PO BID 10/10/23 [History Last Taken Unknown]
clotrimazole-betamethasone 1 %-0.05 % topical cream 1 applic topical BID 10/10/23 [History Last Taken Unknown]
metoprolol tartrate 50 mg tablet 50 mg PO Q12H 10/10/23 [History Last Taken Unknown]
ferrous sulfate 325 mg (65 mg iron) tablet (FeroSul) 325 mg PO BID 12/01/23 [History Last Taken Unknown]
acetaminophen 325 mg tablet 650 mg (2 x 325 mg) PO Q6H PRN PRN Pain 1-10 Or Fever >100.7 #0 tabs 12/05/23 [Rx Last Taken Unknown]
azithromycin 500 mg tablet (Zithromax) 500 mg PO DAILY 3 days #3 tabs 12/05/23 [Rx Last Taken Unknown]
food supplemt, lactose-reduced 0.08 gram-1.5 kcal/mL oral liquid (Ensure Plus High Protein) 120 ml PO TIDCM #0 mL 12/05/23 [Rx Last Taken Unknown]
guaifenesin 1,200 mg tablet, extended release 12 hr (Mucus Relief ER) 1,200 mg PO BID #0 tabs 12/05/23 [Rx Last Taken Unknown]
ipratropium 0.5 mg-albuterol 3 mg (2.5 mg base)/3 mL nebulization soln 3 ml inhalation Q4H.RT #0 mL 12/05/23 [Rx Last Taken Unknown]
melatonin 3 mg tablet 3 mg PO QHS PRN PRN Insomnia #0 tabs 12/05/23 [Rx Last Taken Unknown]
sennosides 8.6 mg-docusate sodium 50 mg tablet (Stool Softener-Stimulant Laxative) 2 tab PO BID PRN Constipation #0 tabs 12/05/23 [Rx Last Taken Unknown]
prednisone 20 mg tablet 40 mg PO DAILY 03/15/24 [History Last Taken Unknown]




Allergy/AdvReac Type Severity Reaction Status Date / Time
trimethoprim [From Bactrim] Allergy Intermediate hot and Verified 03/15/24 02:06
   diaphoretic  
Sulfa (Sulfonamide Allergy  Rash Verified 03/15/24 02:06
Antibiotics)     


Family History (Reviewed 12/01/23 @ 09:03 by Dr. Eric Parra MD)
Mother
 Diabetes
 Hypertension
 Cancer


Surgical History (Reviewed 12/01/23 @ 09:03 by Dr. Eric Parra MD)

History of incisional hernia repair
S/P colectomy
S/P hemorrhoidectomy


Social History (Reviewed 12/01/23 @ 09:03 by Dr. Eric Parra MD)
Smoking Status:  Former smoker 



ROS
ROS ED
Constitutional
Constitutional ED: Denies chills or fever(s)
ENT
ENT ED: Reports rhinorrhea and sore throat
Cardiovascular
Cardiovascular: Denies chest pain
Respiratory/Chest
Respiratory/Chest: Reports cough and dyspnea
Gastrointestinal
Gastrointestinal: Denies abdominal pain, diarrhea, nausea or vomiting
Genitourinary
Genitourinary ED: Denies dysuria
Musculoskeletal
Musculoskeletal: Reports myalgias
Integumentary
Denies rash
Neurologic
Neurologic: Denies headache(s)
Hematologic/Lymphatic
Hematologic/Lymphatic: Denies easy bleeding or easy bruising

EXAM
Physical Exam
Const
Vital Signs: 



 03/15/24
02:05 03/15/24
02:05 03/15/24
03:04
Temperature 99 F  
Temperature Source Temporal  
Pulse Rate 90  93
Respiratory Rate 22 H  30 H
Respiratory Effort  Short of Breath 
Respiratory Depth  Shallow 
Respiratory Pattern  Tachypnea Tachypnea
Blood Pressure 132/68 H  
Blood Pressure Mean 89  
Pulse Ox 93  
Oxygen Delivery Method Nasal Cannula Nasal Cannula 
Oxygen Flow Rate (L/min) 3 3 
Fraction of Inspired Oxygen (FIO2)   

 03/15/24
04:05 03/15/24
04:43 03/15/24
04:44
Temperature   
Temperature Source   
Pulse Rate 98 93 96
Respiratory Rate 27 H 25 H 19 H
Respiratory Effort   
Respiratory Depth   
Respiratory Pattern   
Blood Pressure 136/56 H 136/75 H 141/90 H
Blood Pressure Mean 82 95 107
Pulse Ox 88 95 93
Oxygen Delivery Method Nasal Cannula Bi-pap Bi-pap
Oxygen Flow Rate (L/min) 4  
Fraction of Inspired Oxygen (FIO2)   

 03/15/24
02:30 03/15/24
02:45 03/15/24
03:00
Temperature   
Temperature Source   
Pulse Rate 92 96 92
Respiratory Rate 37 H 27 H 23 H
Respiratory Effort   
Respiratory Depth   
Respiratory Pattern   
Blood Pressure 136/70 H 145/66 H 129/59 H
Blood Pressure Mean 89 88 79
Pulse Ox   
Oxygen Delivery Method   
Oxygen Flow Rate (L/min)   
Fraction of Inspired Oxygen (FIO2)   

 03/15/24
03:16 03/15/24
03:30 03/15/24
03:45
Temperature   
Temperature Source   
Pulse Rate 92 94 
Respiratory Rate 37 H 22 H 
Respiratory Effort   
Respiratory Depth   
Respiratory Pattern   
Blood Pressure 135/60 H 151/67 H 126/70 H
Blood Pressure Mean 78 87 87
Pulse Ox 94  
Oxygen Delivery Method   
Oxygen Flow Rate (L/min)   
Fraction of Inspired Oxygen (FIO2)   

 03/15/24
04:00 03/15/24
04:15 03/15/24
04:30
Temperature   
Temperature Source   
Pulse Rate 95 95 95
Respiratory Rate 31 H 26 H 29 H
Respiratory Effort   
Respiratory Depth   
Respiratory Pattern   
Blood Pressure 139/56 H 135/67 H 136/75 H
Blood Pressure Mean 80 86 90
Pulse Ox 83  91
Oxygen Delivery Method Nasal Cannula  Bi-pap
Oxygen Flow Rate (L/min) 6  
Fraction of Inspired Oxygen (FIO2)   

 03/15/24
04:43 03/15/24
04:55
Temperature  98.2 F
Temperature Source  Temporal
Pulse Rate 92 92
Respiratory Rate 28 H 22 H
Respiratory Effort  
Respiratory Depth  
Respiratory Pattern Tachypnea 
Blood Pressure  141/90 H
Blood Pressure Mean  107
Pulse Ox 95 93
Oxygen Delivery Method  Bi-pap
Oxygen Flow Rate (L/min)  
Fraction of Inspired Oxygen (FIO2) 50 



Positive well nourished, well developed and obese
General Appearance ED: well developed; Negative for pallor
Nutritional Appearance: obese
HEENT
HEENT Narrative: 
Patient has diffuse erythema and edema in the posterior pharynx greatest on the right with loss of tonsillar crypts concerning for abscess

Patient does have mild difficulty with his secretions

No exudates or hard palate petechiae noted
Eyes
PERRL and EOMs intact bilaterally
General Eye ED: Negative for scleral icterus
Neck
supple and no JVD
Neck Narrative: 
No nuchal rigidity or meningeal signs present
Chest Wall
palpation of chest normal
Resp
Resp Narrative: 
Patient is tachypneic with accessory muscle use and breath sounds are diminished throughout with diffuse inspiratory and expiratory wheezing
Cardio
regular rate and regular rhythm
GI
normal to inspection, nondistended, normoactive bowel sounds, non-tender, non-distended and no masses
GI Narrative: 
No voluntary guarding or rigidity or pulsatile mass
Auscultation: normoactive bowel sounds
Palpation: soft
Extremity
normal to inspection
Extremity Narrative: 
No asymmetric edema negative Homans' sign bilaterally
Neuro
oriented x3 and CN's II-XII intact bilaterally
Sensorium / Orientation: alert
Psych
mental status grossly normal
Skin
no rashes or lesions noted
General Skin Exam: Negative for jaundice or pallor

MDM
MDM
MDM Narrative
Medical decision making narrative: 
Patient presented to the ER afebrile but had increased work of breathing and despite being on his normal 3 L was desatting down into the mid 80s.  Differential diagnosis is for COPD exacerbation versus pneumonia versus pneumothorax and based on his 
sore throat and soft tissue swelling there is concern for peritonsillar abscess versus epiglottitis.  Secondary to these findings patient blood work was obtained as well as COVID and flu and RSV swabs.  Patient was then sent for chest x-ray and a CT 
of the soft tissue neck.  Chest x-ray revealed no obvious pneumonia or signs of aspiration and CT of the neck showed diffuse soft tissue sign without obvious drainable fluid collection.  Patient was RSV positive however which does correlate with his 
increased shortness of breath and increased wheezing on exam.  At this time as the patient is continue to have increased work of breathing and desatting into the 80s despite his normal home oxygen he was transitioned to BiPAP.  The patient was 
started on Zosyn secondary to concern for infection.  At this time as the patient is now requiring BiPAP instead of normal nasal cannula oxygen and is at risk for airway compromise based on the diffuse pharyngeal edema medicine was contacted and 
they do agree to accept the patient for further treatment at this time.  The patient states that he is a DNR Comfort Care arrest no intubation and therefore there is no need to emergently consult ENT as there is no obvious drainable fluid collection 
and patient is stating he would not want intubated even if needed
History & Record Review
Discussion w/independent historian: Patient
Lab Data
Attestation: I reviewed the patient's lab results.
Labs: 

Laboratory Results - last 24 hr

  03/15/24 03/15/24
  03:11 05:24
WBC  19.0 H 
RBC  4.11 L 
Hgb  11.9 L 
Hct  41.0 
MCV  99.8 H 
MCH  29.0 
MCHC  29.0 L 
RDW Std Deviation  46.6 H 
RDW Coeff of Regi  12.8 
Plt Count  194 
MPV  10.9 
Immature Gran % (Auto)  0.500 
Neut % (Auto)  80.5 H 
Lymph % (Auto)  9.4 L 
Mono % (Auto)  9.3 
Eos % (Auto)  0.1 
Baso % (Auto)  0.2 
Absolute Neuts (auto)  15.3 H 
Absolute Lymphs (auto)  1.79 
Nucleated RBC %  0 
Differential Comment  SCANNED 
Diff Path Review  May foll 
Sodium  141 
Potassium  5.3 H 
Chloride  105 
Carbon Dioxide  33.0 H 
Anion Gap  3 L 
BUN  19 H 
Creatinine  0.78 
Estim Creat Clear Calc  97.47 
Est GFR (MDRD) Af Amer  124 
Est GFR (MDRD) Non-Af  103 
BUN/Creatinine Ratio  24.4 H 
Glucose  120 H 
Lactic Acid   0.5
Calcium  8.9 
Magnesium  1.8 
B-Natriuretic Peptide  140.1 H 
Procalcitonin  0.07 



ABG Data
ABG results: 

ABG

  03/15/24
  04:25
Specimen Type  ART
Sample Site  L Radial
pH  7.25 L
Bicarbonate Actual  36.7 H
Total CO2  39
Base Excess  9 H
O2 Saturation  78 L
O2 %  4.0
ABG pCO2  84.5 H*
ABG pO2  52 L
Tra Test  Positive
O2 Delivery Device  Cannula
Vent Mode  Not entered
Crit Call To/Read Back  Yes
Blood Gas Notified Whom  Andes
Blood Gas Notified Time  04:26:53



Radiography
Diagnostic Testing: 

Clinical Impression(s) from Imaging Studies

Soft Tissue Neck CT  03/15/24 02:52
IMPRESSION:
 
1.  Marked enlargement of the palatine tonsils and prominent soft tissue
swelling of the hypopharyngeal structures predominantly on the right with
some mass effect on the hypopharyngeal airway. This may all be due to
infection. A hypopharyngeal mass cannot be completely excluded. Recommend
follow-up.
 
2.  No cervical lymphadenopathy.
 
N.B. : The above Results were Read Back by Chris De Jesus MD to Stan Ortiz DO, and understanding confirmed on 03/15/2024 04:51:25 (ET).
 
Electronically Signed:
Chris De Jesus MD
2024/03/15 at 4:54 EDT
Reading Location ID and State: [id number] / [place]
Tel [phone], Service support  [phone], Fax [phone]
 

ADDENDUM: 03/15/24 0501
IMPRESSION:
 
1.  Marked enlargement of the palatine tonsils and prominent soft tissue
swelling of the hypopharyngeal structures predominantly on the right with
some mass effect on the hypopharyngeal airway. This may all be due to
infection. A hypopharyngeal mass cannot be completely excluded. Recommend
follow-up.
 
2.  No cervical lymphadenopathy.
 
N.B. : The above Results were Read Back by Chris De Jesus MD to Stan Ortiz DO, and understanding confirmed on 03/15/2024 04:51:25 (ET).
 
Electronically Signed:
Chris De Jesus MD
2024/03/15 at 4:54 EDT
Reading Location ID and State: [id number] / [place]
Tel [phone], Service support  [phone], Fax [phone]
 


Chest X-Ray  03/15/24 03:45
IMPRESSION:
 
1.  Coarse interstitial disease in the lung bases bilaterally likely due to
pulmonary fibrosis unchanged since previous exam.
 
2.  Borderline cardiomegaly.
 
Electronically Signed:
Chris De Jesus MD
2024/03/15 at 4:41 EDT
Reading Location ID and State: [id number] / [place]
Tel [phone], Service support  [phone], Fax [phone]
 



Chest x-ray as interpreted by the emergency medicine physician reveals coarse interstitial lung changes consistent with pulmonary fibrosis without obvious pneumothorax or infiltrate
Management
Discussion w/another healthcare provider: Hospitalist

Critical Care Time
Critical Care Time: Yes
Critical care time (excluding procedures): Discussing w/Patient &/or Family/Care Giver, Discussing w/Consultants and - (Critical care time of 33 minutes)

Discharge Plan
Dx/Rx/DC Orders
Clinical Impression:
 Acute on chronic respiratory failure with hypoxia, COPD (chronic obstructive pulmonary disease), Hypertension, Respiratory syncytial virus (RSV) infection, Pharyngeal edema


Disposition
***Disposition***: Acute Care \[facility]\""

## 2024-03-15 NOTE — PCM.HP.STD
[facility] - General
General
Date of Admission: 03/15/24
Date of Service: 03/15/24
Chief Complaint: SOB.
[facility] Narrative
JORGE MAYNARD, is a 78 M with a past medical history of essential hypertension, hyperlipidemia, obesity; with BMI of 34.7 this admission, history of PAF; on Eliquis, chronic diastolic CHF; with preserved LVEF, history of SBO, RLS, BPH, depression 
with anxiety, GERD, OA, history of tobacco abuse; with subsequent COPD and recently diagnosed Strep throat in the setting of DNR-CCA Code Status; with no intubation who presents to [facility] ER complaining of SOB.  Mr. Maynard reports 
his symptoms began approximately one day prior to admission with ORDONEZ that progressed to SOB at rest.  He also admits to wheezing with a nonproductive cough.  He denies associated fever, chills, nausea or vomiting.  In the ER he was diagnosed with AE 
COPD; with acute hypoxic and hypercapnic respiratory failure requiring BiPAP complicated by recently diagnosed severe streptococcal pharyngitis with markedly enlarged palatine tonsils and possible mass effect on the hypopharyngeal airway and he was 
then admitted to the ICU for ongoing care for a stay that is expected to be greater than 48 hours. 


[facility]
Medical History (Updated 03/15/24 @ 05:40 by Dr. Gómez Tesfaye, )

Acute respiratory failure, unspecified whether with hypoxia or hypercapnia
Adult failure to thrive
Anxiety
Asthma
Back pain
Chronic obstructive lung disease
Colon cancer
Confusion
COPD (chronic obstructive pulmonary disease)
COPD (chronic obstructive pulmonary disease)
Dementia
Depression
Dysphagia, oropharyngeal phase
Emphysema, interstitial
Former smoker
Gastroesophageal reflux disease
History of colon cancer
History of colonic diverticulitis
HTN (hypertension)
Hyperlipidemia
Hypertension
Insomnia, unspecified
Obstructive sleep apnea (adult) (pediatric)
Osteoarthritis
Paroxysmal atrial fibrillation
Primary osteoarthritis, right shoulder
Right shoulder pain


Home Medications

tamsulosin 0.4 mg capsule 0.4 mg PO QHS urination 10/31/13 [History Last Taken 08/16/21]
pramipexole 0.25 mg tablet 0.25 mg PO QHS restless legs 11/21/16 [History Last Taken 08/16/21]
albuterol sulfate 2.5 mg/3 mL (0.083 %) solution for nebulization 2.5 mg inhalation Q4H PRN PRN copd 05/26/19 [History Last Taken 08/14/21]
albuterol sulfate 90 mcg/actuation aerosol inhaler (ProAir HFA) 2 puff PO Q6H PRN Shortness Of Breath 05/26/19 [History Last Taken Unknown]
pantoprazole 40 mg tablet,delayed release 40 mg PO DAILY gerd 05/26/19 [History Last Taken 08/16/21]
roflumilast 250 mcg tablet (Daliresp) 250 mcg PO BID copd 05/26/19 [History Last Taken 08/16/21]
sertraline 100 mg tablet (Zoloft) 150 mg PO QHS depression 05/26/19 [History Last Taken 08/16/21]
ergocalciferol (vitamin D2) 1,250 mcg (50,000 unit) capsule 50,000 unit PO [facility] 12/08/19 [History Last Taken 08/13/21]
fluticasone fur. 100 mcg-umeclid 62.5 mcg-vilant 25 mcg inhalat.powder (Trelegy Ellipta) 1 inh inhalation DAILY COPD 08/18/21 [History Last Taken 08/16/21]
atorvastatin 10 mg tablet 10 mg PO QHS 08/12/22 [History Last Taken Unknown]
furosemide 40 mg tablet (Lasix) 40 mg PO DAILY 08/12/22 [History Last Taken Unknown]
psyllium 1 packet PO DAILY 08/12/22 [History Last Taken Unknown]
acetazolamide 250 mg tablet 250 mg PO BID 10/10/23 [History Last Taken Unknown]
apixaban 5 mg tablet (Eliquis) 5 mg PO BID 10/10/23 [History Last Taken Unknown]
clotrimazole-betamethasone 1 %-0.05 % topical cream 1 applic topical BID 10/10/23 [History Last Taken Unknown]
metoprolol tartrate 50 mg tablet 50 mg PO Q12H 10/10/23 [History Last Taken Unknown]
ferrous sulfate 325 mg (65 mg iron) tablet (FeroSul) 325 mg PO BID 12/01/23 [History Last Taken Unknown]
acetaminophen 325 mg tablet 650 mg (2 x 325 mg) PO Q6H PRN PRN Pain 1-10 Or Fever >100.7 #0 tabs 12/05/23 [Rx Last Taken Unknown]
azithromycin 500 mg tablet (Zithromax) 500 mg PO DAILY 3 days #3 tabs 12/05/23 [Rx Last Taken Unknown]
food supplemt, lactose-reduced 0.08 gram-1.5 kcal/mL oral liquid (Ensure Plus High Protein) 120 ml PO TIDCM #0 mL 12/05/23 [Rx Last Taken Unknown]
guaifenesin 1,200 mg tablet, extended release 12 hr (Mucus Relief ER) 1,200 mg PO BID #0 tabs 12/05/23 [Rx Last Taken Unknown]
ipratropium 0.5 mg-albuterol 3 mg (2.5 mg base)/3 mL nebulization soln 3 ml inhalation Q4H.RT #0 mL 12/05/23 [Rx Last Taken Unknown]
melatonin 3 mg tablet 3 mg PO QHS PRN PRN Insomnia #0 tabs 12/05/23 [Rx Last Taken Unknown]
sennosides 8.6 mg-docusate sodium 50 mg tablet (Stool Softener-Stimulant Laxative) 2 tab PO BID PRN Constipation #0 tabs 12/05/23 [Rx Last Taken Unknown]
prednisone 20 mg tablet 40 mg PO DAILY 03/15/24 [History Last Taken Unknown]




Allergy/AdvReac Type Severity Reaction Status Date / Time
trimethoprim [From Bactrim] Allergy Intermediate hot and Verified 03/15/24 02:06
   diaphoretic  
Sulfa (Sulfonamide Allergy  Rash Verified 03/15/24 02:06
Antibiotics)     


Family History (Reviewed 12/01/23 @ 09:03 by Dr. Eric Parra MD)
Mother
 Diabetes
 Hypertension
 Cancer


Surgical History (Reviewed 12/01/23 @ 09:03 by Dr. Eric Parra MD)

History of incisional hernia repair
S/P colectomy
S/P hemorrhoidectomy


Social History (Reviewed 12/01/23 @ 09:03 by Dr. Eric Parra MD)
Smoking Status:  Former smoker 



Vital Signs
Vital Signs
Vital Signs: 


 03/15/24
02:05 03/15/24
02:05 03/15/24
03:04
Temperature 99 F  
Temperature Source Temporal  
Pulse Rate 90  93
Respiratory Rate 22 H  30 H
Respiratory Effort  Short of Breath 
Respiratory Depth  Shallow 
Respiratory Pattern  Tachypnea Tachypnea
Blood Pressure 132/68 H  
Blood Pressure Mean 89  
Pulse Ox 93  
Oxygen Delivery Method Nasal Cannula Nasal Cannula 
Oxygen Flow Rate (L/min) 3 3 
Fraction of Inspired Oxygen (FIO2)   

 03/15/24
04:05 03/15/24
04:43 03/15/24
04:44
Temperature   
Temperature Source   
Pulse Rate 98 93 96
Respiratory Rate 27 H 25 H 19 H
Respiratory Effort   
Respiratory Depth   
Respiratory Pattern   
Blood Pressure 136/56 H 136/75 H 141/90 H
Blood Pressure Mean 82 95 107
Pulse Ox 88 95 93
Oxygen Delivery Method Nasal Cannula Bi-pap Bi-pap
Oxygen Flow Rate (L/min) 4  
Fraction of Inspired Oxygen (FIO2)   

 03/15/24
02:30 03/15/24
02:45 03/15/24
03:00
Temperature   
Temperature Source   
Pulse Rate 92 96 92
Respiratory Rate 37 H 27 H 23 H
Respiratory Effort   
Respiratory Depth   
Respiratory Pattern   
Blood Pressure 136/70 H 145/66 H 129/59 H
Blood Pressure Mean 89 88 79
Pulse Ox   
Oxygen Delivery Method   
Oxygen Flow Rate (L/min)   
Fraction of Inspired Oxygen (FIO2)   

 03/15/24
03:16 03/15/24
03:30 03/15/24
03:45
Temperature   
Temperature Source   
Pulse Rate 92 94 
Respiratory Rate 37 H 22 H 
Respiratory Effort   
Respiratory Depth   
Respiratory Pattern   
Blood Pressure 135/60 H 151/67 H 126/70 H
Blood Pressure Mean 78 87 87
Pulse Ox 94  
Oxygen Delivery Method   
Oxygen Flow Rate (L/min)   
Fraction of Inspired Oxygen (FIO2)   

 03/15/24
04:00 03/15/24
04:15 03/15/24
04:30
Temperature   
Temperature Source   
Pulse Rate 95 95 95
Respiratory Rate 31 H 26 H 29 H
Respiratory Effort   
Respiratory Depth   
Respiratory Pattern   
Blood Pressure 139/56 H 135/67 H 136/75 H
Blood Pressure Mean 80 86 90
Pulse Ox 83  91
Oxygen Delivery Method Nasal Cannula  Bi-pap
Oxygen Flow Rate (L/min) 6  
Fraction of Inspired Oxygen (FIO2)   

 03/15/24
04:43 03/15/24
04:55
Temperature  98.2 F
Temperature Source  Temporal
Pulse Rate 92 92
Respiratory Rate 28 H 22 H
Respiratory Effort  
Respiratory Depth  
Respiratory Pattern Tachypnea 
Blood Pressure  141/90 H
Blood Pressure Mean  107
Pulse Ox 95 93
Oxygen Delivery Method  Bi-pap
Oxygen Flow Rate (L/min)  
Fraction of Inspired Oxygen (FIO2) 50 

Weight

Weight:                        242 lb 1.081 oz                                   
Body Mass Index (BMI)          34.7                                              




Results
Lab / Micro Data

03/15/24 03:11          

03/15/24 03:11          

Labs: 
Laboratory Results - last 24 hr

03/15/24 03:11: WBC 19.0 H, RBC 4.11 L, Hgb 11.9 L, Hct 41.0, MCV 99.8 H, MCH 29.0, MCHC 29.0 L, RDW Std Deviation 46.6 H, RDW Coeff of Regi 12.8, Plt Count 194, MPV 10.9, Immature Gran % (Auto) 0.500, Neut % (Auto) 80.5 H, Lymph % (Auto) 9.4 L, Mono 
% (Auto) 9.3, Eos % (Auto) 0.1, Baso % (Auto) 0.2, Absolute Neuts (auto) 15.3 H, Absolute Lymphs (auto) 1.79, Nucleated RBC % 0, Differential Comment SCANNED, Diff Path Review May foll, Sodium 141, Potassium 5.3 H, Chloride 105, Carbon Dioxide 33.0 
H, Anion Gap 3 L, BUN 19 H, Creatinine 0.78, Estim Creat Clear Calc 97.47, Est GFR (MDRD) Af Amer 124, Est GFR (MDRD) Non-Af 103, BUN/Creatinine Ratio 24.4 H, Glucose 120 H, Calcium 8.9, Magnesium 1.8, B-Natriuretic Peptide 140.1 H, Procalcitonin 
0.07


Micro: 
Microbiology

03/15/24 02:18   Mucosa - Nose   SARS-CoV-2, Influenza & RSV (PCR) - Final
                            RSV
03/15/24 02:18   Mucosa - Throat   Streptococcus pyogenes (PCR) - Final


ABG Data
ABG results: 
ABG

  03/15/24
  04:25
Specimen Type  ART
Sample Site  L Radial
pH  7.25 L
Bicarbonate Actual  36.7 H
Total CO2  39
Base Excess  9 H
O2 Saturation  78 L
O2 %  4.0
ABG pCO2  84.5 H*
ABG pO2  52 L
Tra Test  Positive
O2 Delivery Device  Cannula
Vent Mode  Not entered
Crit Call To/Read Back  Yes
Blood Gas Notified Enrique Ortiz
Blood Gas Notified Time  04:26:53


Imaging
Radiology Impression

Soft Tissue Neck CT  03/15/24 02:52
IMPRESSION:
 
1.  Marked enlargement of the palatine tonsils and prominent soft tissue
swelling of the hypopharyngeal structures predominantly on the right with
some mass effect on the hypopharyngeal airway. This may all be due to
infection. A hypopharyngeal mass cannot be completely excluded. Recommend
follow-up.
 
2.  No cervical lymphadenopathy.
 
N.B. : The above Results were Read Back by Chris De Jesus MD to Stan Ortiz DO, and understanding confirmed on 03/15/2024 04:51:25 (ET).
 
Electronically Signed:
Chris De Jesus MD
2024/03/15 at 4:54 EDT
Reading Location ID and State: [id number] / [place]
Tel [phone], Service support  [phone], Fax [phone]
 

ADDENDUM: 03/15/24 0501
IMPRESSION:
 
1.  Marked enlargement of the palatine tonsils and prominent soft tissue
swelling of the hypopharyngeal structures predominantly on the right with
some mass effect on the hypopharyngeal airway. This may all be due to
infection. A hypopharyngeal mass cannot be completely excluded. Recommend
follow-up.
 
2.  No cervical lymphadenopathy.
 
N.B. : The above Results were Read Back by Chris De Jesus MD to Stan Ortiz DO, and understanding confirmed on 03/15/2024 04:51:25 (ET).
 
Electronically Signed:
Chris De Jesus MD
2024/03/15 at 4:54 EDT
Reading Location ID and State: [id number] / [place]
Tel [phone], Service support  [phone], Fax [phone]
 


Chest X-Ray  03/15/24 03:45
IMPRESSION:
 
1.  Coarse interstitial disease in the lung bases bilaterally likely due to
pulmonary fibrosis unchanged since previous exam.
 
2.  Borderline cardiomegaly.
 
Electronically Signed:
Chris De Jesus MD
2024/03/15 at 4:41 EDT
Reading Location ID and State: [id number] / [place]
Tel [phone], Service support  [phone], Fax [phone]
 




Assessment & Plan
Assessment/Plan
(1) COPD exacerbation: 
(2) Acute hypoxic on chronic hypercapnic respiratory failure: 
PLAN: 
Plan
1.  AE COPD; with acute hypoxic and hypercapnic respiratory failure requiring BiPAP in patient with known DNR-CCA Code Status; without intubation - Admit to ICU.  Continue antibiotics, steroids, nebulizers and BiPAP begun in the ER.  Serialize ABG 
to follow trend.  

2.  Recently diagnosed severe streptococcal pharyngitis with markedly enlarged palatine tonsils and possible mass effect on the hypopharyngeal airway complicating #1 - Continue antibiotics and steroids used to treat #1 plus consult ENT on-call for 
abnormal CT scan with help appreciated in advance.  

3.  Obesity; with BMI of 34.7 present on admission - Weight loss will be recommended.  

4.  History of PAF; on Eliquis - Resume Eliquis and Metoprolol as previous after ENT issue has resolved.

5.  Essential hypertension - Continue current treatment plus give prn IV Hydralazine for systolic blood pressure > 160 mm Hg.

6.  Hyperlipidemia - Resume statin and check Lipid Profile. 

7.  Chronic diastolic CHF; with preserved LVEF - Stable with no evidence of flare.  

8.  History of SBO - Noted. 

9.  RLS - Resume Pramipexole as previous. 

10.  BPH - Continue Tamsulosin.

11.  Depression with anxiety - Resume home regimen. 

12.  GERD - Continue PPI IV.

13.  OA - Stable.  Give Tylenol prn.

14.  DVT prophylaxis - Place SCD's and hold Eliquis in case ENT procedure is needed.  


Total time:  Approximately 55 minutes. 

Charges/Coding
Visit Charges
Inpatient E&M: 90331 Init Hosp L2

## 2024-03-15 NOTE — EX.PCM.CONCC
Assessment & Plan
Assessment/Plan
(1) Acute hypoxic on chronic hypercapnic respiratory failure: 
(2) Acute on chronic respiratory failure with hypoxia: 
(3) Respiratory syncytial virus (RSV) infection: 
(4) Pharyngeal edema: 
PLAN: 
Plan

RECOMMENDATIONS:

 1.  Continue empiric antibiotics, bronchodilators and steroids
 2.  Await ENT recommendations
 3.  Continue BiPAP around-the-clock
 4.  Confirm CODE STATUS is DNR/DNI
 5.  Attempt to volume status even.  DC IV fluids

IMPRESSIONS:

 1.  Acute on chronic combined respiratory failure secondary to RSV in the setting of COPD
  Patient appears to have relatively advanced COPD by clinical criteria.  PFTs are not available for review.  Patient appears to be oxygenating okay, but has significant respiratory acidosis.  Patient currently on AVAPS as CODE STATUS is DNR/DNI 
from the nursing home.  Unfortunately, patient has tested positive for RSV and there is little pharmacologic intervention outside of steroids that can be used.  May need to reevaluate CODE STATUS if patient decompensates, but will continue with 
BiPAP for now for permissive hypercapnia.

 2.  Pharyngeal mass
  Patient was significant swelling and deviation from a pharyngeal mass on CT scan.  Unclear if patient has a peritonsillar abscess, but I cannot see any fluid pocket.  ENT to evaluate.  Procalcitonin is within normal limits suggesting that RSV may 
be the predominant factor in patient's respiratory decline.  Patient does not have any stridor, but this is difficult to assess given patient's need for BiPAP therapy.

 3.  Chronic HFpEF/history of PAF
  Heart rate appears to be well-controlled at this time.  Patient does not appear to be significantly volume overloaded clinically.  Will attempt to keep patient even on I's and O's for now.  Likely not necessary to obtain an echocardiogram at this 
time as I do not believe this is his predominant etiology.

 4.  Hypertension/hyperlipidemia/history of SBO/RLS/BPH/GERD/depression/advanced age
  Complicates care, management, recovery and prognosis.  Patient taken off of Eliquis secondary to concerns for need for intervention.  Once patient is off of BiPAP, home medications can be reinitiated.  As needed BP meds have been ordered.  
Patient's blood pressures are adequate at this time.  Patient's CODE STATUS from nursing home was a DNR/DNI.  Will need to discuss with family on if this is still wishes.  May need to reevaluate for possible hospice if patient's respiratory status 
continues to decline.

TIME:

 45 minutes critical care time spent addressing patient's respiratory failure, pharyngeal mass, CHF, review of all data and collaboration with care team


HPI
Consult Data
Date of Consult: 03/15/24
HPI Narrative
HPI Narrative: 
JORGE MAYNARD is a 78 M, with past medical history listed below, who presents to [facility] on 3/15/2024 secondary to progressive chest congestion and cough.  Patient does carry diagnosis of COPD and is on 3 L nasal cannula at baseline.  
Patient does state a nursing home at baseline.  Patient reportedly was recently diagnosed with strep pharyngitis and was treated with azithromycin.

In the ER, patient was afebrile, but tachypneic as high as 30 breaths/min.  Patient was originally on his baseline 3 L nasal cannula but had to be transition to BiPAP secondary to respiratory distress.  Laboratory data showed a white blood cell 
count of 19, hemoglobin of 11.9 and platelets of 194.  Patient had a potassium of 5.3, bicarb of 33 and a glucose of 120.  BNP was elevated at 140.  Patient had an ABG on his baseline nasal cannula showing partially compensated acute on chronic 
respiratory acidosis with increased AA gradient (7.2 5/85/52/78%).  Soft tissue CT scan of the neck showed marked enlargement of the [place] tonsils with prominent tissue on the right with some mass effect.  Chest x-ray showed bilateral coarse 
infiltrates.  Patient admitted to the intensive care unit for further evaluation.

On presentation to the intensive care unit, patient was compliant with BiPAP with good synchrony.  Patient not able to answer questions.  Patient was subsequently found to have RSV.  No family at the bedside to provide additional information.  
Unable to obtain review of systems secondary to mental status.


[facility]
Medical History (Reviewed 03/15/24 @ 10:38 by Dr. Demetrius Huston MD)

Acute respiratory failure, unspecified whether with hypoxia or hypercapnia
Adult failure to thrive
Anxiety
Asthma
Back pain
Chronic obstructive lung disease
Colon cancer
Confusion
COPD (chronic obstructive pulmonary disease)
COPD (chronic obstructive pulmonary disease)
Dementia
Depression
Dysphagia, oropharyngeal phase
Emphysema, interstitial
Former smoker
Gastroesophageal reflux disease
History of colon cancer
History of colonic diverticulitis
HTN (hypertension)
Hyperlipidemia
Hypertension
Insomnia, unspecified
Obstructive sleep apnea (adult) (pediatric)
Osteoarthritis
Paroxysmal atrial fibrillation
Primary osteoarthritis, right shoulder
Right shoulder pain


Home Medications

tamsulosin 0.4 mg capsule 0.4 mg PO QHS urination 10/31/13 [History Last Taken 08/16/21]
pramipexole 0.25 mg tablet 0.25 mg PO QHS restless legs 11/21/16 [History Last Taken 08/16/21]
albuterol sulfate 2.5 mg/3 mL (0.083 %) solution for nebulization 2.5 mg inhalation Q4H PRN PRN copd 05/26/19 [History Last Taken 08/14/21]
albuterol sulfate 90 mcg/actuation aerosol inhaler (ProAir HFA) 2 puff PO Q6H PRN Shortness Of Breath 05/26/19 [History Last Taken Unknown]
pantoprazole 40 mg tablet,delayed release 40 mg PO DAILY gerd 05/26/19 [History Last Taken 08/16/21]
roflumilast 250 mcg tablet (Daliresp) 250 mcg PO BID copd 05/26/19 [History Last Taken 08/16/21]
sertraline 100 mg tablet (Zoloft) 150 mg PO QHS depression 05/26/19 [History Last Taken 08/16/21]
ergocalciferol (vitamin D2) 1,250 mcg (50,000 unit) capsule 50,000 unit PO [facility] 12/08/19 [History Last Taken 08/13/21]
fluticasone fur. 100 mcg-umeclid 62.5 mcg-vilant 25 mcg inhalat.powder (Trelegy Ellipta) 1 inh inhalation DAILY COPD 08/18/21 [History Last Taken 08/16/21]
atorvastatin 10 mg tablet 10 mg PO QHS 08/12/22 [History Last Taken Unknown]
furosemide 40 mg tablet (Lasix) 40 mg PO DAILY 08/12/22 [History Last Taken Unknown]
psyllium 1 packet PO DAILY 08/12/22 [History Last Taken Unknown]
acetazolamide 250 mg tablet 250 mg PO BID 10/10/23 [History Last Taken Unknown]
apixaban 5 mg tablet (Eliquis) 5 mg PO BID 10/10/23 [History Last Taken Unknown]
clotrimazole-betamethasone 1 %-0.05 % topical cream 1 applic topical BID 10/10/23 [History Last Taken Unknown]
metoprolol tartrate 50 mg tablet 50 mg PO Q12H 10/10/23 [History Last Taken Unknown]
ferrous sulfate 325 mg (65 mg iron) tablet (FeroSul) 325 mg PO BID 12/01/23 [History Last Taken Unknown]
acetaminophen 325 mg tablet 650 mg (2 x 325 mg) PO Q6H PRN PRN Pain 1-10 Or Fever >100.7 #0 tabs 12/05/23 [Rx Last Taken Unknown]
azithromycin 500 mg tablet (Zithromax) 500 mg PO DAILY 3 days #3 tabs 12/05/23 [Rx Last Taken Unknown]
food supplemt, lactose-reduced 0.08 gram-1.5 kcal/mL oral liquid (Ensure Plus High Protein) 120 ml PO TIDCM #0 mL 12/05/23 [Rx Last Taken Unknown]
guaifenesin 1,200 mg tablet, extended release 12 hr (Mucus Relief ER) 1,200 mg PO BID #0 tabs 12/05/23 [Rx Last Taken Unknown]
ipratropium 0.5 mg-albuterol 3 mg (2.5 mg base)/3 mL nebulization soln 3 ml inhalation Q4H.RT #0 mL 12/05/23 [Rx Last Taken Unknown]
melatonin 3 mg tablet 3 mg PO QHS PRN PRN Insomnia #0 tabs 12/05/23 [Rx Last Taken Unknown]
sennosides 8.6 mg-docusate sodium 50 mg tablet (Stool Softener-Stimulant Laxative) 2 tab PO BID PRN Constipation #0 tabs 12/05/23 [Rx Last Taken Unknown]
prednisone 20 mg tablet 40 mg PO DAILY 03/15/24 [History Last Taken Unknown]




Allergy/AdvReac Type Severity Reaction Status Date / Time
trimethoprim [From Bactrim] Allergy Intermediate hot and Verified 03/15/24 02:06
   diaphoretic  
Sulfa (Sulfonamide Allergy  Rash Verified 03/15/24 02:06
Antibiotics)     


Family History (Reviewed 03/15/24 @ 10:38 by Dr. Demetrius Huston MD)
Mother
 Diabetes
 Hypertension
 Cancer


Surgical History (Reviewed 03/15/24 @ 10:38 by Dr. Demetrius Huston MD)

History of incisional hernia repair
S/P colectomy
S/P hemorrhoidectomy


Social History (Reviewed 03/15/24 @ 10:38 by Dr. Demetrius Huston MD)
Smoking Status:  Former smoker 



ROS
Review of Systems
ROS Unobtainable: due to mental status

Physical Exam
Const
no apparent distress
Constitutional Narrative: 
BiPAP mask in place.  Obese.  Appears stated age
HEENT
normocephalic and head/scalp atraumatic
HEENT Narrative: 
Crowded posterior pharynx, Mallampati 4.

Eyes
PERRL, EOMs intact bilaterally and conjunctivae normal
Neck
supple
Neck Narrative: 
Significant lymphadenopathy noted.  Slight tracheal deviation to the left
General: trachea midline
Chest
inspection of chest normal
Resp
Resp Narrative: 
Good BiPAP synchrony
Effort and Inspection: tachypneic
Auscultation: wheezes and diminished lung sounds; Negative for rales or rhonchi
Cardio
regular rate, regular rhythm, S1 normal heart sound and S2 normal heart sound
GI
normal to inspection, nondistended, normoactive bowel sounds
GI Narrative: 
Protuberant abdomen
Extremity
no clubbing, cyanosis or edema
Skin
no rashes or lesions noted
Neuro
CN's II-XII intact bilaterally and no focal motor deficits
Psych
Psych Narrative: 
RASS -3
Mood & Affect: flat affect

Medical Records Data
Attestation: I reviewed the patient's medical records
Medical records narrative: 
Patient's last echocardiogram was completed in March 2019 showing an EF of 65% with stage I diastolic dysfunction.  Patient did not have significant valvular disease at this time.  Patient does appear to see Dr. Zavala as an outpatient, so no 
PFTs are available for review.  Did discuss with ENT on the phone about patient's condition and imaging studies.

Lab / Micro Data
Attestation: I reviewed the patient's lab results.

03/15/24 03:11          

03/15/24 03:11          

Labs: 
Laboratory Results - last 24 hr

03/15/24 03:11: WBC 19.0 H, RBC 4.11 L, Hgb 11.9 L, Hct 41.0, MCV 99.8 H, MCH 29.0, MCHC 29.0 L, RDW Std Deviation 46.6 H, RDW Coeff of Regi 12.8, Plt Count 194, MPV 10.9, Immature Gran % (Auto) 0.500, Neut % (Auto) 80.5 H, Lymph % (Auto) 9.4 L, Mono 
% (Auto) 9.3, Eos % (Auto) 0.1, Baso % (Auto) 0.2, Absolute Neuts (auto) 15.3 H, Absolute Lymphs (auto) 1.79, Nucleated RBC % 0, Differential Comment SCANNED, Diff Path Review May foll, Sodium 141, Potassium 5.3 H, Chloride 105, Carbon Dioxide 33.0 
H, Anion Gap 3 L, BUN 19 H, Creatinine 0.78, Estim Creat Clear Calc 97.47, Est GFR (MDRD) Af Amer 124, Est GFR (MDRD) Non-Af 103, BUN/Creatinine Ratio 24.4 H, Glucose 120 H, Calcium 8.9, Magnesium 1.8, B-Natriuretic Peptide 140.1 H, Procalcitonin 
0.07
03/15/24 05:24: Lactic Acid 0.5
03/15/24 09:45: Lactic Acid 0.6


Micro: 
Microbiology

03/15/24 02:18   Mucosa - Nose   SARS-CoV-2, Influenza & RSV (PCR) - Final
                            RSV
03/15/24 02:18   Mucosa - Throat   Streptococcus pyogenes (PCR) - Final


ABG Data
ABG results: 
ABG

  03/15/24 03/15/24 03/15/24
  04:25 05:40 08:33
Specimen Type  ART  ART  ART
Sample Site  L Radial  L Radial  L Radial
pH  7.25 L  7.23 L  7.24 L
Bicarbonate Actual  36.7 H  37.9 H  36.3 H
Total CO2  39  41  39
Base Excess  9 H  10 H  9 H
O2 Saturation  78 L  94 L  92 L
O2 %  4.0  50.0  40.0
ABG pCO2  84.5 H*  91.3 H*  85.5 H*
ABG pO2  52 L  87  81
Tra Test  Positive  Positive  Positive
Respiration Rate   12  14
O2 Delivery Device  Cannula  BiPAP  BiPAP
Vent Mode  Not entered  NIV  Not entered
Tidal Volume   500.0  500.0
POC PEEP   8  8
Crit Call To/Read Back  Yes  Yes  Yes
Blood Gas Notified Whom  Angel Walsh
Blood Gas Notified Time  04:26:53  05:42:09  08:35:18
Clinical Comments     AVAPS


Attestation: I personally reviewed and interpreted this ABG as follows: (See HPI.  Acute on chronic respiratory acidosis not responsive to AVAPS)
Imaging
Radiology Impression

Soft Tissue Neck CT  03/15/24 02:52
IMPRESSION:
 
1.  Marked enlargement of the palatine tonsils and prominent soft tissue
swelling of the hypopharyngeal structures predominantly on the right with
some mass effect on the hypopharyngeal airway. This may all be due to
infection. A hypopharyngeal mass cannot be completely excluded. Recommend
follow-up.
 
2.  No cervical lymphadenopathy.
 
N.B. : The above Results were Read Back by Chris De Jesus MD to Stan Ortiz DO, and understanding confirmed on 03/15/2024 04:51:25 (ET).
 
Electronically Signed:
Chris De Jesus MD
2024/03/15 at 4:54 EDT
Reading Location ID and State: [id number] / [place]
Tel [phone], Service support  [phone], Fax [phone]
 

ADDENDUM: 03/15/24 0501
IMPRESSION:
 
1.  Marked enlargement of the palatine tonsils and prominent soft tissue
swelling of the hypopharyngeal structures predominantly on the right with
some mass effect on the hypopharyngeal airway. This may all be due to
infection. A hypopharyngeal mass cannot be completely excluded. Recommend
follow-up.
 
2.  No cervical lymphadenopathy.
 
N.B. : The above Results were Read Back by Chris De Jesus MD to Stan Ortiz DO, and understanding confirmed on 03/15/2024 04:51:25 (ET).
 
Electronically Signed:
Chris De Jesus MD
2024/03/15 at 4:54 EDT
Reading Location ID and State: [address] / [place]
Tel [phone], Service support  [phone], Fax [phone]
 


Chest X-Ray  03/15/24 03:45
IMPRESSION:
 
1.  Coarse interstitial disease in the lung bases bilaterally likely due to
pulmonary fibrosis unchanged since previous exam.
 
2.  Borderline cardiomegaly.
 
Electronically Signed:
Chris De Jesus MD
2024/03/15 at 4:41 EDT
Reading Location ID and State: [address] / [place]
Tel [phone], Service support  [phone], Fax [phone]
 




Charges/Coding
Procedures Hospitalists
Procedures: [id number] Critical Care 1st Hr

## 2024-03-15 NOTE — PCM.RX.CS
Consult
Antibiotic Management
Pharmacy has been consulted to manage selected antibiotic: Vancomycin
Type of Intervention
Type of Consult: New start
Suspected Infection
Suspected Infection: Other
Labs
Labs: 


Sodium  141 mmol/L (136-145)   03/15/24  03:11    
Potassium  5.3 mmol/L (3.5-5.1)  H  03/15/24  03:11    
Chloride  105 mmol/L ()   03/15/24  03:11    
Carbon Dioxide  33.0 mmol/L (21.0-32.0)  H  03/15/24  03:11    
Anion Gap  3  (5-15)  L  03/15/24  03:11    
BUN  19 mg/dL (7-18)  H  03/15/24  03:11    
Creatinine  0.78 mg/dL (0.70-1.30)   03/15/24  03:11    
Est GFR (MDRD) Af Amer  124 mL/min (>60)   03/15/24  03:11    
Est GFR (MDRD) Non-Af  103 mL/min (>60)   03/15/24  03:11    
BUN/Creatinine Ratio  24.4 RATIO (10-20)  H  03/15/24  03:11    
Glucose  120 mg/dL ()  H  03/15/24  03:11    


Microbiology
Microbiology: 
Microbiology

03/15/24 02:18   Mucosa - Nose   SARS-CoV-2, Influenza & RSV (PCR) - Final
                            RSV
03/15/24 02:18   Mucosa - Throat   Streptococcus pyogenes (PCR) - Final


Goal Trough
Goal Trough: 15-20 mcg/mL
Pharmacy Plan for Drug Dosing
Pharmacy Plan for Drug Dosing: 

NEW START IV VANCOMYCIN
Consulting Physician: Dr. Mendez
Indication: respiratory failure/ COPD exacerbation
Goal Trough: 15-20
SrCr: 0.78
CrCl: 97 mL/min
Comments: Patient was ordered a 2000mg IV x1 loading dose. Administered 3/15 @1007
Vancomycin Dose: 1250mg IV Q8h to start 3/15/24 @1800
Pending Level: 3/16/24 @0930, prior to 4th total dose of vancomycin per protocol

Pharmacy Service will continue to monitor and adjust dosing as required.

## 2024-03-15 NOTE — HP.PCM.HOS_ITS
[facility] - General    
General    
Date of Admission: 03/15/24    
Date of Service: 03/15/24    
Chief Complaint: SOB.    
[facility] Narrative    
JORGE MAYNARD, is a 78 M with a past medical history of essential hypertension,   
hyperlipidemia, obesity; with BMI of 34.7 this admission, history of PAF; on   
Eliquis, chronic diastolic CHF; with preserved LVEF, history of SBO, RLS, BPH,   
depression with anxiety, GERD, OA, history of tobacco abuse; with subsequent   
COPD and recently diagnosed Strep throat in the setting of DNR-CCA Code Status;   
with no intubation who presents to [facility] ER complaining of   
SOB.  Mr. Maynard reports his symptoms began approximately one day prior to   
admission with ORDONEZ that progressed to SOB at rest.  He also admits to wheezing   
with a nonproductive cough.  He denies associated fever, chills, nausea or   
vomiting.  In the ER he was diagnosed with AE COPD; with acute hypoxic and   
hypercapnic respiratory failure requiring BiPAP complicated by recently   
diagnosed severe streptococcal pharyngitis with markedly enlarged palatine   
tonsils and possible mass effect on the hypopharyngeal airway and he was then   
admitted to the ICU for ongoing care for a stay that is expected to be greater   
than 48 hours.     
    
    
[facility]    
Medical History (Updated 03/15/24 @ 05:40 by Dr. Gómez Tesfaye, )    
    
Acute respiratory failure, unspecified whether with hypoxia or hypercapnia    
Adult failure to thrive    
Anxiety    
Asthma    
Back pain    
Chronic obstructive lung disease    
Colon cancer    
Confusion    
COPD (chronic obstructive pulmonary disease)    
COPD (chronic obstructive pulmonary disease)    
Dementia    
Depression    
Dysphagia, oropharyngeal phase    
Emphysema, interstitial    
Former smoker    
Gastroesophageal reflux disease    
History of colon cancer    
History of colonic diverticulitis    
HTN (hypertension)    
Hyperlipidemia    
Hypertension    
Insomnia, unspecified    
Obstructive sleep apnea (adult) (pediatric)    
Osteoarthritis    
Paroxysmal atrial fibrillation    
Primary osteoarthritis, right shoulder    
Right shoulder pain    
    
    
                                Home Medications    
    
tamsulosin 0.4 mg capsule 0.4 mg PO QHS urination 10/31/13 [History Last Taken   
08/16/21]    
pramipexole 0.25 mg tablet 0.25 mg PO QHS restless legs 11/21/16 [History Last   
Taken 08/16/21]    
albuterol sulfate 2.5 mg/3 mL (0.083 %) solution for nebulization 2.5 mg   
inhalation Q4H PRN PRN copd 05/26/19 [History Last Taken 08/14/21]    
albuterol sulfate 90 mcg/actuation aerosol inhaler (ProAir HFA) 2 puff PO Q6H   
PRN Shortness Of Breath 05/26/19 [History Last Taken Unknown]    
pantoprazole 40 mg tablet,delayed release 40 mg PO DAILY gerd 05/26/19 [History   
Last Taken 08/16/21]    
roflumilast 250 mcg tablet (Daliresp) 250 mcg PO BID copd 05/26/19 [History Last  
 Taken 08/16/21]    
sertraline 100 mg tablet (Zoloft) 150 mg PO QHS depression 05/26/19 [History   
Last Taken 08/16/21]    
ergocalciferol (vitamin D2) 1,250 mcg (50,000 unit) capsule 50,000 unit PO [facility] 12/08/19 [History Last Taken 08/13/21]    
fluticasone fur. 100 mcg-umeclid 62.5 mcg-vilant 25 mcg inhalat.powder (Trelegy   
Ellipta) 1 inh inhalation DAILY COPD 08/18/21 [History Last Taken 08/16/21]    
atorvastatin 10 mg tablet 10 mg PO QHS 08/12/22 [History Last Taken Unknown]    
furosemide 40 mg tablet (Lasix) 40 mg PO DAILY 08/12/22 [History Last Taken   
Unknown]    
psyllium 1 packet PO DAILY 08/12/22 [History Last Taken Unknown]    
acetazolamide 250 mg tablet 250 mg PO BID 10/10/23 [History Last Taken Unknown]    
apixaban 5 mg tablet (Eliquis) 5 mg PO BID 10/10/23 [History Last Taken Unknown]    
clotrimazole-betamethasone 1 %-0.05 % topical cream 1 applic topical BID   
10/10/23 [History Last Taken Unknown]    
metoprolol tartrate 50 mg tablet 50 mg PO Q12H 10/10/23 [History Last Taken   
Unknown]    
ferrous sulfate 325 mg (65 mg iron) tablet (FeroSul) 325 mg PO BID 12/01/23   
[History Last Taken Unknown]    
acetaminophen 325 mg tablet 650 mg (2 x 325 mg) PO Q6H PRN PRN Pain 1-10 Or   
Fever >100.7 #0 tabs 12/05/23 [Rx Last Taken Unknown]    
azithromycin 500 mg tablet (Zithromax) 500 mg PO DAILY 3 days #3 tabs 12/05/23   
[Rx Last Taken Unknown]    
food supplemt, lactose-reduced 0.08 gram-1.5 kcal/mL oral liquid (Ensure Plus   
High Protein) 120 ml PO TIDCM #0 mL 12/05/23 [Rx Last Taken Unknown]    
guaifenesin 1,200 mg tablet, extended release 12 hr (Mucus Relief ER) 1,200 mg   
PO BID #0 tabs 12/05/23 [Rx Last Taken Unknown]    
ipratropium 0.5 mg-albuterol 3 mg (2.5 mg base)/3 mL nebulization soln 3 ml   
inhalation Q4H.RT #0 mL 12/05/23 [Rx Last Taken Unknown]    
melatonin 3 mg tablet 3 mg PO QHS PRN PRN Insomnia #0 tabs 12/05/23 [Rx Last   
Taken Unknown]    
sennosides 8.6 mg-docusate sodium 50 mg tablet (Stool Softener-Stimulant   
Laxative) 2 tab PO BID PRN Constipation #0 tabs 12/05/23 [Rx Last Taken Unknown]    
prednisone 20 mg tablet 40 mg PO DAILY 03/15/24 [History Last Taken Unknown]    
    
    
                                            
    
    
    
Allergy/AdvReac Type Severity Reaction Status Date / Time    
     
trimethoprim [From Bactrim] Allergy Intermediate hot and Verified 03/15/24 02:06    
    
   diaphoretic      
     
Sulfa (Sulfonamide Allergy  Rash Verified 03/15/24 02:06    
    
Antibiotics)         
    
    
    
Family History (Reviewed 12/01/23 @ 09:03 by Dr. Eric Parra MD)    
Mother   Diabetes    
   Hypertension    
   Cancer    
    
    
Surgical History (Reviewed 12/01/23 @ 09:03 by Dr. Eric Parra MD)    
    
History of incisional hernia repair    
S/P colectomy    
S/P hemorrhoidectomy    
    
    
Social History (Reviewed 12/01/23 @ 09:03 by Dr. Eric Parra MD)    
Smoking Status:  Former smoker     
    
    
    
Vital Signs    
Vital Signs    
Vital Signs:     
                                            
    
    
    
 03/15/24    
02:05 03/15/24    
02:05 03/15/24    
03:04    
     
Temperature 99 F      
     
Temperature Source Temporal      
     
Pulse Rate 90  93    
     
Respiratory Rate 22 H  30 H    
     
Respiratory Effort  Short of Breath     
     
Respiratory Depth  Shallow     
     
Respiratory Pattern  Tachypnea Tachypnea    
     
Blood Pressure 132/68 H      
     
Blood Pressure Mean 89      
     
Pulse Ox 93      
     
Oxygen Delivery Method Nasal Cannula Nasal Cannula     
     
Oxygen Flow Rate (L/min) 3 3     
     
Fraction of Inspired Oxygen (FIO2)       
    
    
    
    
 03/15/24    
04:05 03/15/24    
04:43 03/15/24    
04:44    
     
Temperature       
     
Temperature Source       
     
Pulse Rate 98 93 96    
     
Respiratory Rate 27 H 25 H 19 H    
     
Respiratory Effort       
     
Respiratory Depth       
     
Respiratory Pattern       
     
Blood Pressure 136/56 H 136/75 H 141/90 H    
     
Blood Pressure Mean 82 95 107    
     
Pulse Ox 88 95 93    
     
Oxygen Delivery Method Nasal Cannula Bi-pap Bi-pap    
     
Oxygen Flow Rate (L/min) 4      
     
Fraction of Inspired Oxygen (FIO2)       
    
    
    
    
 03/15/24    
02:30 03/15/24    
02:45 03/15/24    
03:00    
     
Temperature       
     
Temperature Source       
     
Pulse Rate 92 96 92    
     
Respiratory Rate 37 H 27 H 23 H    
     
Respiratory Effort       
     
Respiratory Depth       
     
Respiratory Pattern       
     
Blood Pressure 136/70 H 145/66 H 129/59 H    
     
Blood Pressure Mean 89 88 79    
     
Pulse Ox       
     
Oxygen Delivery Method       
     
Oxygen Flow Rate (L/min)       
     
Fraction of Inspired Oxygen (FIO2)       
    
    
    
    
 03/15/24    
03:16 03/15/24    
03:30 03/15/24    
03:45    
     
Temperature       
     
Temperature Source       
     
Pulse Rate 92 94     
     
Respiratory Rate 37 H 22 H     
     
Respiratory Effort       
     
Respiratory Depth       
     
Respiratory Pattern       
     
Blood Pressure 135/60 H 151/67 H 126/70 H    
     
Blood Pressure Mean 78 87 87    
     
Pulse Ox 94      
     
Oxygen Delivery Method       
     
Oxygen Flow Rate (L/min)       
     
Fraction of Inspired Oxygen (FIO2)       
    
    
    
    
 03/15/24    
04:00 03/15/24    
04:15 03/15/24    
04:30    
     
Temperature       
     
Temperature Source       
     
Pulse Rate 95 95 95    
     
Respiratory Rate 31 H 26 H 29 H    
     
Respiratory Effort       
     
Respiratory Depth       
     
Respiratory Pattern       
     
Blood Pressure 139/56 H 135/67 H 136/75 H    
     
Blood Pressure Mean 80 86 90    
     
Pulse Ox 83  91    
     
Oxygen Delivery Method Nasal Cannula  Bi-pap    
     
Oxygen Flow Rate (L/min) 6      
     
Fraction of Inspired Oxygen (FIO2)       
    
    
    
    
 03/15/24    
04:43 03/15/24    
04:55    
     
Temperature  98.2 F    
     
Temperature Source  Temporal    
     
Pulse Rate 92 92    
     
Respiratory Rate 28 H 22 H    
     
Respiratory Effort      
     
Respiratory Depth      
     
Respiratory Pattern Tachypnea     
     
Blood Pressure  141/90 H    
     
Blood Pressure Mean  107    
     
Pulse Ox 95 93    
     
Oxygen Delivery Method  Bi-pap    
     
Oxygen Flow Rate (L/min)      
     
Fraction of Inspired Oxygen (FIO2) 50     
    
    
                                     Weight    
    
    
    
Weight:                        242 lb 1.081 oz                                    
    
     
Body Mass Index (BMI)          34.7                                               
    
    
    
    
    
    
Results    
Lab / Micro Data    
    
                                                        03/15/24 03:11              
    
                                                        03/15/24 03:11              
    
Labs:     
                         Laboratory Results - last 24 hr    
    
03/15/24 03:11: WBC 19.0 H, RBC 4.11 L, Hgb 11.9 L, Hct 41.0, MCV 99.8 H, MCH   
29.0, MCHC 29.0 L, RDW Std Deviation 46.6 H, RDW Coeff of Regi 12.8, Plt Count   
194, MPV 10.9, Immature Gran % (Auto) 0.500, Neut % (Auto) 80.5 H, Lymph %   
(Auto) 9.4 L, Mono % (Auto) 9.3, Eos % (Auto) 0.1, Baso % (Auto) 0.2, Absolute   
Neuts (auto) 15.3 H, Absolute Lymphs (auto) 1.79, Nucleated RBC % 0,   
Differential Comment SCANNED, Diff Path Review May foll, Sodium 141, Potassium   
5.3 H, Chloride 105, Carbon Dioxide 33.0 H, Anion Gap 3 L, BUN 19 H, Creatinine   
0.78, Estim Creat Clear Calc 97.47, Est GFR (MDRD) Af Amer 124, Est GFR (MDRD)   
Non-Af 103, BUN/Creatinine Ratio 24.4 H, Glucose 120 H, Calcium 8.9, Magnesium   
1.8, B-Natriuretic Peptide 140.1 H, Procalcitonin 0.07    
    
    
Micro:     
                                  Microbiology    
    
03/15/24 02:18   Mucosa - Nose   SARS-CoV-2, Influenza & RSV (PCR) - Final    
                            RSV    
03/15/24 02:18   Mucosa - Throat   Streptococcus pyogenes (PCR) - Final    
    
    
ABG Data    
ABG results:     
                                       ABG    
    
    
    
  03/15/24    
    
  04:25    
     
Specimen Type  ART    
     
Sample Site  L Radial    
     
pH  7.25 L    
     
Bicarbonate Actual  36.7 H    
     
Total CO2  39    
     
Base Excess  9 H    
     
O2 Saturation  78 L    
     
O2 %  4.0    
     
ABG pCO2  84.5 H*    
     
ABG pO2  52 L    
     
Tra Test  Positive    
     
O2 Delivery Device  Cannula    
     
Vent Mode  Not entered    
     
Crit Call To/Read Back  Yes    
     
Blood Gas Notified Enrique Ortiz    
     
Blood Gas Notified Time  04:26:53    
    
    
    
Imaging    
                              Radiology Impression    
    
Soft Tissue Neck CT  03/15/24 02:52    
IMPRESSION:    
     
1.  Marked enlargement of the palatine tonsils and prominent soft tissue    
swelling of the hypopharyngeal structures predominantly on the right with    
some mass effect on the hypopharyngeal airway. This may all be due to    
infection. A hypopharyngeal mass cannot be completely excluded. Recommend    
follow-up.    
     
2.  No cervical lymphadenopathy.    
     
N.B. : The above Results were Read Back by Chris De Jesus MD to Stan    
Andes , DO, and understanding confirmed on 03/15/2024 04:51:25 (ET).    
     
Electronically Signed:    
Chris De Jesus MD    
2024/03/15 at 4:54 EDT    
Reading Location ID and State: [id number] / [place]    
Tel [phone], Service support  [phone], Fax [phone]    
     
    
ADDENDUM: 03/15/24 0501    
IMPRESSION:    
     
1.  Marked enlargement of the palatine tonsils and prominent soft tissue    
swelling of the hypopharyngeal structures predominantly on the right with    
some mass effect on the hypopharyngeal airway. This may all be due to    
infection. A hypopharyngeal mass cannot be completely excluded. Recommend    
follow-up.    
     
2.  No cervical lymphadenopathy.    
     
N.B. : The above Results were Read Back by Chris De Jesus MD to Stan Ortiz DO, and understanding confirmed on 03/15/2024 04:51:25 (ET).    
     
Electronically Signed:    
Chris De Jesus MD    
2024/03/15 at 4:54 EDT    
Reading Location ID and State: [id number] / [place]    
Tel [phone], Service support  [phone], Fax [phone]    
     
    
    
Chest X-Ray  03/15/24 03:45    
IMPRESSION:    
     
1.  Coarse interstitial disease in the lung bases bilaterally likely due to    
pulmonary fibrosis unchanged since previous exam.    
     
2.  Borderline cardiomegaly.    
     
Electronically Signed:    
Chris De Jesus MD    
2024/03/15 at 4:41 EDT    
Reading Location ID and State: [id number] / [place]    
Tel [phone], Service support  [phone], Fax [phone]    
     
    
    
    
    
Assessment & Plan

## 2024-03-15 NOTE — CASEMGMT
Social Work

SW spoke with pt's sister/HCPOA Lamar Hand.  Lamar confirms pt is a long term pt at [facility] and plan is to return there upon discharge.  DC assistant updated and to send clinicals to the [facility].

Plan:  Return to [facility]

PATRICK Plaza

## 2024-03-15 NOTE — CASEMGMT
Discharge Planning
Updates sent via [facility] to [facility].  Asked if patient will need precert to return.  Awaiting response.
Angelic Stringer, Discharge Planning Asst.

## 2024-03-15 NOTE — PCM.HOSP.N
Hospitalist Note
Patient is a 78-year-old male resident of the [facility] who has a baseline oxygen requirement of 3 L due to COPD who presented to the emergency department at [facility] early this morning and was admitted about 6 AM with 2 to 3 days of 
increased cough and congestion.  He also reported that he had not been able to swallow and despite wearing his oxygen and taking his medications he felt like he was having worsening shortness of breath.  Due to this the nursing facility sent him in 
for evaluation.  Evidently, strep throat is going around the facility which she resides and he was diagnosed with strep throat and placed on antibiotics but did not improve.  Initially on presentation he was afebrile with a temperature of 99, blood 
pressure was 132/68, respiratory was 22 and oxygen saturation was 93% on his baseline 3 L.  His respiratory status slowly declined and he desatted to 83% on 6 L at which time he was placed on BiPAP.  His CBC shows a marked leukocytosis with a white 
count of 19.0.  He does have a left shift with an 80.5% neutrophilia.  His ABG initially showed a pH of 7.23 with a pO2 of 87 and a pCO2 of 91.3.  His chemistry panel showed hyperkalemia with a potassium of 5.3 likely induced from his acidosis, 
normal renal function, slightly elevated glucose at 120, normal lactic acid and a slightly elevated BNP and 140.1.  His procalcitonin was 0.07.  RSV PCR was positive.  Chest x-ray was overtly unremarkable.  CT of the neck soft tissues demonstrated 
marked enlargement of the palate teen tonsils and prominent soft tissue swelling of the hypopharyngeal structures predominantly on the right with some mass effect on the hypopharyngeal airway with suspected infection however mass could not be 
explored deluded.  No cervical lymphadenopathy was identified.  The patient was given Decadron in the emergency department and then transition to Solu-Medrol to treat both his tonsillar edema and acute COPD exacerbation, he was placed on 
broad-spectrum antibiotics until cultures result and maintained on BiPAP.  ENT and critical care medicine have been consulted.  The patient is a DNR CCA with no intubation.


Diagnoses:
Acute on chronic hypoxic and hypercapnic respiratory failure secondary to RSV infection
Tonsillar swelling--> mass versus infection
Hyperkalemia secondary to acidosis
Respiratory acidosis
Acute RSV infection
Acute exacerbation of COPD
Leukocytosis
Chronic stable anemia
Elevated BNP
GERD
Hyperlipidemia
Hypertension
DAVONTE
PAF
Generalized weakness and debility
Depression
History of tobacco abuse

## 2024-03-15 NOTE — CON.PCM.CC_ITS
Assessment & Plan    
Assessment/Plan    
(1) Acute hypoxic on chronic hypercapnic respiratory failure:     
(2) Acute on chronic respiratory failure with hypoxia:     
(3) Respiratory syncytial virus (RSV) infection:     
(4) Pharyngeal edema:     
PLAN:     
Plan    
    
RECOMMENDATIONS:    
    
   1.  Continue empiric antibiotics, bronchodilators and steroids    
   2.  Await ENT recommendations    
   3.  Continue BiPAP around-the-clock    
   4.  Confirm CODE STATUS is DNR/DNI    
   5.  Attempt to volume status even.  DC IV fluids    
    
IMPRESSIONS:    
    
   1.  Acute on chronic combined respiratory failure secondary to RSV in the   
setting of COPD    
      Patient appears to have relatively advanced COPD by clinical criteria.    
PFTs are not available for review.  Patient appears to be oxygenating okay, but   
has significant respiratory acidosis.  Patient currently on AVAPS as CODE STATUS  
is DNR/DNI from the nursing home.  Unfortunately, patient has tested positive   
for RSV and there is little pharmacologic intervention outside of steroids that   
can be used.  May need to reevaluate CODE STATUS if patient decompensates, but   
will continue with BiPAP for now for permissive hypercapnia.    
    
   2.  Pharyngeal mass    
      Patient was significant swelling and deviation from a pharyngeal mass on   
CT scan.  Unclear if patient has a peritonsillar abscess, but I cannot see any   
fluid pocket.  ENT to evaluate.  Procalcitonin is within normal limits   
suggesting that RSV may be the predominant factor in patient's respiratory   
decline.  Patient does not have any stridor, but this is difficult to assess   
given patient's need for BiPAP therapy.    
    
   3.  Chronic HFpEF/history of PAF    
      Heart rate appears to be well-controlled at this time.  Patient does not   
appear to be significantly volume overloaded clinically.  Will attempt to keep   
patient even on I's and O's for now.  Likely not necessary to obtain an   
echocardiogram at this time as I do not believe this is his predominant   
etiology.    
    
   4.  Hypertension/hyperlipidemia/history of SBO/RLS/BPH/GERD/depression/ad  
vanced age    
      Complicates care, management, recovery and prognosis.  Patient taken off   
of Eliquis secondary to concerns for need for intervention.  Once patient is off  
of BiPAP, home medications can be reinitiated.  As needed BP meds have been   
ordered.  Patient's blood pressures are adequate at this time.  Patient's CODE   
STATUS from nursing home was a DNR/DNI.  Will need to discuss with family on if   
this is still wishes.  May need to reevaluate for possible hospice if patient's   
respiratory status continues to decline.    
    
TIME:    
    
   45 minutes critical care time spent addressing patient's respiratory failure,  
pharyngeal mass, CHF, review of all data and collaboration with care team    
    
    
HPI    
Consult Data    
Date of Consult: 03/15/24    
HPI Narrative    
HPI Narrative:     
JORGE MAYNARD is a 78 M, with past medical history listed below, who presents to   
[facility] on 3/15/2024 secondary to progressive chest congestion and   
cough.  Patient does carry diagnosis of COPD and is on 3 L nasal cannula at   
baseline.  Patient does state a nursing home at baseline.  Patient reportedly   
was recently diagnosed with strep pharyngitis and was treated with azithromycin.    
    
In the ER, patient was afebrile, but tachypneic as high as 30 breaths/min.    
Patient was originally on his baseline 3 L nasal cannula but had to be trans  
ition to BiPAP secondary to respiratory distress.  Laboratory data showed a   
white blood cell count of 19, hemoglobin of 11.9 and platelets of 194.  Patient   
had a potassium of 5.3, bicarb of 33 and a glucose of 120.  BNP was elevated at   
140.  Patient had an ABG on his baseline nasal cannula showing partially   
compensated acute on chronic respiratory acidosis with increased AA gradient   
(7.2 5/85/52/78%).  Soft tissue CT scan of the neck showed marked enlargement of  
the [place] tonsils with prominent tissue on the right with some mass effect.    
Chest x-ray showed bilateral coarse infiltrates.  Patient admitted to the   
intensive care unit for further evaluation.    
    
On presentation to the intensive care unit, patient was compliant with BiPAP   
with good synchrony.  Patient not able to answer questions.  Patient was   
subsequently found to have RSV.  No family at the bedside to provide additional   
information.  Unable to obtain review of systems secondary to mental status.    
    
    
[facility]    
Medical History (Reviewed 03/15/24 @ 10:38 by Dr. Demetrius Huston MD)    
    
Acute respiratory failure, unspecified whether with hypoxia or hypercapnia    
Adult failure to thrive    
Anxiety    
Asthma    
Back pain    
Chronic obstructive lung disease    
Colon cancer    
Confusion    
COPD (chronic obstructive pulmonary disease)    
COPD (chronic obstructive pulmonary disease)    
Dementia    
Depression    
Dysphagia, oropharyngeal phase    
Emphysema, interstitial    
Former smoker    
Gastroesophageal reflux disease    
History of colon cancer    
History of colonic diverticulitis    
HTN (hypertension)    
Hyperlipidemia    
Hypertension    
Insomnia, unspecified    
Obstructive sleep apnea (adult) (pediatric)    
Osteoarthritis    
Paroxysmal atrial fibrillation    
Primary osteoarthritis, right shoulder    
Right shoulder pain    
    
    
                                Home Medications    
    
tamsulosin 0.4 mg capsule 0.4 mg PO QHS urination 10/31/13 [History Last Taken   
08/16/21]    
pramipexole 0.25 mg tablet 0.25 mg PO QHS restless legs 11/21/16 [History Last   
Taken 08/16/21]    
albuterol sulfate 2.5 mg/3 mL (0.083 %) solution for nebulization 2.5 mg inhala  
tion Q4H PRN PRN copd 05/26/19 [History Last Taken 08/14/21]    
albuterol sulfate 90 mcg/actuation aerosol inhaler (ProAir HFA) 2 puff PO Q6H   
PRN Shortness Of Breath 05/26/19 [History Last Taken Unknown]    
pantoprazole 40 mg tablet,delayed release 40 mg PO DAILY gerd 05/26/19 [History   
Last Taken 08/16/21]    
roflumilast 250 mcg tablet (Daliresp) 250 mcg PO BID copd 05/26/19 [History Last  
 Taken 08/16/21]    
sertraline 100 mg tablet (Zoloft) 150 mg PO QHS depression 05/26/19 [History   
Last Taken 08/16/21]    
ergocalciferol (vitamin D2) 1,250 mcg (50,000 unit) capsule 50,000 unit PO [facility] 12/08/19 [History Last Taken 08/13/21]    
fluticasone fur. 100 mcg-umeclid 62.5 mcg-vilant 25 mcg inhalat.powder (Trelegy   
Ellipta) 1 inh inhalation DAILY COPD 08/18/21 [History Last Taken 08/16/21]    
atorvastatin 10 mg tablet 10 mg PO QHS 08/12/22 [History Last Taken Unknown]    
furosemide 40 mg tablet (Lasix) 40 mg PO DAILY 08/12/22 [History Last Taken   
Unknown]    
psyllium 1 packet PO DAILY 08/12/22 [History Last Taken Unknown]    
acetazolamide 250 mg tablet 250 mg PO BID 10/10/23 [History Last Taken Unknown]    
apixaban 5 mg tablet (Eliquis) 5 mg PO BID 10/10/23 [History Last Taken Unknown]    
clotrimazole-betamethasone 1 %-0.05 % topical cream 1 applic topical BID   
10/10/23 [History Last Taken Unknown]    
metoprolol tartrate 50 mg tablet 50 mg PO Q12H 10/10/23 [History Last Taken   
Unknown]    
ferrous sulfate 325 mg (65 mg iron) tablet (FeroSul) 325 mg PO BID 12/01/23   
[History Last Taken Unknown]    
acetaminophen 325 mg tablet 650 mg (2 x 325 mg) PO Q6H PRN PRN Pain 1-10 Or   
Fever >100.7 #0 tabs 12/05/23 [Rx Last Taken Unknown]    
azithromycin 500 mg tablet (Zithromax) 500 mg PO DAILY 3 days #3 tabs 12/05/23   
[Rx Last Taken Unknown]    
food supplemt, lactose-reduced 0.08 gram-1.5 kcal/mL oral liquid (Ensure Plus   
High Protein) 120 ml PO TIDCM #0 mL 12/05/23 [Rx Last Taken Unknown]    
guaifenesin 1,200 mg tablet, extended release 12 hr (Mucus Relief ER) 1,200 mg   
PO BID #0 tabs 12/05/23 [Rx Last Taken Unknown]    
ipratropium 0.5 mg-albuterol 3 mg (2.5 mg base)/3 mL nebulization soln 3 ml   
inhalation Q4H.RT #0 mL 12/05/23 [Rx Last Taken Unknown]    
melatonin 3 mg tablet 3 mg PO QHS PRN PRN Insomnia #0 tabs 12/05/23 [Rx Last   
Taken Unknown]    
sennosides 8.6 mg-docusate sodium 50 mg tablet (Stool Softener-Stimulant   
Laxative) 2 tab PO BID PRN Constipation #0 tabs 12/05/23 [Rx Last Taken Unknown]    
prednisone 20 mg tablet 40 mg PO DAILY 03/15/24 [History Last Taken Unknown]    
    
    
                                            
    
    
    
Allergy/AdvReac Type Severity Reaction Status Date / Time    
     
trimethoprim [From Bactrim] Allergy Intermediate hot and Verified 03/15/24 02:06    
    
   diaphoretic      
     
Sulfa (Sulfonamide Allergy  Rash Verified 03/15/24 02:06    
    
Antibiotics)         
    
    
    
Family History (Reviewed 03/15/24 @ 10:38 by Dr. Demetrius Huston MD)    
Mother   Diabetes    
   Hypertension    
   Cancer    
    
    
Surgical History (Reviewed 03/15/24 @ 10:38 by Dr. Demetrius Huston MD)    
    
History of incisional hernia repair    
S/P colectomy    
S/P hemorrhoidectomy    
    
    
Social History (Reviewed 03/15/24 @ 10:38 by Dr. Demetrius Huston MD)    
Smoking Status:  Former smoker     
    
    
    
ROS    
Review of Systems    
ROS Unobtainable: due to mental status    
    
Physical Exam    
Const    
no apparent distress    
Constitutional Narrative:     
BiPAP mask in place.  Obese.  Appears stated age    
HEENT    
normocephalic and head/scalp atraumatic    
HEENT Narrative:     
Crowded posterior pharynx, Mallampati 4.    
    
Eyes    
PERRL, EOMs intact bilaterally and conjunctivae normal    
Neck    
supple    
Neck Narrative:     
Significant lymphadenopathy noted.  Slight tracheal deviation to the left    
General: trachea midline    
Chest    
inspection of chest normal    
Resp    
Resp Narrative:     
Good BiPAP synchrony    
Effort and Inspection: tachypneic    
Auscultation: wheezes and diminished lung sounds; Negative for rales or rhonchi    
Cardio    
regular rate, regular rhythm, S1 normal heart sound and S2 normal heart sound    
GI    
normal to inspection, nondistended, normoactive bowel sounds    
GI Narrative:     
Protuberant abdomen    
Extremity    
no clubbing, cyanosis or edema    
Skin    
no rashes or lesions noted    
Neuro    
CN's II-XII intact bilaterally and no focal motor deficits    
Psych    
Psych Narrative:     
RASS -3    
Mood & Affect: flat affect    
    
Medical Records Data    
Attestation: I reviewed the patient's medical records    
Medical records narrative:     
Patient's last echocardiogram was completed in March 2019 showing an EF of 65%   
with stage I diastolic dysfunction.  Patient did not have significant valvular   
disease at this time.  Patient does appear to see Dr. Zavala as an   
outpatient, so no PFTs are available for review.  Did discuss with ENT on the   
phone about patient's condition and imaging studies.    
    
Lab / Micro Data    
Attestation: I reviewed the patient's lab results.    
    
                                                        03/15/24 03:11              
    
                                                        03/15/24 03:11              
    
Labs:     
                         Laboratory Results - last 24 hr    
    
03/15/24 03:11: WBC 19.0 H, RBC 4.11 L, Hgb 11.9 L, Hct 41.0, MCV 99.8 H, MCH   
29.0, MCHC 29.0 L, RDW Std Deviation 46.6 H, RDW Coeff of Regi 12.8, Plt Count   
194, MPV 10.9, Immature Gran % (Auto) 0.500, Neut % (Auto) 80.5 H, Lymph %   
(Auto) 9.4 L, Mono % (Auto) 9.3, Eos % (Auto) 0.1, Baso % (Auto) 0.2, Absolute   
Neuts (auto) 15.3 H, Absolute Lymphs (auto) 1.79, Nucleated RBC % 0, Diff  
erential Comment SCANNED, Diff Path Review May foll, Sodium 141, Potassium 5.3 H  
, Chloride 105, Carbon Dioxide 33.0 H, Anion Gap 3 L, BUN 19 H, Creatinine 0.78,  
Estim Creat Clear Calc 97.47, Est GFR (MDRD) Af Amer 124, Est GFR (MDRD) Non-Af   
103, BUN/Creatinine Ratio 24.4 H, Glucose 120 H, Calcium 8.9, Magnesium 1.8, B-  
Natriuretic Peptide 140.1 H, Procalcitonin 0.07    
03/15/24 05:24: Lactic Acid 0.5    
03/15/24 09:45: Lactic Acid 0.6    
    
    
Micro:     
                                  Microbiology    
    
03/15/24 02:18   Mucosa - Nose   SARS-CoV-2, Influenza & RSV (PCR) - Final    
                            RSV    
03/15/24 02:18   Mucosa - Throat   Streptococcus pyogenes (PCR) - Final    
    
    
ABG Data    
ABG results:     
                                       ABG    
    
    
    
  03/15/24 03/15/24 03/15/24    
    
  04:25 05:40 08:33    
     
Specimen Type  ART  ART  ART    
     
Sample Site  L Radial  L Radial  L Radial    
     
pH  7.25 L  7.23 L  7.24 L    
     
Bicarbonate Actual  36.7 H  37.9 H  36.3 H    
     
Total CO2  39  41  39    
     
Base Excess  9 H  10 H  9 H    
     
O2 Saturation  78 L  94 L  92 L    
     
O2 %  4.0  50.0  40.0    
     
ABG pCO2  84.5 H*  91.3 H*  85.5 H*    
     
ABG pO2  52 L  87  81    
     
Tra Test  Positive  Positive  Positive    
     
Respiration Rate   12  14    
     
O2 Delivery Device  Cannula  BiPAP  BiPAP    
     
Vent Mode  Not entered  NIV  Not entered    
     
Tidal Volume   500.0  500.0    
     
POC PEEP   8  8    
     
Crit Call To/Read Back  Yes  Yes  Yes    
     
Blood Gas Notified Whom  Angel Walsh    
     
Blood Gas Notified Time  04:26:53  05:42:09  08:35:18    
     
Clinical Comments     AVAPS    
    
    
    
Attestation: I personally reviewed and interpreted this ABG as follows: (See   
HPI.  Acute on chronic respiratory acidosis not responsive to AVAPS)    
Imaging    
                              Radiology Impression    
    
Soft Tissue Neck CT  03/15/24 02:52    
IMPRESSION:    
     
1.  Marked enlargement of the palatine tonsils and prominent soft tissue    
swelling of the hypopharyngeal structures predominantly on the right with    
some mass effect on the hypopharyngeal airway. This may all be due to    
infection. A hypopharyngeal mass cannot be completely excluded. Recommend    
follow-up.    
     
2.  No cervical lymphadenopathy.    
     
N.B. : The above Results were Read Back by Chris De Jesus MD to Stan Ortiz DO, and understanding confirmed on 03/15/2024 04:51:25 (ET).    
     
Electronically Signed:    
Chris De Jesus MD    
2024/03/15 at 4:54 EDT    
Reading Location ID and State: [id number] / [place]    
Tel [phone], Service support  [phone], Fax [phone]    
     
    
ADDENDUM: 03/15/24 0501    
IMPRESSION:    
     
1.  Marked enlargement of the palatine tonsils and prominent soft tissue    
swelling of the hypopharyngeal structures predominantly on the right with    
some mass effect on the hypopharyngeal airway. This may all be due to    
infection. A hypopharyngeal mass cannot be completely excluded. Recommend    
follow-up.    
     
2.  No cervical lymphadenopathy.    
     
N.B. : The above Results were Read Back by Chris De Jesus MD to Stan Ortiz DO, and understanding confirmed on 03/15/2024 04:51:25 (ET).    
     
Electronically Signed:    
Chris De Jesus MD    
2024/03/15 at 4:54 EDT    
Reading Location ID and State: [id number] / [place]    
Tel [phone], Service support  [phone], Fax [phone]    
     
    
    
Chest X-Ray  03/15/24 03:45    
IMPRESSION:    
     
1.  Coarse interstitial disease in the lung bases bilaterally likely due to    
pulmonary fibrosis unchanged since previous exam.    
     
2.  Borderline cardiomegaly.    
     
Electronically Signed:    
Chris De Jesus MD    
2024/03/15 at 4:41 EDT    
Reading Location ID and State: [address] / [place]    
Tel [phone], Service support  [phone], Fax [phone]    
     
    
    
    
    
Charges/Coding    
Procedures Hospitalists    
Procedures: [id number] Critical Care 1st Hr

## 2024-03-15 NOTE — RAD_ITS
REPORT-ID:[id number] 
 
EXAM:  XR CHEST, 1 VIEW 
 
CLINICAL INDICATION:  dyspnea 
 
TECHNIQUE:  Frontal view of the chest. 
 
COMPARISON:  12/1/2023. 
 
FINDINGS: 
LUNGS AND PLEURAL SPACES:  Coarse interstitial disease in the lung bases 
bilaterally likely due to pulmonary fibrosis unchanged since previous exam. 
No pneumothorax.  No effusion. 
HEART:  Borderline cardiomegaly. 
MEDIASTINUM:  Central airways and mediastinal contour are unremarkable. 
BONES/JOINTS:  Unremarkable.  No acute fracture. 
SOFT TISSUES:  Unremarkable. 
 
ORDER #: [id number] RAD/Chest 1 View (Portable)  
IMPRESSION:  
 
  
1.  Coarse interstitial disease in the lung bases bilaterally likely due to  
pulmonary fibrosis unchanged since previous exam.  
 
  
2.  Borderline cardiomegaly.  
 
  
Electronically Signed:  
Chris De Jesus MD  
2024/03/15 at 4:41 EDT  
Reading Location ID and State: [id number] / [place]  
Tel [phone], Service support  [phone], Fax [phone]

## 2024-03-15 NOTE — CT_ITS
REPORT-ID:[id number] 
 
EXAM:  CT NECK WITH INTRAVENOUS CONTRAST 
 
CLINICAL INDICATION:  ? right peritonsillar abscess 
 
TECHNIQUE:  Helically acquired images were obtained of the neck with 
intravenous contrast.  This CT exam was performed using one or more of the 
following dose reduction techniques:  automated exposure control, 
adjustment of the mA and/or kV according to patient size, and/or use of 
iterative reconstruction technique. 
 
CONTRAST:  100 cc of Isovue-370 IV. 
 
RADIATION DOSE:  CTDIvol = 18.31 mGy, DLP = 553.37 mGy-cm 
 
COMPARISON:  No relevant prior studies available. 
 
FINDINGS: 
NASOPHARYNX:  Unremarkable. 
SUPRAHYOID NECK:  Marked enlargement of the palatine tonsils bilaterally. 
Soft tissue swelling of the right hypopharyngeal structures down to the 
level of the piriform sinus with mass effect on the hypopharyngeal airway. 
No discrete abscess identified. Small amount of gas noted within the left 
palatine tonsil may represent gas within crypts. 
INFRAHYOID NECK:  See above. 
SUBMANDIBULAR/PAROTID GLANDS:  Unremarkable.  Glands are normal in size. 
THYROID:  Unremarkable.  No enlarged or calcified nodules. 
BONES/JOINTS:  No acute fracture. 
SOFT TISSUES: Unremarkable. 
VASCULATURE:  No acute findings. 
LYMPH NODES:  Unremarkable.  No lymphadenopathy. 
LUNG APICES:  Unremarkable as visualized. 
 
ORDER #: [id number] CT/Soft Tissue Neck WITH Contrast  
IMPRESSION:  
 
  
1.  Marked enlargement of the palatine tonsils and prominent soft tissue  
swelling of the hypopharyngeal structures predominantly on the right with  
some mass effect on the hypopharyngeal airway. This may all be due to  
infection. A hypopharyngeal mass cannot be completely excluded. Recommend  
follow-up.  
 
  
2.  No cervical lymphadenopathy.  
 
  
N.B. : The above Results were Read Back by Chris De Jesus MD to Stan Ortiz DO, and understanding confirmed on 03/15/2024 04:51:25 (ET).  
 
  
Electronically Signed:  
Chris De Jesus MD  
2024/03/15 at 4:54 EDT  
Reading Location ID and State: [id number] / [place]  
Tel [phone], Service support  [phone], Fax [phone]

## 2024-03-15 NOTE — PCM.CONS.GEN
Assessment & Plan
Assessment/Plan
(1) Pharyngeal edema: 
PLAN: 
Plan
78 year old male with URI, pharyngeal edema on CT
-currently stable with antibiotics, steroids, BIPAP
-bacterial vs viral - DNI/DNR.  will not scope larynx at this time as he will not be intubated and his laryngeal findings are apparent on CT.

HPI
Consult Data
Date of Consult: 03/15/24
HPI Narrative
Reason for Consultation: CT pharyngeal edema
HPI Narrative: 
JORGE MAYNARD, is a 78 M who presents with pharyngeal wall / hypopharyngeal edema on CT.  Chronic COPD on home O2, recent strep diagnosis s/p antibiotics and recent dyspnea with RSV.  CT neck demonstrated diffuse pharyngeal edema with airway patentcy 
and no abscess.  placed on BIPAP, solumedrol, etc.  hypercapneic, but stable.


[facility]
Medical History (Reviewed 03/15/24 @ 10:38 by Dr. Demetrius Huston MD)

Acute respiratory failure, unspecified whether with hypoxia or hypercapnia
Adult failure to thrive
Anxiety
Asthma
Back pain
Chronic obstructive lung disease
Colon cancer
Confusion
COPD (chronic obstructive pulmonary disease)
COPD (chronic obstructive pulmonary disease)
Dementia
Depression
Dysphagia, oropharyngeal phase
Emphysema, interstitial
Former smoker
Gastroesophageal reflux disease
History of colon cancer
History of colonic diverticulitis
HTN (hypertension)
Hyperlipidemia
Hypertension
Insomnia, unspecified
Obstructive sleep apnea (adult) (pediatric)
Osteoarthritis
Paroxysmal atrial fibrillation
Primary osteoarthritis, right shoulder
Right shoulder pain


Home Medications

tamsulosin 0.4 mg capsule 0.4 mg PO QHS urination 10/31/13 [History Last Taken 08/16/21]
pramipexole 0.25 mg tablet 0.25 mg PO QHS restless legs 11/21/16 [History Last Taken 08/16/21]
albuterol sulfate 2.5 mg/3 mL (0.083 %) solution for nebulization 2.5 mg inhalation Q4H PRN PRN copd 05/26/19 [History Last Taken 08/14/21]
albuterol sulfate 90 mcg/actuation aerosol inhaler (ProAir HFA) 2 puff PO Q6H PRN Shortness Of Breath 05/26/19 [History Last Taken Unknown]
pantoprazole 40 mg tablet,delayed release 40 mg PO DAILY gerd 05/26/19 [History Last Taken 08/16/21]
roflumilast 250 mcg tablet (Daliresp) 250 mcg PO BID copd 05/26/19 [History Last Taken 08/16/21]
sertraline 100 mg tablet (Zoloft) 150 mg PO QHS depression 05/26/19 [History Last Taken 08/16/21]
ergocalciferol (vitamin D2) 1,250 mcg (50,000 unit) capsule 50,000 unit PO [facility] 12/08/19 [History Last Taken 08/13/21]
fluticasone fur. 100 mcg-umeclid 62.5 mcg-vilant 25 mcg inhalat.powder (Trelegy Ellipta) 1 inh inhalation DAILY COPD 08/18/21 [History Last Taken 08/16/21]
atorvastatin 10 mg tablet 10 mg PO QHS 08/12/22 [History Last Taken Unknown]
furosemide 40 mg tablet (Lasix) 40 mg PO DAILY 08/12/22 [History Last Taken Unknown]
psyllium 1 packet PO DAILY 08/12/22 [History Last Taken Unknown]
acetazolamide 250 mg tablet 250 mg PO BID 10/10/23 [History Last Taken Unknown]
apixaban 5 mg tablet (Eliquis) 5 mg PO BID 10/10/23 [History Last Taken Unknown]
clotrimazole-betamethasone 1 %-0.05 % topical cream 1 applic topical BID 10/10/23 [History Last Taken Unknown]
metoprolol tartrate 50 mg tablet 50 mg PO Q12H 10/10/23 [History Last Taken Unknown]
ferrous sulfate 325 mg (65 mg iron) tablet (FeroSul) 325 mg PO BID 12/01/23 [History Last Taken Unknown]
acetaminophen 325 mg tablet 650 mg (2 x 325 mg) PO Q6H PRN PRN Pain 1-10 Or Fever >100.7 #0 tabs 12/05/23 [Rx Last Taken Unknown]
azithromycin 500 mg tablet (Zithromax) 500 mg PO DAILY 3 days #3 tabs 12/05/23 [Rx Last Taken Unknown]
food supplemt, lactose-reduced 0.08 gram-1.5 kcal/mL oral liquid (Ensure Plus High Protein) 120 ml PO TIDCM #0 mL 12/05/23 [Rx Last Taken Unknown]
guaifenesin 1,200 mg tablet, extended release 12 hr (Mucus Relief ER) 1,200 mg PO BID #0 tabs 12/05/23 [Rx Last Taken Unknown]
ipratropium 0.5 mg-albuterol 3 mg (2.5 mg base)/3 mL nebulization soln 3 ml inhalation Q4H.RT #0 mL 12/05/23 [Rx Last Taken Unknown]
melatonin 3 mg tablet 3 mg PO QHS PRN PRN Insomnia #0 tabs 12/05/23 [Rx Last Taken Unknown]
sennosides 8.6 mg-docusate sodium 50 mg tablet (Stool Softener-Stimulant Laxative) 2 tab PO BID PRN Constipation #0 tabs 12/05/23 [Rx Last Taken Unknown]
prednisone 20 mg tablet 40 mg PO DAILY 03/15/24 [History Last Taken Unknown]




Allergy/AdvReac Type Severity Reaction Status Date / Time
trimethoprim [From Bactrim] Allergy Intermediate hot and Verified 03/15/24 02:06
   diaphoretic  
Sulfa (Sulfonamide Allergy  Rash Verified 03/15/24 02:06
Antibiotics)     


Family History (Reviewed 03/15/24 @ 10:38 by Dr. Demetrius Huston MD)
Mother
 Diabetes
 Hypertension
 Cancer


Surgical History (Reviewed 03/15/24 @ 10:38 by Dr. Demetrius Huston MD)

History of incisional hernia repair
S/P colectomy
S/P hemorrhoidectomy


Social History (Reviewed 03/15/24 @ 10:38 by Dr. Demetrius Huston MD)
Smoking Status:  Former smoker 



Physical Exam
Const
Constitutional Narrative: 
on BIPAP, sleeping
General Appearance: cooperative
HEENT
HEENT Narrative: 
soft palate normal.
Face and Sinus: normal facial exam
Nose: external nose normal

Lab / Micro Data

03/15/24 03:11          

03/15/24 03:11          

Labs: 
Laboratory Results - last 24 hr

03/15/24 03:11: WBC 19.0 H, RBC 4.11 L, Hgb 11.9 L, Hct 41.0, MCV 99.8 H, MCH 29.0, MCHC 29.0 L, RDW Std Deviation 46.6 H, RDW Coeff of Regi 12.8, Plt Count 194, MPV 10.9, Immature Gran % (Auto) 0.500, Neut % (Auto) 80.5 H, Lymph % (Auto) 9.4 L, Mono 
% (Auto) 9.3, Eos % (Auto) 0.1, Baso % (Auto) 0.2, Absolute Neuts (auto) 15.3 H, Absolute Lymphs (auto) 1.79, Nucleated RBC % 0, Differential Comment SCANNED, Diff Path Review Reviewed, Sodium 141, Potassium 5.3 H, Chloride 105, Carbon Dioxide 33.0 
H, Anion Gap 3 L, BUN 19 H, Creatinine 0.78, Estim Creat Clear Calc 97.47, Est GFR (MDRD) Af Amer 124, Est GFR (MDRD) Non-Af 103, BUN/Creatinine Ratio 24.4 H, Glucose 120 H, Calcium 8.9, Magnesium 1.8, B-Natriuretic Peptide 140.1 H, Procalcitonin 
0.07
03/15/24 05:24: Lactic Acid 0.5
03/15/24 09:45: Lactic Acid 0.6


Micro: 
Microbiology

03/15/24 02:18   Mucosa - Nose   SARS-CoV-2, Influenza & RSV (PCR) - Final
                            RSV
03/15/24 02:18   Mucosa - Throat   Streptococcus pyogenes (PCR) - Final


ABG Data
ABG results: 
ABG

  03/15/24 03/15/24 03/15/24
  04:25 05:40 08:33
Specimen Type  ART  ART  ART
Sample Site  L Radial  L Radial  L Radial
pH  7.25 L  7.23 L  7.24 L
Bicarbonate Actual  36.7 H  37.9 H  36.3 H
Total CO2  39  41  39
Base Excess  9 H  10 H  9 H
O2 Saturation  78 L  94 L  92 L
O2 %  4.0  50.0  40.0
ABG pCO2  84.5 H*  91.3 H*  85.5 H*
ABG pO2  52 L  87  81
Tra Test  Positive  Positive  Positive
Respiration Rate   12  14
O2 Delivery Device  Cannula  BiPAP  BiPAP
Vent Mode  Not entered  NIV  Not entered
Tidal Volume   500.0  500.0
POC PEEP   8  8
Crit Call To/Read Back  Yes  Yes  Yes
Blood Gas Notified Whom  Angel Walsh
Blood Gas Notified Time  04:26:53  05:42:09  08:35:18
Clinical Comments     AVAPS


Imaging
Radiology Impression

Soft Tissue Neck CT  03/15/24 02:52
IMPRESSION:
 
1.  Marked enlargement of the palatine tonsils and prominent soft tissue
swelling of the hypopharyngeal structures predominantly on the right with
some mass effect on the hypopharyngeal airway. This may all be due to
infection. A hypopharyngeal mass cannot be completely excluded. Recommend
follow-up.
 
2.  No cervical lymphadenopathy.
 
N.B. : The above Results were Read Back by Chris De Jesus MD to Stan Ortiz DO, and understanding confirmed on 03/15/2024 04:51:25 (ET).
 
Electronically Signed:
Chris De Jesus MD
2024/03/15 at 4:54 EDT
Reading Location ID and State: [id number] / [place]
Tel [phone], Service support  [phone], Fax [phone]
 

ADDENDUM: 03/15/24 0501
IMPRESSION:
 
1.  Marked enlargement of the palatine tonsils and prominent soft tissue
swelling of the hypopharyngeal structures predominantly on the right with
some mass effect on the hypopharyngeal airway. This may all be due to
infection. A hypopharyngeal mass cannot be completely excluded. Recommend
follow-up.
 
2.  No cervical lymphadenopathy.
 
N.B. : The above Results were Read Back by Chris De Jesus MD to Stan Ortiz DO, and understanding confirmed on 03/15/2024 04:51:25 (ET).
 
Electronically Signed:
Chris De Jesus MD
2024/03/15 at 4:54 EDT
Reading Location ID and State: [id number] / [place]
Tel [phone], Service support  [phone], Fax [phone]
 


Chest X-Ray  03/15/24 03:45
IMPRESSION:
 
1.  Coarse interstitial disease in the lung bases bilaterally likely due to
pulmonary fibrosis unchanged since previous exam.
 
2.  Borderline cardiomegaly.
 
Electronically Signed:
Chris De Jesus MD
2024/03/15 at 4:41 EDT
Reading Location ID and State: [id number] / [place]
Tel [phone], Service support  [phone], Fax [phone]

## 2024-03-15 NOTE — CON.PCM_ITS
Assessment & Plan    
Assessment/Plan    
(1) Pharyngeal edema:     
PLAN:     
Plan    
78 year old male with URI, pharyngeal edema on CT    
-currently stable with antibiotics, steroids, BIPAP    
-bacterial vs viral - DNI/DNR.  will not scope larynx at this time as he will   
not be intubated and his laryngeal findings are apparent on CT.    
    
HPI    
Consult Data    
Date of Consult: 03/15/24    
HPI Narrative    
Reason for Consultation: CT pharyngeal edema    
HPI Narrative:     
JORGE MAYNARD, is a 78 M who presents with pharyngeal wall / hypopharyngeal edema  
on CT.  Chronic COPD on home O2, recent strep diagnosis s/p antibiotics and   
recent dyspnea with RSV.  CT neck demonstrated diffuse pharyngeal edema with   
airway patentcy and no abscess.  placed on BIPAP, solumedrol, etc.  hypercap  
neic, but stable.    
    
    
[facility]    
Medical History (Reviewed 03/15/24 @ 10:38 by Dr. Demetrius Huston MD)    
    
Acute respiratory failure, unspecified whether with hypoxia or hypercapnia    
Adult failure to thrive    
Anxiety    
Asthma    
Back pain    
Chronic obstructive lung disease    
Colon cancer    
Confusion    
COPD (chronic obstructive pulmonary disease)    
COPD (chronic obstructive pulmonary disease)    
Dementia    
Depression    
Dysphagia, oropharyngeal phase    
Emphysema, interstitial    
Former smoker    
Gastroesophageal reflux disease    
History of colon cancer    
History of colonic diverticulitis    
HTN (hypertension)    
Hyperlipidemia    
Hypertension    
Insomnia, unspecified    
Obstructive sleep apnea (adult) (pediatric)    
Osteoarthritis    
Paroxysmal atrial fibrillation    
Primary osteoarthritis, right shoulder    
Right shoulder pain    
    
    
                                Home Medications    
    
tamsulosin 0.4 mg capsule 0.4 mg PO QHS urination 10/31/13 [History Last Taken   
08/16/21]    
pramipexole 0.25 mg tablet 0.25 mg PO QHS restless legs 11/21/16 [History Last   
Taken 08/16/21]    
albuterol sulfate 2.5 mg/3 mL (0.083 %) solution for nebulization 2.5 mg   
inhalation Q4H PRN PRN copd 05/26/19 [History Last Taken 08/14/21]    
albuterol sulfate 90 mcg/actuation aerosol inhaler (ProAir HFA) 2 puff PO Q6H   
PRN Shortness Of Breath 05/26/19 [History Last Taken Unknown]    
pantoprazole 40 mg tablet,delayed release 40 mg PO DAILY gerd 05/26/19 [History   
Last Taken 08/16/21]    
roflumilast 250 mcg tablet (Daliresp) 250 mcg PO BID copd 05/26/19 [History Last  
 Taken 08/16/21]    
sertraline 100 mg tablet (Zoloft) 150 mg PO QHS depression 05/26/19 [History   
Last Taken 08/16/21]    
ergocalciferol (vitamin D2) 1,250 mcg (50,000 unit) capsule 50,000 unit PO [facility] 12/08/19 [History Last Taken 08/13/21]    
fluticasone fur. 100 mcg-umeclid 62.5 mcg-vilant 25 mcg inhalat.powder (Trelegy   
Ellipta) 1 inh inhalation DAILY COPD 08/18/21 [History Last Taken 08/16/21]    
atorvastatin 10 mg tablet 10 mg PO QHS 08/12/22 [History Last Taken Unknown]    
furosemide 40 mg tablet (Lasix) 40 mg PO DAILY 08/12/22 [History Last Taken   
Unknown]    
psyllium 1 packet PO DAILY 08/12/22 [History Last Taken Unknown]    
acetazolamide 250 mg tablet 250 mg PO BID 10/10/23 [History Last Taken Unknown]    
apixaban 5 mg tablet (Eliquis) 5 mg PO BID 10/10/23 [History Last Taken Unknown]    
clotrimazole-betamethasone 1 %-0.05 % topical cream 1 applic topical BID   
10/10/23 [History Last Taken Unknown]    
metoprolol tartrate 50 mg tablet 50 mg PO Q12H 10/10/23 [History Last Taken   
Unknown]    
ferrous sulfate 325 mg (65 mg iron) tablet (FeroSul) 325 mg PO BID 12/01/23   
[History Last Taken Unknown]    
acetaminophen 325 mg tablet 650 mg (2 x 325 mg) PO Q6H PRN PRN Pain 1-10 Or   
Fever >100.7 #0 tabs 12/05/23 [Rx Last Taken Unknown]    
azithromycin 500 mg tablet (Zithromax) 500 mg PO DAILY 3 days #3 tabs 12/05/23   
[Rx Last Taken Unknown]    
food supplemt, lactose-reduced 0.08 gram-1.5 kcal/mL oral liquid (Ensure Plus   
High Protein) 120 ml PO TIDCM #0 mL 12/05/23 [Rx Last Taken Unknown]    
guaifenesin 1,200 mg tablet, extended release 12 hr (Mucus Relief ER) 1,200 mg   
PO BID #0 tabs 12/05/23 [Rx Last Taken Unknown]    
ipratropium 0.5 mg-albuterol 3 mg (2.5 mg base)/3 mL nebulization soln 3 ml   
inhalation Q4H.RT #0 mL 12/05/23 [Rx Last Taken Unknown]    
melatonin 3 mg tablet 3 mg PO QHS PRN PRN Insomnia #0 tabs 12/05/23 [Rx Last   
Taken Unknown]    
sennosides 8.6 mg-docusate sodium 50 mg tablet (Stool Softener-Stimulant   
Laxative) 2 tab PO BID PRN Constipation #0 tabs 12/05/23 [Rx Last Taken Unknown]    
prednisone 20 mg tablet 40 mg PO DAILY 03/15/24 [History Last Taken Unknown]    
    
    
                                            
    
    
    
Allergy/AdvReac Type Severity Reaction Status Date / Time    
     
trimethoprim [From Bactrim] Allergy Intermediate hot and Verified 03/15/24 02:06    
    
   diaphoretic      
     
Sulfa (Sulfonamide Allergy  Rash Verified 03/15/24 02:06    
    
Antibiotics)         
    
    
    
Family History (Reviewed 03/15/24 @ 10:38 by Dr. Demetrius Huston MD)    
Mother   Diabetes    
   Hypertension    
   Cancer    
    
    
Surgical History (Reviewed 03/15/24 @ 10:38 by Dr. Demetrius Huston MD)    
    
History of incisional hernia repair    
S/P colectomy    
S/P hemorrhoidectomy    
    
    
Social History (Reviewed 03/15/24 @ 10:38 by Dr. Demetrius Huston MD)    
Smoking Status:  Former smoker     
    
    
    
Physical Exam    
Const    
Constitutional Narrative:     
on BIPAP, sleeping    
General Appearance: cooperative    
HEENT    
HEENT Narrative:     
soft palate normal.    
Face and Sinus: normal facial exam    
Nose: external nose normal    
    
Lab / Micro Data    
    
                                                        03/15/24 03:11              
    
                                                        03/15/24 03:11              
    
Labs:     
                         Laboratory Results - last 24 hr    
    
03/15/24 03:11: WBC 19.0 H, RBC 4.11 L, Hgb 11.9 L, Hct 41.0, MCV 99.8 H, MCH   
29.0, MCHC 29.0 L, RDW Std Deviation 46.6 H, RDW Coeff of Regi 12.8, Plt Count   
194, MPV 10.9, Immature Gran % (Auto) 0.500, Neut % (Auto) 80.5 H, Lymph %   
(Auto) 9.4 L, Mono % (Auto) 9.3, Eos % (Auto) 0.1, Baso % (Auto) 0.2, Absolute   
Neuts (auto) 15.3 H, Absolute Lymphs (auto) 1.79, Nucleated RBC % 0,   
Differential Comment SCANNED, Diff Path Review Reviewed, Sodium 141, Potassium   
5.3 H, Chloride 105, Carbon Dioxide 33.0 H, Anion Gap 3 L, BUN 19 H, Creatinine   
0.78, Estim Creat Clear Calc 97.47, Est GFR (MDRD) Af Amer 124, Est GFR (MDRD)   
Non-Af 103, BUN/Creatinine Ratio 24.4 H, Glucose 120 H, Calcium 8.9, Magnesium   
1.8, B-Natriuretic Peptide 140.1 H, Procalcitonin 0.07    
03/15/24 05:24: Lactic Acid 0.5    
03/15/24 09:45: Lactic Acid 0.6    
    
    
Micro:     
                                  Microbiology    
    
03/15/24 02:18   Mucosa - Nose   SARS-CoV-2, Influenza & RSV (PCR) - Final    
                            RSV    
03/15/24 02:18   Mucosa - Throat   Streptococcus pyogenes (PCR) - Final    
    
    
ABG Data    
ABG results:     
                                       ABG    
    
    
    
  03/15/24 03/15/24 03/15/24    
    
  04:25 05:40 08:33    
     
Specimen Type  ART  ART  ART    
     
Sample Site  L Radial  L Radial  L Radial    
     
pH  7.25 L  7.23 L  7.24 L    
     
Bicarbonate Actual  36.7 H  37.9 H  36.3 H    
     
Total CO2  39  41  39    
     
Base Excess  9 H  10 H  9 H    
     
O2 Saturation  78 L  94 L  92 L    
     
O2 %  4.0  50.0  40.0    
     
ABG pCO2  84.5 H*  91.3 H*  85.5 H*    
     
ABG pO2  52 L  87  81    
     
Tra Test  Positive  Positive  Positive    
     
Respiration Rate   12  14    
     
O2 Delivery Device  Cannula  BiPAP  BiPAP    
     
Vent Mode  Not entered  NIV  Not entered    
     
Tidal Volume   500.0  500.0    
     
POC PEEP   8  8    
     
Crit Call To/Read Back  Yes  Yes  Yes    
     
Blood Gas Notified Whom  Angel Walsh    
     
Blood Gas Notified Time  04:26:53  05:42:09  08:35:18    
     
Clinical Comments     AVAPS    
    
    
    
Imaging    
                              Radiology Impression    
    
Soft Tissue Neck CT  03/15/24 02:52    
IMPRESSION:    
     
1.  Marked enlargement of the palatine tonsils and prominent soft tissue    
swelling of the hypopharyngeal structures predominantly on the right with    
some mass effect on the hypopharyngeal airway. This may all be due to    
infection. A hypopharyngeal mass cannot be completely excluded. Recommend    
follow-up.    
     
2.  No cervical lymphadenopathy.    
     
N.B. : The above Results were Read Back by Chris De Jesus MD to Stan Ortiz DO, and understanding confirmed on 03/15/2024 04:51:25 (ET).    
     
Electronically Signed:    
Chris De Jesus MD    
2024/03/15 at 4:54 EDT    
Reading Location ID and State: [id number] / [place]    
Tel [phone], Service support  [phone], Fax [phone]    
     
    
ADDENDUM: 03/15/24 0501    
IMPRESSION:    
     
1.  Marked enlargement of the palatine tonsils and prominent soft tissue    
swelling of the hypopharyngeal structures predominantly on the right with    
some mass effect on the hypopharyngeal airway. This may all be due to    
infection. A hypopharyngeal mass cannot be completely excluded. Recommend    
follow-up.    
     
2.  No cervical lymphadenopathy.    
     
N.B. : The above Results were Read Back by Chris De Jesus MD to Stan Ortiz DO, and understanding confirmed on 03/15/2024 04:51:25 (ET).    
     
Electronically Signed:    
Chris De Jesus MD    
2024/03/15 at 4:54 EDT    
Reading Location ID and State: [address] / [place]    
Tel [phone], Service support  [phone], Fax [phone]    
     
    
    
Chest X-Ray  03/15/24 03:45    
IMPRESSION:    
     
1.  Coarse interstitial disease in the lung bases bilaterally likely due to    
pulmonary fibrosis unchanged since previous exam.    
     
2.  Borderline cardiomegaly.    
     
Electronically Signed:    
Chris De Jesus MD    
2024/03/15 at 4:41 EDT    
Reading Location ID and State: [id number] / [place]    
Tel [phone], Service support  [phone], Fax [phone]

## 2024-03-15 NOTE — PN.HOSP_ITS
Hospitalist Note    
Patient is a 78-year-old male resident of the [place] who has a baseline oxygen   
requirement of 3 L due to COPD who presented to the emergency department at   
[facility] early this morning and was admitted about 6 AM with 2  
to 3 days of increased cough and congestion.  He also reported that he had not   
been able to swallow and despite wearing his oxygen and taking his medications   
he felt like he was having worsening shortness of breath.  Due to this the   
nursing facility sent him in for evaluation.  Evidently, strep throat is going   
around the facility which she resides and he was diagnosed with strep throat and  
placed on antibiotics but did not improve.  Initially on presentation he was   
afebrile with a temperature of 99, blood pressure was 132/68, respiratory was 22  
and oxygen saturation was 93% on his baseline 3 L.  His respiratory status   
slowly declined and he desatted to 83% on 6 L at which time he was placed on   
BiPAP.  His CBC shows a marked leukocytosis with a white count of 19.0.  He does  
have a left shift with an 80.5% neutrophilia.  His ABG initially showed a pH of   
7.23 with a pO2 of 87 and a pCO2 of 91.3.  His chemistry panel showed   
hyperkalemia with a potassium of 5.3 likely induced from his acidosis, normal   
renal function, slightly elevated glucose at 120, normal lactic acid and a   
slightly elevated BNP and 140.1.  His procalcitonin was 0.07.  RSV PCR was   
positive.  Chest x-ray was overtly unremarkable.  CT of the neck soft tissues   
demonstrated marked enlargement of the palate teen tonsils and prominent soft   
tissue swelling of the hypopharyngeal structures predominantly on the right with  
some mass effect on the hypopharyngeal airway with suspected infection however   
mass could not be explored deluded.  No cervical lymphadenopathy was identified.  
 The patient was given Decadron in the emergency department and then transition   
to Solu-Medrol to treat both his tonsillar edema and acute COPD exacerbation, he  
was placed on broad-spectrum antibiotics until cultures result and maintained on  
BiPAP.  ENT and critical care medicine have been consulted.  The patient is a   
DNR CCA with no intubation.    
    
    
Diagnoses:    
Acute on chronic hypoxic and hypercapnic respiratory failure secondary to RSV   
infection    
Tonsillar swelling--> mass versus infection    
Hyperkalemia secondary to acidosis    
Respiratory acidosis    
Acute RSV infection    
Acute exacerbation of COPD    
Leukocytosis    
Chronic stable anemia    
Elevated BNP    
GERD    
Hyperlipidemia    
Hypertension    
DAVONTE    
PAF    
Generalized weakness and debility    
Depression    
History of tobacco abuse

## 2024-03-15 NOTE — EDS_ITS
HPI    
History of Present Illness    
Chief Complaint: Shortness of Breath    
Informant: patient and EMS    
Narrative    
Narrative:     
Patient is a 78-year-old male with past medical history of COPD on chronic 3 L   
of oxygen as well as hypertension.  He states that for the last 2 or 3 days he   
has had increased congestion and cough.  He reports that he also has not had the  
ability to swallow.  He states that despite wearing his oxygen and taking his   
medications he feels like his shortness of breath is worsening and secondary to   
his nursing home sent him in for evaluation    
    
[facility]    
Medical History (Updated 03/15/24 @ 07:38 by Dr. Stan Ortiz, )    
    
Acute respiratory failure, unspecified whether with hypoxia or hypercapnia    
Adult failure to thrive    
Anxiety    
Asthma    
Back pain    
Chronic obstructive lung disease    
Colon cancer    
Confusion    
COPD (chronic obstructive pulmonary disease)    
COPD (chronic obstructive pulmonary disease)    
Dementia    
Depression    
Dysphagia, oropharyngeal phase    
Emphysema, interstitial    
Former smoker    
Gastroesophageal reflux disease    
History of colon cancer    
History of colonic diverticulitis    
HTN (hypertension)    
Hyperlipidemia    
Hypertension    
Insomnia, unspecified    
Obstructive sleep apnea (adult) (pediatric)    
Osteoarthritis    
Paroxysmal atrial fibrillation    
Primary osteoarthritis, right shoulder    
Right shoulder pain    
    
    
                                Home Medications    
    
tamsulosin 0.4 mg capsule 0.4 mg PO QHS urination 10/31/13 [History Last Taken   
08/16/21]    
pramipexole 0.25 mg tablet 0.25 mg PO QHS restless legs 11/21/16 [History Last   
Taken 08/16/21]    
albuterol sulfate 2.5 mg/3 mL (0.083 %) solution for nebulization 2.5 mg   
inhalation Q4H PRN PRN copd 05/26/19 [History Last Taken 08/14/21]    
albuterol sulfate 90 mcg/actuation aerosol inhaler (ProAir HFA) 2 puff PO Q6H   
PRN Shortness Of Breath 05/26/19 [History Last Taken Unknown]    
pantoprazole 40 mg tablet,delayed release 40 mg PO DAILY gerd 05/26/19 [History   
Last Taken 08/16/21]    
roflumilast 250 mcg tablet (Daliresp) 250 mcg PO BID copd 05/26/19 [History Last  
 Taken 08/16/21]    
sertraline 100 mg tablet (Zoloft) 150 mg PO QHS depression 05/26/19 [History   
Last Taken 08/16/21]    
ergocalciferol (vitamin D2) 1,250 mcg (50,000 unit) capsule 50,000 unit PO [facility] 12/08/19 [History Last Taken 08/13/21]    
fluticasone fur. 100 mcg-umeclid 62.5 mcg-vilant 25 mcg inhalat.powder (Trelegy   
Ellipta) 1 inh inhalation DAILY COPD 08/18/21 [History Last Taken 08/16/21]    
atorvastatin 10 mg tablet 10 mg PO QHS 08/12/22 [History Last Taken Unknown]    
furosemide 40 mg tablet (Lasix) 40 mg PO DAILY 08/12/22 [History Last Taken   
Unknown]    
psyllium 1 packet PO DAILY 08/12/22 [History Last Taken Unknown]    
acetazolamide 250 mg tablet 250 mg PO BID 10/10/23 [History Last Taken Unknown]    
apixaban 5 mg tablet (Eliquis) 5 mg PO BID 10/10/23 [History Last Taken Unknown]    
clotrimazole-betamethasone 1 %-0.05 % topical cream 1 applic topical BID   
10/10/23 [History Last Taken Unknown]    
metoprolol tartrate 50 mg tablet 50 mg PO Q12H 10/10/23 [History Last Taken   
Unknown]    
ferrous sulfate 325 mg (65 mg iron) tablet (FeroSul) 325 mg PO BID 12/01/23   
[History Last Taken Unknown]    
acetaminophen 325 mg tablet 650 mg (2 x 325 mg) PO Q6H PRN PRN Pain 1-10 Or Feve

## 2024-03-16 VITALS — OXYGEN SATURATION: 90 % | HEART RATE: 110 BPM | RESPIRATION RATE: 20 BRPM

## 2024-03-16 VITALS
SYSTOLIC BLOOD PRESSURE: 148 MMHG | DIASTOLIC BLOOD PRESSURE: 81 MMHG | RESPIRATION RATE: 20 BRPM | OXYGEN SATURATION: 91 % | HEART RATE: 98 BPM

## 2024-03-16 VITALS
HEART RATE: 69 BPM | DIASTOLIC BLOOD PRESSURE: 69 MMHG | RESPIRATION RATE: 19 BRPM | SYSTOLIC BLOOD PRESSURE: 137 MMHG | OXYGEN SATURATION: 97 %

## 2024-03-16 VITALS
RESPIRATION RATE: 27 BRPM | SYSTOLIC BLOOD PRESSURE: 146 MMHG | TEMPERATURE: 98.24 F | DIASTOLIC BLOOD PRESSURE: 80 MMHG | HEART RATE: 118 BPM | OXYGEN SATURATION: 94 %

## 2024-03-16 VITALS
RESPIRATION RATE: 27 BRPM | DIASTOLIC BLOOD PRESSURE: 80 MMHG | OXYGEN SATURATION: 90 % | HEART RATE: 95 BPM | SYSTOLIC BLOOD PRESSURE: 136 MMHG

## 2024-03-16 VITALS — HEART RATE: 129 BPM | DIASTOLIC BLOOD PRESSURE: 65 MMHG | SYSTOLIC BLOOD PRESSURE: 134 MMHG | RESPIRATION RATE: 22 BRPM

## 2024-03-16 VITALS — OXYGEN SATURATION: 94 % | HEART RATE: 114 BPM | RESPIRATION RATE: 22 BRPM

## 2024-03-16 VITALS
OXYGEN SATURATION: 91 % | TEMPERATURE: 97.34 F | SYSTOLIC BLOOD PRESSURE: 153 MMHG | HEART RATE: 106 BPM | RESPIRATION RATE: 27 BRPM | DIASTOLIC BLOOD PRESSURE: 82 MMHG

## 2024-03-16 VITALS — OXYGEN SATURATION: 94 % | RESPIRATION RATE: 22 BRPM | HEART RATE: 71 BPM

## 2024-03-16 VITALS — OXYGEN SATURATION: 89 %

## 2024-03-16 VITALS
HEART RATE: 69 BPM | OXYGEN SATURATION: 95 % | RESPIRATION RATE: 24 BRPM | DIASTOLIC BLOOD PRESSURE: 71 MMHG | SYSTOLIC BLOOD PRESSURE: 137 MMHG

## 2024-03-16 VITALS
OXYGEN SATURATION: 91 % | DIASTOLIC BLOOD PRESSURE: 70 MMHG | RESPIRATION RATE: 19 BRPM | SYSTOLIC BLOOD PRESSURE: 133 MMHG | HEART RATE: 89 BPM

## 2024-03-16 VITALS — SYSTOLIC BLOOD PRESSURE: 152 MMHG | HEART RATE: 101 BPM | DIASTOLIC BLOOD PRESSURE: 84 MMHG

## 2024-03-16 VITALS
SYSTOLIC BLOOD PRESSURE: 142 MMHG | OXYGEN SATURATION: 95 % | DIASTOLIC BLOOD PRESSURE: 74 MMHG | HEART RATE: 67 BPM | RESPIRATION RATE: 24 BRPM

## 2024-03-16 VITALS — OXYGEN SATURATION: 92 %

## 2024-03-16 VITALS — RESPIRATION RATE: 23 BRPM | HEART RATE: 103 BPM

## 2024-03-16 VITALS
TEMPERATURE: 98 F | DIASTOLIC BLOOD PRESSURE: 77 MMHG | HEART RATE: 90 BPM | OXYGEN SATURATION: 92 % | SYSTOLIC BLOOD PRESSURE: 147 MMHG | RESPIRATION RATE: 25 BRPM

## 2024-03-16 VITALS
SYSTOLIC BLOOD PRESSURE: 97 MMHG | OXYGEN SATURATION: 92 % | HEART RATE: 106 BPM | DIASTOLIC BLOOD PRESSURE: 70 MMHG | RESPIRATION RATE: 22 BRPM

## 2024-03-16 VITALS — OXYGEN SATURATION: 92 % | RESPIRATION RATE: 24 BRPM | HEART RATE: 85 BPM

## 2024-03-16 VITALS
HEART RATE: 129 BPM | SYSTOLIC BLOOD PRESSURE: 167 MMHG | RESPIRATION RATE: 25 BRPM | TEMPERATURE: 99.68 F | DIASTOLIC BLOOD PRESSURE: 89 MMHG | OXYGEN SATURATION: 50 %

## 2024-03-16 VITALS
DIASTOLIC BLOOD PRESSURE: 89 MMHG | OXYGEN SATURATION: 92 % | SYSTOLIC BLOOD PRESSURE: 150 MMHG | HEART RATE: 108 BPM | RESPIRATION RATE: 24 BRPM

## 2024-03-16 VITALS — SYSTOLIC BLOOD PRESSURE: 146 MMHG | DIASTOLIC BLOOD PRESSURE: 70 MMHG | RESPIRATION RATE: 27 BRPM | HEART RATE: 106 BPM

## 2024-03-16 VITALS
OXYGEN SATURATION: 91 % | TEMPERATURE: 98 F | HEART RATE: 91 BPM | SYSTOLIC BLOOD PRESSURE: 145 MMHG | RESPIRATION RATE: 20 BRPM | DIASTOLIC BLOOD PRESSURE: 76 MMHG

## 2024-03-16 VITALS
OXYGEN SATURATION: 93 % | DIASTOLIC BLOOD PRESSURE: 84 MMHG | RESPIRATION RATE: 16 BRPM | TEMPERATURE: 97.7 F | HEART RATE: 107 BPM | SYSTOLIC BLOOD PRESSURE: 152 MMHG

## 2024-03-16 VITALS — DIASTOLIC BLOOD PRESSURE: 80 MMHG | HEART RATE: 104 BPM | SYSTOLIC BLOOD PRESSURE: 136 MMHG

## 2024-03-16 VITALS
HEART RATE: 98 BPM | OXYGEN SATURATION: 92 % | SYSTOLIC BLOOD PRESSURE: 152 MMHG | RESPIRATION RATE: 21 BRPM | DIASTOLIC BLOOD PRESSURE: 85 MMHG

## 2024-03-16 VITALS
SYSTOLIC BLOOD PRESSURE: 140 MMHG | HEART RATE: 111 BPM | RESPIRATION RATE: 22 BRPM | DIASTOLIC BLOOD PRESSURE: 79 MMHG | OXYGEN SATURATION: 94 %

## 2024-03-16 VITALS
SYSTOLIC BLOOD PRESSURE: 136 MMHG | HEART RATE: 84 BPM | RESPIRATION RATE: 21 BRPM | OXYGEN SATURATION: 93 % | DIASTOLIC BLOOD PRESSURE: 71 MMHG

## 2024-03-16 VITALS
DIASTOLIC BLOOD PRESSURE: 82 MMHG | OXYGEN SATURATION: 90 % | SYSTOLIC BLOOD PRESSURE: 148 MMHG | RESPIRATION RATE: 23 BRPM | HEART RATE: 89 BPM

## 2024-03-16 VITALS
RESPIRATION RATE: 26 BRPM | HEART RATE: 88 BPM | DIASTOLIC BLOOD PRESSURE: 72 MMHG | SYSTOLIC BLOOD PRESSURE: 142 MMHG | OXYGEN SATURATION: 91 %

## 2024-03-16 VITALS
DIASTOLIC BLOOD PRESSURE: 74 MMHG | RESPIRATION RATE: 26 BRPM | SYSTOLIC BLOOD PRESSURE: 140 MMHG | OXYGEN SATURATION: 92 % | HEART RATE: 80 BPM

## 2024-03-16 VITALS — RESPIRATION RATE: 16 BRPM | HEART RATE: 89 BPM | OXYGEN SATURATION: 91 %

## 2024-03-16 VITALS
DIASTOLIC BLOOD PRESSURE: 82 MMHG | OXYGEN SATURATION: 92 % | SYSTOLIC BLOOD PRESSURE: 146 MMHG | TEMPERATURE: 99.6 F | HEART RATE: 118 BPM | RESPIRATION RATE: 24 BRPM

## 2024-03-16 VITALS — RESPIRATION RATE: 14 BRPM | HEART RATE: 95 BPM | OXYGEN SATURATION: 92 %

## 2024-03-16 VITALS
HEART RATE: 109 BPM | DIASTOLIC BLOOD PRESSURE: 73 MMHG | SYSTOLIC BLOOD PRESSURE: 152 MMHG | RESPIRATION RATE: 24 BRPM | OXYGEN SATURATION: 93 %

## 2024-03-16 LAB
ALANINE AMINOTRANSFER ALT/SGPT: 11 U/L (ref 16–61)
ALBUMIN SERPL-MCNC: 2.6 G/DL (ref 3.2–5)
ALKALINE PHOSPHATASE: 64 U/L (ref 45–117)
ANION GAP: 1 (ref 5–15)
AST(SGOT): 6 U/L (ref 15–37)
BUN SERPL-MCNC: 21 MG/DL (ref 7–18)
BUN/CREAT RATIO: 28.5 RATIO (ref 10–20)
CALCIUM SERPL-MCNC: 8.9 MG/DL (ref 8.5–10.1)
CARBON DIOXIDE: 37 MMOL/L (ref 21–32)
CHLORIDE: 107 MMOL/L (ref 98–107)
DEPRECATED RDW RBC: 46 FL (ref 35.1–43.9)
ERYTHROCYTE [DISTWIDTH] IN BLOOD: 12.8 % (ref 11.6–14.6)
EST GLOM FILT RATE - AFR AMER: 133 ML/MIN (ref 60–?)
ESTIMATED CREATININE CLEARANCE: 96.76 ML/MIN
GLOBULIN: 3.9 G/DL (ref 2.2–4.2)
GLUCOSE: 138 MG/DL (ref 74–106)
HCT VFR BLD AUTO: 40 % (ref 40–54)
HGB BLD-MCNC: 11.6 G/DL (ref 13–16.5)
IMMATURE GRANULOCYTES COUNT: 0.07 X10^3/UL (ref 0–0)
MAGNESIUM: 2 MG/DL (ref 1.6–2.6)
MCV RBC: 98 FL (ref 80–94)
MEAN CORP HGB CONC: 29 G/DL (ref 32–36)
MEAN PLATELET VOL.: 10.3 FL (ref 6.2–12)
NRBC FLAGGED BY ANALYZER: 0 % (ref 0–5)
PLATELET # BLD: 229 K/MM3 (ref 150–450)
POTASSIUM: 3.5 MMOL/L (ref 3.5–5.1)
RBC # BLD AUTO: 4.08 M/MM3 (ref 4.6–6.2)
VANCOMYCIN SERPL-MCNC: 18 UG/ML (ref 0–15)
VANCOMYCIN TROUGH SERPL-MCNC: 20.7 UG/ML (ref 5–15)
WBC # BLD AUTO: 13.4 K/MM3 (ref 4.4–11)

## 2024-03-16 RX ADMIN — PANTOPRAZOLE SODIUM 330 MG: 40 INJECTION, POWDER, FOR SOLUTION INTRAVENOUS at 09:46

## 2024-03-16 RX ADMIN — SODIUM CHLORIDE, PRESERVATIVE FREE 0 ML: 5 INJECTION INTRAVENOUS at 05:21

## 2024-03-16 RX ADMIN — PIPERACILLIN SODIUM AND TAZOBACTAM SODIUM 12.5 GM: 3; .375 INJECTION, POWDER, LYOPHILIZED, FOR SOLUTION INTRAVENOUS at 21:57

## 2024-03-16 RX ADMIN — ALBUTEROL SULFATE 2.5 MG: 2.5 SOLUTION RESPIRATORY (INHALATION) at 11:23

## 2024-03-16 RX ADMIN — VANCOMYCIN HYDROCHLORIDE 167 MG: 1 INJECTION, POWDER, LYOPHILIZED, FOR SOLUTION INTRAVENOUS at 01:37

## 2024-03-16 RX ADMIN — PIPERACILLIN SODIUM AND TAZOBACTAM SODIUM 12.5 GM: 3; .375 INJECTION, POWDER, LYOPHILIZED, FOR SOLUTION INTRAVENOUS at 05:21

## 2024-03-16 RX ADMIN — Medication 3 MG: at 20:07

## 2024-03-16 RX ADMIN — APIXABAN 5 MG: 5 TABLET, FILM COATED ORAL at 20:09

## 2024-03-16 RX ADMIN — PIPERACILLIN SODIUM AND TAZOBACTAM SODIUM 12.5 GM: 3; .375 INJECTION, POWDER, LYOPHILIZED, FOR SOLUTION INTRAVENOUS at 14:20

## 2024-03-16 RX ADMIN — VANCOMYCIN HYDROCHLORIDE 200 MG: 1 INJECTION, SOLUTION INTRAVENOUS at 18:13

## 2024-03-16 NOTE — PHA.PHARE_ITS
Consult    
Antibiotic Management    
Pharmacy has been consulted to manage selected antibiotic: Vancomycin    
Type of Intervention    
Type of Consult: Follow-up    
Labs    
Labs:     
                                            
    
    
    
Sodium  145 mmol/L (136-145)   03/16/24  04:00        
     
Potassium  3.5 mmol/L (3.5-5.1)   03/16/24  04:00        
     
Chloride  107 mmol/L ()   03/16/24  04:00        
     
Carbon Dioxide  37.0 mmol/L (21.0-32.0)  H  03/16/24  04:00        
     
Anion Gap  1  (5-15)  L  03/16/24  04:00        
     
BUN  21 mg/dL (7-18)  H  03/16/24  04:00        
     
Creatinine  0.74 mg/dL (0.70-1.30)   03/16/24  04:00        
     
Est GFR (MDRD) Af Amer  133 mL/min (>60)   03/16/24  04:00        
     
Est GFR (MDRD) Non-Af  110 mL/min (>60)   03/16/24  04:00        
     
BUN/Creatinine Ratio  28.5 RATIO (10-20)  H  03/16/24  04:00        
     
Glucose  138 mg/dL ()  H  03/16/24  04:00        
     
Vancomycin Trough  20.7 ug/mL (5.0-15.0)  H  03/16/24  09:50        
     
Random Vancomycin  18.0 ug/mL (0.0-15.0)  H  03/16/24  14:30        
    
    
    
Microbiology    
Microbiology:     
                                  Microbiology    
    
03/15/24 02:18   Mucosa - Nose   SARS-CoV-2, Influenza & RSV (PCR) - Final    
                            RSV    
03/15/24 02:18   Mucosa - Throat   Streptococcus pyogenes (PCR) - Final    
    
    
Pharmacy Plan for Drug Dosing    
Pharmacy Plan for Drug Dosing:     
Patient had 2 levels drawn today:    
    
#1    
VANCOMYCIN LEVEL RECEIVED    
Current Vancomycin Dose: 1250 mg Q8    
Number of Doses Received: 3    
Vancomycin Level: 20.7 mg/dL    
Hours Since Last Dose: 8    
Renal Function: SCr 0.74 mg/dL, CrCl 96 mL/min    
Renal Function Trend: stable    
Lab/Micro: blood cx pending    
Vancomycin Plan/Comments: 8 hour level is slightly supratherapeutic at 20.7mg/dL  
(goal 15-20mg/dL), will hold next dose and get a random level @ 1600 (3/16/24).    
Pending Level: 3/16/24 @ 1600 - random    
    
#2    
VANCOMYCIN LEVEL RECEIVED    
Current Vancomycin Dose: HELD (LAST DOSE 1250MG 3/16 @ 0137)    
Number of Doses Received: 3    
Vancomycin Level: 18 mg/dL    
Hours Since Last Dose: 13    
Renal Function: SCr 0.74 mg/dL, CrCl 96 mL/min    
Renal Function Trend: stable    
Lab/Micro: blood cx pending    
Vancomycin Plan/Comments: Random level scheduled for 1600 but drawn at 1430. 13   
hour random level is now therapeutic. Will start a decreased dose of 1000mg Q8   
and get a level prior to 4th dose.    
Pending Level: 3/17/24 @ 1530    
    
Pharmacy Service will continue to monitor and adjust dosing as required.

## 2024-03-16 NOTE — PN.HOSP_ITS
Reason for Visit    
Reason for Visit:     
Diagnoses    
    
Other specified viral diseases (03/15/24)    
Other diseases of pharynx (03/15/24)    
Chronic obstructive pulmonary disease with (acute) exacerbation (03/15/24)    
Acute respiratory failure with hypoxia (03/15/24)    
Chronic respiratory failure with hypercapnia (03/15/24)    
Acute and chronic respiratory failure with hypoxia (03/15/24)    
    
    
    
Subjective    
Subjective    
Patient was seen and examined this morning, initially he was on BiPAP this   
morning but was able to be weaned to Airvo, I ordered a diet for the patient.    
ENT consultation yesterday felt that the patient had pharyngeal edema, they   
recommended continuing antibiotics, steroids, and BiPAP as needed.  Etiology was  
not clear, it could be either viral or bacterial.    
    
Objective Data    
Objective Data    
Vital Signs:     
Vital Signs    
    
    
    
Temp Pulse Resp BP Pulse Ox O2 Del Method O2 Flow Rate    
     
 99.6 F H  129 H  22 H  134/65 H  50   Airvo   50     
     
 03/16/24 14:00  03/16/24 15:00  03/16/24 15:00  03/16/24 15:00  03/16/24 14:00   
03/16/24 15:00  03/16/24 15:00    
    
    
    
FiO2    
     
 50     
     
 03/16/24 15:00    
    
    
    
    
Oxygen Flow Rate (L/min)         50                                               
       
Oxygen Delivery Method           Airvo                                            
       
Weight:                          107.6 kg                                         
       
Body Mass Index (BMI)            34.0                                             
       
    
    
Intake & Output:     
                       Intake and Output for Last 24 Hours    
    
    
    
 03/14/24 03/15/24 03/16/24    
    
 23:59 23:59 23:59    
     
Intake Total  1155 / 1155 685 / 685    
     
Output Total  725 / 725 800 / 800    
     
Balance  430 / 430 -115 / -115    
    
    
    
Lab / Micro Data    
    
                                                        03/16/24 04:00              
    
                                                        03/16/24 04:00              
    
Labs:     
                         Laboratory Results - last 24 hr    
    
03/15/24 03:11: Diff Path Review Reviewed    
03/16/24 04:00: WBC 13.4 H, RBC 4.08 L, Hgb 11.6 L, Hct 40.0, MCV 98.0 H, MCH   
28.4, MCHC 29.0 L, RDW Std Deviation 46.0 H, RDW Coeff of Regi 12.8, Plt Count   
229, MPV 10.3, Immature Gran % (Auto) 0.500, Neut % (Auto) 88.4 H, Lymph %   
(Auto) 7.9 L, Mono % (Auto) 3.1, Eos % (Auto) 0.0, Baso % (Auto) 0.1, Absolute   
Neuts (auto) 11.9 H, Absolute Lymphs (auto) 1.06, Nucleated RBC % 0, Sodium 145,  
Potassium 3.5, Chloride 107, Carbon Dioxide 37.0 H, Anion Gap 1 L, BUN 21 H,   
Creatinine 0.74, Estim Creat Clear Calc 96.76, Est GFR (MDRD) Af Amer 133, Est   
GFR (MDRD) Non-Af 110, BUN/Creatinine Ratio 28.5 H, Glucose 138 H, Calcium 8.9,   
Phosphorus 1.9 L, Magnesium 2.0, Total Bilirubin 0.40, AST 6 L, ALT 11 L,   
Alkaline Phosphatase 64, Total Protein 6.5, Albumin 2.6 L, Globulin 3.9,   
Albumin/Globulin Ratio 0.7 L, TSH 0.10 L    
03/16/24 09:50: Vancomycin Trough 20.7 H    
03/16/24 14:30: Random Vancomycin 18.0 H    
    
    
Micro:     
                                  Microbiology    
    
03/15/24 02:18   Mucosa - Nose   SARS-CoV-2, Influenza & RSV (PCR) - Final    
                            RSV    
03/15/24 02:18   Mucosa - Throat   Streptococcus pyogenes (PCR) - Final    
    
    
    
Physical Exam    
Const    
alert, oriented x3 and no apparent distress    
General Appearance: cooperative, well kempt and well developed    
Orientation / Consciousness: awake, oriented to person, oriented to place and or  
iented to time    
HEENT    
normocephalic, head/scalp atraumatic and moist oral mucous membranes    
Eyes    
PERRL, EOMs intact bilaterally and conjunctivae normal    
Neck    
supple, no JVD, thyroid normal and no carotid bruits    
General: trachea midline    
Resp    
normal respiratory effort, no retractions, no use of accessory muscles and clear  
to auscultation bilaterally    
Auscultation: Negative for rales, rhonchi or wheezes    
Cardio    
regular rate, regular rhythm, S1 normal heart sound, S2 normal heart sound, no   
murmurs, no rub and no gallops    
GI    
normal to inspection, nondistended, normoactive bowel sounds, soft to palpation,  
non-tender and non-distended    
Extremity    
normal to inspection and no clubbing, cyanosis or edema    
Skin    
no rashes or lesions noted    
General Skin Exam: no breakdown    
Neuro    
oriented x3, CN's II-XII intact bilaterally, moves all extremities, no focal   
motor deficits and no sensory deficits noted    
Sensorium / Orientation: awake and alert    
Speech: speech normal    
Psych    
affect normal    
    
Assessment & Plan    
Assessment/Plan    
(1) Pharyngeal edema:     
PLAN:     
Plan    
1.  Acute on chronic combined respiratory failure secondary to RSV infection   
with an overlay of COPD-patient will continue on corticosteroids,   
bronchodilators, and empiric antibiotics, oxygen will be weaned if possible    
    
#2 pharyngeal edema-again continue present treatment, ENT is not in favor of   
performing a laryngoscopy on the patient due to his DNR Comfort Care arrest no   
intubation status    
    
#3 respiratory syncytial virus infection-supportive care will continue    
    
#4 paroxysmal atrial fibrillation-patient is on Eliquis    
    
#5 chronic obstructive pulmonary disease-continue present medications, complic  
ates care, medical course, recovery, and prognosis    
    
Total clinical time spent by myself addressing the patient's medical issues,   
reviewing all of his data, and collaborating with patient's care team: 50   
minutes    
    
Charges/Coding    
Visit Charges    
Inpatient E&M: 68458 Subs Hosp L3

## 2024-03-16 NOTE — RAD_ITS
REPORT-ID:[id number] 
 
INDICATION: AE COPD 
 
EXAMINATION/TECHNIQUE: 
X-RAY - XR Chest 1 View 
 
COMPARISON: March 15, 2024 
____________________________________________ 
 
FINDINGS: 
 
LINES/DEVICES: None. 
LUNGS: Interstitial prominence/atelectasis more significant at the lung 
bases. Persistent chronic right paratracheal/perihilar opacity similar to 
previous study. No pneumothorax. 
MEDIASTINUM AND CARDIOVASCULAR STRUCTURES: Cardiac silhouette not enlarged. 
Central airways and mediastinal contour are unremarkable. 
BONES AND SOFT TISSUES: Unremarkable. 
 
ORDER #: [id number] RAD/Chest 1 View (Portable)  
IMPRESSION:  
 
  
Interstitial prominence/atelectasis more significant at the lung bases.  
Persistent chronic right paratracheal/perihilar opacity similar to previous  
study.  
 
  
Electronically Signed:  
Rudy La DO  
2024/03/16 at 17:04 EDT  
Reading Location ID and State: [address] / [place]  
Tel [phone], Service support  [phone], Fax [phone]

## 2024-03-16 NOTE — PCM.PN.HOSP
Reason for Visit
Reason for Visit: 
Diagnoses

Other specified viral diseases (03/15/24)
Other diseases of pharynx (03/15/24)
Chronic obstructive pulmonary disease with (acute) exacerbation (03/15/24)
Acute respiratory failure with hypoxia (03/15/24)
Chronic respiratory failure with hypercapnia (03/15/24)
Acute and chronic respiratory failure with hypoxia (03/15/24)



Subjective
Subjective
Patient was seen and examined this morning, initially he was on BiPAP this morning but was able to be weaned to Airvo, I ordered a diet for the patient.  ENT consultation yesterday felt that the patient had pharyngeal edema, they recommended 
continuing antibiotics, steroids, and BiPAP as needed.  Etiology was not clear, it could be either viral or bacterial.

Objective Data
Objective Data
Vital Signs: 
Vital Signs

Temp Pulse Resp BP Pulse Ox O2 Del Method O2 Flow Rate
 99.6 F H  129 H  22 H  134/65 H  50   Airvo   50 
 03/16/24 14:00  03/16/24 15:00  03/16/24 15:00  03/16/24 15:00  03/16/24 14:00  03/16/24 15:00  03/16/24 15:00
FiO2
 50 
 03/16/24 15:00



Oxygen Flow Rate (L/min)       50                                               
Oxygen Delivery Method         Airvo                                            
Weight:                        107.6 kg                                         
Body Mass Index (BMI)          34.0                                             


Intake & Output: 
Intake and Output for Last 24 Hours

 03/14/24 03/15/24 03/16/24
 23:59 23:59 23:59
Intake Total  1155 / 1155 685 / 685
Output Total  725 / 725 800 / 800
Balance  430 / 430 -115 / -115


Lab / Micro Data

03/16/24 04:00          

03/16/24 04:00          

Labs: 
Laboratory Results - last 24 hr

03/15/24 03:11: Diff Path Review Reviewed
03/16/24 04:00: WBC 13.4 H, RBC 4.08 L, Hgb 11.6 L, Hct 40.0, MCV 98.0 H, MCH 28.4, MCHC 29.0 L, RDW Std Deviation 46.0 H, RDW Coeff of Regi 12.8, Plt Count 229, MPV 10.3, Immature Gran % (Auto) 0.500, Neut % (Auto) 88.4 H, Lymph % (Auto) 7.9 L, Mono 
% (Auto) 3.1, Eos % (Auto) 0.0, Baso % (Auto) 0.1, Absolute Neuts (auto) 11.9 H, Absolute Lymphs (auto) 1.06, Nucleated RBC % 0, Sodium 145, Potassium 3.5, Chloride 107, Carbon Dioxide 37.0 H, Anion Gap 1 L, BUN 21 H, Creatinine 0.74, Estim Creat 
Clear Calc 96.76, Est GFR (MDRD) Af Amer 133, Est GFR (MDRD) Non-Af 110, BUN/Creatinine Ratio 28.5 H, Glucose 138 H, Calcium 8.9, Phosphorus 1.9 L, Magnesium 2.0, Total Bilirubin 0.40, AST 6 L, ALT 11 L, Alkaline Phosphatase 64, Total Protein 6.5, 
Albumin 2.6 L, Globulin 3.9, Albumin/Globulin Ratio 0.7 L, TSH 0.10 L
03/16/24 09:50: Vancomycin Trough 20.7 H
03/16/24 14:30: Random Vancomycin 18.0 H


Micro: 
Microbiology

03/15/24 02:18   Mucosa - Nose   SARS-CoV-2, Influenza & RSV (PCR) - Final
                            RSV
03/15/24 02:18   Mucosa - Throat   Streptococcus pyogenes (PCR) - Final



Physical Exam
Const
alert, oriented x3 and no apparent distress
General Appearance: cooperative, well kempt and well developed
Orientation / Consciousness: awake, oriented to person, oriented to place and oriented to time
HEENT
normocephalic, head/scalp atraumatic and moist oral mucous membranes
Eyes
PERRL, EOMs intact bilaterally and conjunctivae normal
Neck
supple, no JVD, thyroid normal and no carotid bruits
General: trachea midline
Resp
normal respiratory effort, no retractions, no use of accessory muscles and clear to auscultation bilaterally
Auscultation: Negative for rales, rhonchi or wheezes
Cardio
regular rate, regular rhythm, S1 normal heart sound, S2 normal heart sound, no murmurs, no rub and no gallops
GI
normal to inspection, nondistended, normoactive bowel sounds, soft to palpation, non-tender and non-distended
Extremity
normal to inspection and no clubbing, cyanosis or edema
Skin
no rashes or lesions noted
General Skin Exam: no breakdown
Neuro
oriented x3, CN's II-XII intact bilaterally, moves all extremities, no focal motor deficits and no sensory deficits noted
Sensorium / Orientation: awake and alert
Speech: speech normal
Psych
affect normal

Assessment & Plan
Assessment/Plan
(1) Pharyngeal edema: 
PLAN: 
Plan
1.  Acute on chronic combined respiratory failure secondary to RSV infection with an overlay of COPD-patient will continue on corticosteroids, bronchodilators, and empiric antibiotics, oxygen will be weaned if possible

#2 pharyngeal edema-again continue present treatment, ENT is not in favor of performing a laryngoscopy on the patient due to his DNR Comfort Care arrest no intubation status

#3 respiratory syncytial virus infection-supportive care will continue

#4 paroxysmal atrial fibrillation-patient is on Eliquis

#5 chronic obstructive pulmonary disease-continue present medications, complicates care, medical course, recovery, and prognosis

Total clinical time spent by myself addressing the patient's medical issues, reviewing all of his data, and collaborating with patient's care team: 50 minutes

Charges/Coding
Visit Charges
Inpatient E&M: 84367 Subs Hosp L3

## 2024-03-16 NOTE — PN.CC_ITS
Objective Data    
Objective Data    
Vital Signs:     
Vital Signs Last response    
    
    
    
Temperature  36.7 C   03/16/24 04:00    
     
Temperature Source  Temporal   03/16/24 04:00    
     
Pulse Rate  89   03/16/24 07:16    
     
Pulse Strength  Weak (1+)   03/15/24 19:59    
     
Respiratory Rate  16   03/16/24 07:16    
     
Respiratory Effort  Short of Breath  03/16/24 02:17    
     
Respiratory Depth  Shallow   03/16/24 02:17    
     
Respiratory Pattern  Tachypnea   03/16/24 05:53    
     
Blood Pressure  148/82 H  03/16/24 07:00    
     
Blood Pressure Mean  104   03/16/24 07:00    
     
Blood Pressure Source  Monitor   03/16/24 07:00    
     
Blood Pressure Position  Semi-Fowlers   03/16/24 07:00    
     
Blood Pressure Location  Left Arm   03/16/24 07:00    
     
Pulse Ox  91   03/16/24 07:16    
     
Oxygen Delivery Method  Bi-pap   03/16/24 07:00    
     
Oxygen Flow Rate (L/min)  5   03/15/24 08:00    
     
Fraction of Inspired Oxygen (FIO2)  40   03/16/24 07:16    
    
    
    
I&O:     
I&O Last 24 Hours    
    
    
    
 03/15/24 03/15/24 03/16/24    
    
 11:59 23:59 11:59    
     
Intake Total 160 / 1155 995 / 1155 325 / 325    
     
Output Total 250 / 725 475 / 725 150 / 150    
     
Balance -90 / 430 520 / 430 175 / 175    
    
    
I&O: Total Stay

## 2024-03-16 NOTE — PCM.PN.TICU
Objective Data
Objective Data
Vital Signs: 
Vital Signs Last response

Temperature  36.7 C   03/16/24 04:00
Temperature Source  Temporal   03/16/24 04:00
Pulse Rate  89   03/16/24 07:16
Pulse Strength  Weak (1+)   03/15/24 19:59
Respiratory Rate  16   03/16/24 07:16
Respiratory Effort  Short of Breath  03/16/24 02:17
Respiratory Depth  Shallow   03/16/24 02:17
Respiratory Pattern  Tachypnea   03/16/24 05:53
Blood Pressure  148/82 H  03/16/24 07:00
Blood Pressure Mean  104   03/16/24 07:00
Blood Pressure Source  Monitor   03/16/24 07:00
Blood Pressure Position  Semi-Fowlers   03/16/24 07:00
Blood Pressure Location  Left Arm   03/16/24 07:00
Pulse Ox  91   03/16/24 07:16
Oxygen Delivery Method  Bi-pap   03/16/24 07:00
Oxygen Flow Rate (L/min)  5   03/15/24 08:00
Fraction of Inspired Oxygen (FIO2)  40   03/16/24 07:16


I&O: 
I&O Last 24 Hours

 03/15/24 03/15/24 03/16/24
 11:59 23:59 11:59
Intake Total 160 / 1155 995 / 1155 325 / 325
Output Total 250 / 725 475 / 725 150 / 150
Balance -90 / 430 520 / 430 175 / 175

I&O: Total Stay

 03/15/24 02:05
 thru
 03/16/24 05:27
Intake Total 1480
Output Total 875
Balance 605


Current Meds Ordered / Administered: 
Current meds ordered / Administered

Generic Name Dose Route Start Last Admin
  Trade Name Freq  PRN Reason Stop Dose Admin
Acetaminophen  650 mg  03/15/24 07:51 
  Acetaminophen 325 Mg Tablet  PO  
  Q6H PRN PRN  
  Pain 1-10 Or Fever >100.7  
Acetazolamide  250 mg  03/15/24 10:00  03/15/24 18:44
  Acetazolamide 250 Mg Tablet  PO   Not Given
  BIDCM SAMY  
Albuterol Sulfate  2.5 mg  03/15/24 07:51 
  Albuterol 2.5 Mg/3 Ml Vial.Neb.  INHALATION  
  Q4H PRN PRN  
  SOB/WHEEZING  
Albuterol/Ipratropium  3 ml  03/15/24 08:15  03/16/24 07:15
  Ipratropium/Albuterol Sulfate 3 Ml Ampul.Neb  INHALATION   3 ml
  Q6HWA.RT SAMY   Administration
Ascorbic Acid  1,000 mg  03/15/24 10:00  03/15/24 18:44
  Ascorbic Acid 500 Mg Tablet  PO   Not Given
  BIDCM SAMY  
Atorvastatin Calcium  10 mg  03/15/24 22:00  03/15/24 21:05
  Atorvastatin Calcium 10 Mg Tablet  PO   Not Given
  QHS SAMY  
Ferrous Sulfate  325 mg  03/15/24 10:00  03/15/24 18:44
  Ferrous Sulfate 325 Mg Tablet  PO   Not Given
  BIDCM SAMY  
Guaifenesin  1,200 mg  03/15/24 10:00  03/15/24 21:06
  Guaifenesin 1,200 Mg Tablet  PO   Not Given
  BID SAMY  
Pantoprazole Sodium 40 mg/  110 mls @ 330 mls/hr  03/15/24 10:00  03/15/24 10:08
  Sodium Chloride  IV   Infused
  Q24 SAMY   Infusion
Piperacillin Sod/Tazobactam  50 mls @ 12.5 mls/hr  03/15/24 12:00  03/16/24 05:21
  Sod 3.375 gm/ Sodium Chloride  IV   12.5 mls/hr
  Q8 SAMY   Administration
Vancomycin IV-PHARMACY TO DOSE  500 mls @ 250 mls/hr  03/15/24 07:51 
  1 each/ Sodium Chloride  IV  
  PRN PRN  
  Rx to Dose  
  Protocol  
Sodium Chloride  500 mls @ 0 mls/hr  03/15/24 09:30  03/15/24 20:27
  IV   15 mls/hr
  .Q0M SAMY   Infusion
  KVO  
Vancomycin HCl 1,250 mg/  275 mls @ 167 mls/hr  03/15/24 18:00  03/16/24 03:16
  Sodium Chloride  IV   Infused
  Q8H SAMY   Infusion
Lactobacillus Acidophilus  2 tablet  03/15/24 10:00  03/15/24 21:05
  Lactobacillus Acidophilus  PO   Not Given
  BID SAMY  
Melatonin  3 mg  03/15/24 07:51 
  Melatonin 3 Mg Tablet  PO  
  QHS PRN PRN  
  Insomnia  
Methylprednisolone  40 mg  03/15/24 14:00  03/16/24 05:21
  Methylprednisolone 40 Mg/Ml Vial  IV   40 mg
  Q8 SAMY   Administration
Metoprolol Tartrate  50 mg  03/15/24 10:00  03/15/24 21:07
  Metoprolol Tartrate 50 Mg Tablet  PO   Not Given
  BID [facility]  
  Protocol  
Ondansetron HCl  4 mg  03/15/24 07:51 
  Ondansetron 4 Mg/2 Ml Vial  IV  
  Q8H PRN PRN  
  NAUSEA/VOMITING  
Pramipexole Dihydrochloride  0.25 mg  03/15/24 22:00  03/15/24 21:06
  Pramipexole Di-Hcl 0.25 Mg Tablet  PO   Not Given
  QHS [facility]  
Psyllium Hydrophilic Mucilloid  1 packet  03/15/24 10:00  03/15/24 09:49
  Psyllium 1 Packet  PO   Not Given
  DAILY [facility]  
Senna/Docusate Sodium  2 tablet  03/15/24 07:51 
  Senna/Docusate Sodium 1 Tablet  PO  
  BID PRN  
  Constipation  
Sertraline HCl  150 mg  03/15/24 22:00  03/15/24 21:06
  Sertraline 100 Mg Tablet  PO   Not Given
  QHS [facility]  
Sodium Chloride  10 - 40 ml  03/15/24 08:46  03/16/24 05:21
  0.9% Saline Lock 10 Ml Syringe  IV   10 ml
  UD PRN   Administration
  SALINE FLUSH  
Tamsulosin HCl  0.4 mg  03/15/24 22:00  03/15/24 21:05
  Tamsulosin Hcl 0.4 Mg Capsule  PO   Not Given
  QHS [facility]  
Throat Lozenges  1 lozenge  03/15/24 07:51 
  Benzocaine/Menthol 1 Lozenge  MUCOUS MEM  
  Q2H PRN PRN  
  SORE THROAT  
Vancomycin Protocol  1 lab  03/16/24 07:30 
  Vancomycin Trough/Random Due    03/16/24 11:30 
  DAILY [facility]  
Zinc Sulfate  50 mg  03/15/24 10:00  03/15/24 09:50
  Zinc Sulfate 50 Mg Zinc (220 Mg) Oral Capsule  PO   Not Given
  DAILY [facility]  



Lab / Micro Data

03/16/24 04:00          

03/16/24 04:00          

Labs: 
Laboratory Results - last 24 hr

03/15/24 03:11: Diff Path Review Reviewed
03/15/24 09:45: Lactic Acid 0.6
03/16/24 04:00: WBC 13.4 H, RBC 4.08 L, Hgb 11.6 L, Hct 40.0, MCV 98.0 H, MCH 28.4, MCHC 29.0 L, RDW Std Deviation 46.0 H, RDW Coeff of Regi 12.8, Plt Count 229, MPV 10.3, Immature Gran % (Auto) 0.500, Neut % (Auto) 88.4 H, Lymph % (Auto) 7.9 L, Mono 
% (Auto) 3.1, Eos % (Auto) 0.0, Baso % (Auto) 0.1, Absolute Neuts (auto) 11.9 H, Absolute Lymphs (auto) 1.06, Nucleated RBC % 0, Sodium 145, Potassium 3.5, Chloride 107, Carbon Dioxide 37.0 H, Anion Gap 1 L, BUN 21 H, Creatinine 0.74, Estim Creat 
Clear Calc 96.76, Est GFR (MDRD) Af Amer 133, Est GFR (MDRD) Non-Af 110, BUN/Creatinine Ratio 28.5 H, Glucose 138 H, Calcium 8.9, Phosphorus 1.9 L, Magnesium 2.0, Total Bilirubin 0.40, AST 6 L, ALT 11 L, Alkaline Phosphatase 64, Total Protein 6.5, 
Albumin 2.6 L, Globulin 3.9, Albumin/Globulin Ratio 0.7 L, TSH 0.10 L



Assessment and Plan
.
Assessment and plan: 
Pt remains minimally responsive on NIV otherwise no acute events. Good volumes on NIV AVAPS FiO2 40%

PE:
General: morbidly obese acute on chronically ill appearing male; + NIV
HEENT: anicteric Sclera; nl nose; supple neck, no masses
Cardiovascular: S1/S2; No rubs, gallops; no displaced PMI; trace LE edema
Respiratory: diminished BL; no crackles, wheezes, or rhonchi
Abdominal: Non-tender; Non distended; hypoBS x 4; No Hepatosplenomegaly
Extremities: Warm, well perfused; No clubbing, cyanosis; capillary refill < 2 sec
Neurological: minimally responsive
Psych: unable to assess

A/P
#Acute hypoxic on chronic hypercapnic respiratory failure
#Acute on chronic respiratory failure with hypoxia 
#Respiratory syncytial virus (RSV) infection 
#Pharyngeal edema 
#HFpEF
#PAF on Eliquis
#Hypertension/hyperlipidemia/history of SBO/RLS/BPH/GERD/depression/advanced age & NH resident

-Cont NIV; wean FiO2 to goal sats ~90-92%; DNI
-Continue empiric antibiotics, bronchodilators and steroids
-SP ENT eval with recommendations noted
-PRN diuresis to keep euvolemic to net neg
-Cont goals of care discussions with family; per ICU RN the patient's son had mentioned possible hospice which seems appropriate given condition

CCT: 50 min
The entirety of this encounter was treated via telemedicine

## 2024-03-17 VITALS
SYSTOLIC BLOOD PRESSURE: 153 MMHG | OXYGEN SATURATION: 93 % | DIASTOLIC BLOOD PRESSURE: 74 MMHG | HEART RATE: 79 BPM | RESPIRATION RATE: 24 BRPM

## 2024-03-17 VITALS
HEART RATE: 62 BPM | SYSTOLIC BLOOD PRESSURE: 127 MMHG | RESPIRATION RATE: 17 BRPM | OXYGEN SATURATION: 94 % | DIASTOLIC BLOOD PRESSURE: 58 MMHG

## 2024-03-17 VITALS
TEMPERATURE: 97.88 F | RESPIRATION RATE: 15 BRPM | SYSTOLIC BLOOD PRESSURE: 148 MMHG | DIASTOLIC BLOOD PRESSURE: 76 MMHG | HEART RATE: 94 BPM | OXYGEN SATURATION: 94 %

## 2024-03-17 VITALS — OXYGEN SATURATION: 94 %

## 2024-03-17 VITALS
SYSTOLIC BLOOD PRESSURE: 144 MMHG | DIASTOLIC BLOOD PRESSURE: 74 MMHG | HEART RATE: 80 BPM | TEMPERATURE: 97.52 F | OXYGEN SATURATION: 91 % | RESPIRATION RATE: 20 BRPM

## 2024-03-17 VITALS
OXYGEN SATURATION: 93 % | SYSTOLIC BLOOD PRESSURE: 129 MMHG | RESPIRATION RATE: 22 BRPM | DIASTOLIC BLOOD PRESSURE: 64 MMHG | HEART RATE: 77 BPM

## 2024-03-17 VITALS
DIASTOLIC BLOOD PRESSURE: 67 MMHG | RESPIRATION RATE: 25 BRPM | HEART RATE: 73 BPM | OXYGEN SATURATION: 92 % | SYSTOLIC BLOOD PRESSURE: 129 MMHG

## 2024-03-17 VITALS
RESPIRATION RATE: 28 BRPM | HEART RATE: 79 BPM | OXYGEN SATURATION: 93 % | DIASTOLIC BLOOD PRESSURE: 63 MMHG | SYSTOLIC BLOOD PRESSURE: 128 MMHG

## 2024-03-17 VITALS
HEART RATE: 78 BPM | SYSTOLIC BLOOD PRESSURE: 133 MMHG | OXYGEN SATURATION: 92 % | RESPIRATION RATE: 28 BRPM | DIASTOLIC BLOOD PRESSURE: 62 MMHG

## 2024-03-17 VITALS
DIASTOLIC BLOOD PRESSURE: 73 MMHG | SYSTOLIC BLOOD PRESSURE: 138 MMHG | OXYGEN SATURATION: 93 % | HEART RATE: 81 BPM | RESPIRATION RATE: 20 BRPM

## 2024-03-17 VITALS
OXYGEN SATURATION: 94 % | RESPIRATION RATE: 25 BRPM | HEART RATE: 71 BPM | TEMPERATURE: 97.7 F | DIASTOLIC BLOOD PRESSURE: 83 MMHG | SYSTOLIC BLOOD PRESSURE: 136 MMHG

## 2024-03-17 VITALS
DIASTOLIC BLOOD PRESSURE: 70 MMHG | OXYGEN SATURATION: 92 % | SYSTOLIC BLOOD PRESSURE: 141 MMHG | RESPIRATION RATE: 24 BRPM | HEART RATE: 80 BPM

## 2024-03-17 VITALS — RESPIRATION RATE: 24 BRPM | OXYGEN SATURATION: 92 % | HEART RATE: 83 BPM

## 2024-03-17 VITALS
SYSTOLIC BLOOD PRESSURE: 138 MMHG | HEART RATE: 70 BPM | OXYGEN SATURATION: 95 % | RESPIRATION RATE: 23 BRPM | DIASTOLIC BLOOD PRESSURE: 69 MMHG

## 2024-03-17 VITALS
OXYGEN SATURATION: 94 % | TEMPERATURE: 97.88 F | RESPIRATION RATE: 23 BRPM | DIASTOLIC BLOOD PRESSURE: 83 MMHG | HEART RATE: 67 BPM | SYSTOLIC BLOOD PRESSURE: 157 MMHG

## 2024-03-17 VITALS — HEART RATE: 93 BPM | DIASTOLIC BLOOD PRESSURE: 74 MMHG | SYSTOLIC BLOOD PRESSURE: 153 MMHG

## 2024-03-17 VITALS
RESPIRATION RATE: 23 BRPM | SYSTOLIC BLOOD PRESSURE: 143 MMHG | HEART RATE: 83 BPM | OXYGEN SATURATION: 91 % | DIASTOLIC BLOOD PRESSURE: 80 MMHG

## 2024-03-17 VITALS
HEART RATE: 81 BPM | SYSTOLIC BLOOD PRESSURE: 144 MMHG | DIASTOLIC BLOOD PRESSURE: 68 MMHG | RESPIRATION RATE: 24 BRPM | OXYGEN SATURATION: 93 %

## 2024-03-17 VITALS
SYSTOLIC BLOOD PRESSURE: 146 MMHG | OXYGEN SATURATION: 94 % | DIASTOLIC BLOOD PRESSURE: 69 MMHG | HEART RATE: 77 BPM | TEMPERATURE: 96.98 F | RESPIRATION RATE: 31 BRPM

## 2024-03-17 VITALS
HEART RATE: 78 BPM | DIASTOLIC BLOOD PRESSURE: 55 MMHG | SYSTOLIC BLOOD PRESSURE: 131 MMHG | RESPIRATION RATE: 28 BRPM | OXYGEN SATURATION: 94 %

## 2024-03-17 VITALS — RESPIRATION RATE: 16 BRPM | HEART RATE: 74 BPM | SYSTOLIC BLOOD PRESSURE: 131 MMHG | DIASTOLIC BLOOD PRESSURE: 60 MMHG

## 2024-03-17 VITALS — OXYGEN SATURATION: 96 % | HEART RATE: 72 BPM | RESPIRATION RATE: 14 BRPM

## 2024-03-17 VITALS
DIASTOLIC BLOOD PRESSURE: 88 MMHG | HEART RATE: 94 BPM | RESPIRATION RATE: 22 BRPM | OXYGEN SATURATION: 93 % | SYSTOLIC BLOOD PRESSURE: 147 MMHG

## 2024-03-17 VITALS
SYSTOLIC BLOOD PRESSURE: 134 MMHG | RESPIRATION RATE: 24 BRPM | DIASTOLIC BLOOD PRESSURE: 75 MMHG | HEART RATE: 69 BPM | OXYGEN SATURATION: 96 %

## 2024-03-17 VITALS — HEART RATE: 79 BPM | RESPIRATION RATE: 26 BRPM | OXYGEN SATURATION: 93 %

## 2024-03-17 VITALS — OXYGEN SATURATION: 94 % | HEART RATE: 70 BPM | RESPIRATION RATE: 14 BRPM

## 2024-03-17 VITALS — DIASTOLIC BLOOD PRESSURE: 63 MMHG | HEART RATE: 78 BPM | SYSTOLIC BLOOD PRESSURE: 128 MMHG

## 2024-03-17 VITALS
OXYGEN SATURATION: 94 % | HEART RATE: 75 BPM | RESPIRATION RATE: 24 BRPM | SYSTOLIC BLOOD PRESSURE: 129 MMHG | DIASTOLIC BLOOD PRESSURE: 63 MMHG

## 2024-03-17 VITALS
OXYGEN SATURATION: 95 % | HEART RATE: 70 BPM | SYSTOLIC BLOOD PRESSURE: 133 MMHG | RESPIRATION RATE: 31 BRPM | DIASTOLIC BLOOD PRESSURE: 70 MMHG

## 2024-03-17 VITALS
OXYGEN SATURATION: 93 % | TEMPERATURE: 97.16 F | SYSTOLIC BLOOD PRESSURE: 126 MMHG | RESPIRATION RATE: 18 BRPM | DIASTOLIC BLOOD PRESSURE: 57 MMHG | HEART RATE: 74 BPM

## 2024-03-17 VITALS — OXYGEN SATURATION: 91 % | RESPIRATION RATE: 26 BRPM | HEART RATE: 84 BPM

## 2024-03-17 VITALS — RESPIRATION RATE: 14 BRPM | OXYGEN SATURATION: 93 % | HEART RATE: 59 BPM

## 2024-03-17 LAB
ANION GAP: 2 (ref 5–15)
BUN SERPL-MCNC: 30 MG/DL (ref 7–18)
BUN/CREAT RATIO: 35.5 RATIO (ref 10–20)
CALCIUM SERPL-MCNC: 9.2 MG/DL (ref 8.5–10.1)
CARBON DIOXIDE: 38 MMOL/L (ref 21–32)
CHLORIDE: 107 MMOL/L (ref 98–107)
DEPRECATED RDW RBC: 47.8 FL (ref 35.1–43.9)
ERYTHROCYTE [DISTWIDTH] IN BLOOD: 13 % (ref 11.6–14.6)
EST GLOM FILT RATE - AFR AMER: 113 ML/MIN (ref 60–?)
ESTIMATED CREATININE CLEARANCE: 91.47 ML/MIN
GLUCOSE: 140 MG/DL (ref 74–106)
HCT VFR BLD AUTO: 42.1 % (ref 40–54)
HGB BLD-MCNC: 12.1 G/DL (ref 13–16.5)
IMMATURE GRANULOCYTES COUNT: 0.07 X10^3/UL (ref 0–0)
MCV RBC: 100 FL (ref 80–94)
MEAN CORP HGB CONC: 28.7 G/DL (ref 32–36)
MEAN PLATELET VOL.: 10.4 FL (ref 6.2–12)
NRBC FLAGGED BY ANALYZER: 0 % (ref 0–5)
PLATELET # BLD: 248 K/MM3 (ref 150–450)
POTASSIUM: 3.9 MMOL/L (ref 3.5–5.1)
RBC # BLD AUTO: 4.21 M/MM3 (ref 4.6–6.2)
VANCOMYCIN TROUGH SERPL-MCNC: 21 UG/ML (ref 5–15)
WBC # BLD AUTO: 15.7 K/MM3 (ref 4.4–11)

## 2024-03-17 RX ADMIN — PANTOPRAZOLE SODIUM 330 MG: 40 INJECTION, POWDER, FOR SOLUTION INTRAVENOUS at 09:55

## 2024-03-17 RX ADMIN — VANCOMYCIN HYDROCHLORIDE 200 MG: 1 INJECTION, SOLUTION INTRAVENOUS at 20:55

## 2024-03-17 RX ADMIN — SODIUM CHLORIDE, PRESERVATIVE FREE 0 ML: 5 INJECTION INTRAVENOUS at 20:56

## 2024-03-17 RX ADMIN — SODIUM CHLORIDE, PRESERVATIVE FREE 0 ML: 5 INJECTION INTRAVENOUS at 04:03

## 2024-03-17 RX ADMIN — PIPERACILLIN SODIUM AND TAZOBACTAM SODIUM 12.5 GM: 3; .375 INJECTION, POWDER, LYOPHILIZED, FOR SOLUTION INTRAVENOUS at 14:10

## 2024-03-17 RX ADMIN — SODIUM CHLORIDE, PRESERVATIVE FREE 0 ML: 5 INJECTION INTRAVENOUS at 06:11

## 2024-03-17 RX ADMIN — APIXABAN 5 MG: 5 TABLET, FILM COATED ORAL at 09:23

## 2024-03-17 RX ADMIN — Medication 1 PACKET: at 09:21

## 2024-03-17 RX ADMIN — PIPERACILLIN SODIUM AND TAZOBACTAM SODIUM 12.5 GM: 3; .375 INJECTION, POWDER, LYOPHILIZED, FOR SOLUTION INTRAVENOUS at 06:12

## 2024-03-17 RX ADMIN — APIXABAN 5 MG: 5 TABLET, FILM COATED ORAL at 20:56

## 2024-03-17 RX ADMIN — VANCOMYCIN HYDROCHLORIDE 200 MG: 1 INJECTION, SOLUTION INTRAVENOUS at 04:02

## 2024-03-17 RX ADMIN — Medication 50 MG: at 09:22

## 2024-03-17 RX ADMIN — PIPERACILLIN SODIUM AND TAZOBACTAM SODIUM 12.5 GM: 3; .375 INJECTION, POWDER, LYOPHILIZED, FOR SOLUTION INTRAVENOUS at 22:16

## 2024-03-17 RX ADMIN — ACETYLCYSTEINE 800 MG: 200 SOLUTION ORAL; RESPIRATORY (INHALATION) at 19:00

## 2024-03-17 RX ADMIN — VANCOMYCIN HYDROCHLORIDE 200 MG: 1 INJECTION, SOLUTION INTRAVENOUS at 12:44

## 2024-03-17 NOTE — PCM.PN.HOSP
Reason for Visit
Reason for Visit: 
Diagnoses

Other specified viral diseases (03/15/24)
Other diseases of pharynx (03/15/24)
Chronic obstructive pulmonary disease with (acute) exacerbation (03/15/24)
Acute respiratory failure with hypoxia (03/15/24)
Chronic respiratory failure with hypercapnia (03/15/24)
Acute and chronic respiratory failure with hypoxia (03/15/24)



Subjective
Subjective
Patient was seen and examined today, he still remains on Airvo, he does not appear to be in any respiratory distress at rest.

Objective Data
Objective Data
Vital Signs: 
Vital Signs

Temp Pulse Resp BP Pulse Ox O2 Del Method O2 Flow Rate
 97.2 F L  73   25 H  129/67 H  92   Airvo   50 
 03/17/24 12:00  03/17/24 16:00  03/17/24 16:00  03/17/24 16:00  03/17/24 16:00  03/17/24 16:00  03/17/24 13:00
FiO2
 40 
 03/17/24 13:14



Oxygen Flow Rate (L/min)       50                                               
Oxygen Delivery Method         Airvo                                            
Weight:                        106.6 kg                                         
Body Mass Index (BMI)          33.6                                             


Intake & Output: 
Intake and Output for Last 24 Hours

 03/15/24 03/16/24 03/17/24
 23:59 23:59 23:59
Intake Total 1155 / 1155 1505 / 1505 1510 / 1510
Output Total 725 / 725 3545 / 3600 730 / 730
Balance 430 / 430 -2040 / -2095 780 / 780


Lab / Micro Data

03/17/24 04:10          

03/17/24 04:10          

Labs: 
Laboratory Results - last 24 hr

03/17/24 04:10: WBC 15.7 H, RBC 4.21 L, Hgb 12.1 L, Hct 42.1, .0 H, MCH 28.7, MCHC 28.7 L, RDW Std Deviation 47.8 H, RDW Coeff of Regi 13.0, Plt Count 248, MPV 10.4, Immature Gran % (Auto) 0.400, Neut % (Auto) 87.4 H, Lymph % (Auto) 8.1 L, 
Mono % (Auto) 4.0, Eos % (Auto) 0.0, Baso % (Auto) 0.1, Absolute Neuts (auto) 13.7 H, Absolute Lymphs (auto) 1.27, Nucleated RBC % 0, Sodium 147 H, Potassium 3.9, Chloride 107, Carbon Dioxide 38.0 H, Anion Gap 2 L, BUN 30 H, Creatinine 0.84, Estim 
Creat Clear Calc 91.47, Est GFR (MDRD) Af Amer 113, Est GFR (MDRD) Non-Af 94, BUN/Creatinine Ratio 35.5 H, Glucose 140 H, Calcium 9.2


Micro: 
Microbiology

03/15/24 02:18   Mucosa - Nose   SARS-CoV-2, Influenza & RSV (PCR) - Final
                            RSV
03/15/24 02:18   Mucosa - Throat   Streptococcus pyogenes (PCR) - Final



Physical Exam
Narrative
alert, oriented x3 and no apparent distress
General Appearance: cooperative, well kempt and well developed
Orientation / Consciousness: awake, oriented to person, oriented to place 
HEENT
normocephalic, head/scalp atraumatic and moist oral mucous membranes
Eyes
PERRL, EOMs intact bilaterally and conjunctivae normal
Neck
supple, no JVD, thyroid normal and no carotid bruits
General: trachea midline
Resp
normal respiratory effort, no retractions, no use of accessory muscles, there is scattered expiratory rhonchi bilaterally
Cardio
regular rate, regular rhythm, S1 normal heart sound, S2 normal heart sound, no murmurs, no rub and no gallops
GI
normal to inspection, nondistended, normoactive bowel sounds, soft to palpation, non-tender and non-distended
Extremity
normal to inspection and no clubbing, cyanosis or edema
Skin
no rashes or lesions noted
General Skin Exam: no breakdown
Neuro
 CN's II-XII intact bilaterally, moves all extremities, no focal motor deficits and no sensory deficits noted
Sensorium / Orientation: awake and alert
Speech: speech normal
Psych
affect normal


Assessment & Plan
Assessment/Plan
(1) Respiratory syncytial virus (RSV) infection: 
(2) Pharyngeal edema: 
PLAN: 
Plan
1.  Acute on chronic combined respiratory failure secondary to RSV infection with an overlay of COPD-patient will continue on corticosteroids, bronchodilators, and empiric antibiotics, oxygen will be weaned if possible, patient currently is on 
Airvo, sputum culture was not obtained so I will order sputum culture.

#2 pharyngeal edema-again continue present treatment, ENT is not in favor of performing a laryngoscopy on the patient due to his DNR Comfort Care arrest no intubation status

#3 respiratory syncytial virus infection-supportive care will continue

#4 paroxysmal atrial fibrillation-patient is on Eliquis

#5 chronic obstructive pulmonary disease-continue present medications, complicates care, medical course, recovery, and prognosis

Total clinical time spent by myself addressing the patient's medical issues, reviewing all of his data, and collaborating with patient's care team: 35 minutes

Charges/Coding
Visit Charges
Inpatient E&M: 79003 Subs Hosp L2

## 2024-03-17 NOTE — PN.CC_ITS
Objective Data    
Objective Data    
Vital Signs:     
Vital Signs Last response    
    
    
    
Temperature  36.1 C L  03/17/24 04:00    
     
Temperature Source  Temporal   03/17/24 04:00    
     
Pulse Rate  83   03/17/24 07:00    
     
Pulse Strength  Normal (2+)   03/16/24 21:29    
     
Respiratory Rate  23 H  03/17/24 07:00    
     
Respiratory Effort  Normal, Non-Labored  03/17/24 04:00    
     
Respiratory Depth  Normal   03/17/24 04:00    
     
Respiratory Pattern  Tachypnea   03/17/24 05:35    
     
Blood Pressure  143/80 H  03/17/24 07:00    
     
Blood Pressure Mean  101   03/17/24 07:00    
     
Blood Pressure Source  Monitor   03/17/24 07:00    
     
Blood Pressure Position  Semi-Fowlers   03/17/24 07:00    
     
Blood Pressure Location  Left Arm   03/17/24 07:00    
     
Pulse Ox  91   03/17/24 07:00    
     
Oxygen Delivery Method  Airvo   03/17/24 07:00    
     
Oxygen Flow Rate (L/min)  50   03/17/24 07:00    
     
Fraction of Inspired Oxygen (FIO2)  40   03/17/24 07:00    
    
    
    
I&O:     
I&O Last 24 Hours    
    
    
    
 03/16/24 03/16/24 03/17/24    
    
 11:59 23:59 11:59    
     
Intake Total 485 / 1505 1020 / 1505 250 / 250    
     
Output Total 350 / 3600 3195 / 3600 280 / 280    
     
Balance 135 / -2095 -2175 / -2095 -30 / -30    
    
    
I&O: Total Stay    
    
    
    
                                 03/15/24 02:05    
    
                                      thru    
    
                                 03/17/24 06:27    
     
Intake Total 2910    
     
Output Total 4550    
     
Balance -1640    
    
    
    
Current Meds Ordered / Administered:     
Current meds ordered / Administered    
    
    
    
Generic Name Dose Route Start Last Admin    
    
  Trade Name Freq  PRN Reason Stop Dose Admin    
     
Acetaminophen  650 mg  03/15/24 07:51  03/16/24 14:24    
    
  Acetaminophen 325 Mg Tablet  PO   650 mg    
    
  Q6H PRN PRN   Administration    
    
  Pain 1-10 Or Fever >100.7      
     
Acetazolamide  250 mg  03/15/24 10:00  03/16/24 18:14    
    
  Acetazolamide 250 Mg Tablet  PO   250 mg    
    
  BIDCM SAMY   Administration    
     
Albuterol Sulfate  2.5 mg  03/15/24 07:51  03/16/24 11:23    
    
  Albuterol 2.5 Mg/3 Ml Vial.Neb.  INHALATION   2.5 mg    
    
  Q4H PRN PRN   Administration    
    
  SOB/WHEEZING      
     
Albuterol/Ipratropium  3 ml  03/15/24 08:15  03/17/24 06:48    
    
  Ipratropium/Albuterol Sulfate 3 Ml Ampul.Neb  INHALATION   3 ml    
    
  Q6HWA.RT SAMY   Administration    
     
Apixaban  5 mg  03/16/24 22:00  03/16/24 20:09    
    
  Apixaban 5 Mg Tablet  PO   5 mg    
    
  BID SAMY   Administration    
     
Ascorbic Acid  1,000 mg  03/15/24 10:00  03/16/24 18:15    
    
  Ascorbic Acid 500 Mg Tablet  PO   1,000 mg    
    
  BIDCM SAMY   Administration    
     
Atorvastatin Calcium  10 mg  03/15/24 22:00  03/16/24 20:08    
    
  Atorvastatin Calcium 10 Mg Tablet  PO   10 mg    
    
  QHS SAMY   Administration    
     
Ferrous Sulfate  325 mg  03/15/24 10:00  03/16/24 18:15    
    
  Ferrous Sulfate 325 Mg Tablet  PO   325 mg    
    
  BIDCM SAMY   Administration    
     
Guaifenesin  1,200 mg  03/15/24 10:00  03/16/24 20:08    
    
  Guaifenesin 1,200 Mg Tablet  PO   1,200 mg    
    
  BID SAMY   Administration    
     
Pantoprazole Sodium 40 mg/  110 mls @ 330 mls/hr  03/15/24 10:00  03/16/24 10:30    
    
  Sodium Chloride  IV   Infused    
    
  Q24 SAMY   Infusion    
     
Piperacillin Sod/Tazobactam  50 mls @ 12.5 mls/hr  03/15/24 12:00  03/17/24   
06:12    
    
  Sod 3.375 gm/ Sodium Chloride  IV   12.5 mls/hr    
    
  Q8 SAMY   Administration    
     
Vancomycin IV-PHARMACY TO DOSE  500 mls @ 250 mls/hr  03/15/24 07:51     
    
  1 each/ Sodium Chloride  IV      
    
  PRN PRN      
    
  Rx to Dose      
    
  Protocol      
     
Sodium Chloride  500 mls @ 0 mls/hr  03/15/24 09:30  03/16/24 21:48    
    
  IV   Infused    
    
  .Q0M SAMY   Infusion    
    
  KVO      
     
Vancomycin HCl  1,000 mg in 200 mls @ 200 mls/hr  03/17/24 04:30  03/17/24 06:27    
    
  Vancomycin  IV   Infused    
    
  Q8H SAMY   Infusion    
     
Lactobacillus Acidophilus  2 tablet  03/15/24 10:00  03/16/24 20:08    
    
  Lactobacillus Acidophilus  PO   2 tablet    
    
  BID SAMY   Administration    
     
Melatonin  3 mg  03/15/24 07:51  03/16/24 20:07    
    
  Melatonin 3 Mg Tablet  PO   3 mg    
    
  QHS PRN PRN   Administration    
    
  Insomnia      
     
Methylprednisolone  40 mg  03/15/24 14:00  03/17/24 06:12    
    
  Methylprednisolone 40 Mg/Ml Vial  IV   40 mg    
    
  Q8 SAMY   Administration    
     
Metoprolol Tartrate  50 mg  03/15/24 10:00  03/16/24 20:08    
    
  Metoprolol Tartrate 50 Mg Tablet  PO   50 mg    
    
  BID SAMY   Administration    
    
  Protocol      
     
Ondansetron HCl  4 mg  03/15/24 07:51     
    
  Ondansetron 4 Mg/2 Ml Vial  IV      
    
  Q8H PRN PRN      
    
  NAUSEA/VOMITING      
     
Pramipexole Dihydrochloride  0.25 mg  03/15/24 22:00  03/16/24 20:08    
    
  Pramipexole Di-Hcl 0.25 Mg Tablet  PO   0.25 mg    
    
  QHS [facility]   Administration    
     
Psyllium Hydrophilic Mucilloid  1 packet  03/15/24 10:00  03/16/24 09:51    
    
  Psyllium 1 Packet  PO   Not Given    
    
  DAILY [facility]      
     
Senna/Docusate Sodium  2 tablet  03/15/24 07:51     
    
  Senna/Docusate Sodium 1 Tablet  PO      
    
  BID PRN      
    
  Constipation      
     
Sertraline HCl  150 mg  03/15/24 22:00  03/16/24 20:07    
    
  Sertraline 100 Mg Tablet  PO   150 mg    
    
  QHS [facility]   Administration    
     
Sodium Chloride  10 - 40 ml  03/15/24 08:46  03/17/24 06:11    
    
  0.9% Saline Lock 10 Ml Syringe  IV   10 ml    
    
  UD PRN   Administration    
    
  SALINE FLUSH      
     
Tamsulosin HCl  0.4 mg  03/15/24 22:00  03/16/24 20:08    
    
  Tamsulosin Hcl 0.4 Mg Capsule  PO   0.4 mg    
    
  QHS [facility]   Administration    
     
Throat Lozenges  1 lozenge  03/15/24 07:51     
    
  Benzocaine/Menthol 1 Lozenge  MUCOUS MEM      
    
  Q2H PRN PRN      
    
  SORE THROAT      
     
Vancomycin Protocol  1 lab  03/17/24 18:00     
    
  Vancomycin Trough/Random Due  MC  03/17/24 22:00     
    
  DAILY [facility]      
     
Zinc Sulfate  50 mg  03/15/24 10:00  03/16/24 10:40    
    
  Zinc Sulfate 50 Mg Zinc (220 Mg) Oral Capsule  PO   Not Given    
    
  DAILY [facility]      
    
    
    
    
Lab / Micro Data    
    
                                                        03/17/24 04:10              
    
                                                        03/17/24 04:10              
    
Labs:     
                         Laboratory Results - last 24 hr    
    
03/16/24 09:50: Vancomycin Trough 20.7 H    
03/16/24 14:30: Random Vancomycin 18.0 H    
03/17/24 04:10: WBC 15.7 H, RBC 4.21 L, Hgb 12.1 L, Hct 42.1, .0 H, MCH   
28.7, MCHC 28.7 L, RDW Std Deviation 47.8 H, RDW Coeff of Regi 13.0, Plt Count 24  
8, MPV 10.4, Immature Gran % (Auto) 0.400, Neut % (Auto) 87.4 H, Lymph % (Auto)   
8.1 L, Mono % (Auto) 4.0, Eos % (Auto) 0.0, Baso % (Auto) 0.1, Absolute Neuts   
(auto) 13.7 H, Absolute Lymphs (auto) 1.27, Nucleated RBC % 0, Sodium 147 H,   
Potassium 3.9, Chloride 107, Carbon Dioxide 38.0 H, Anion Gap 2 L, BUN 30 H,   
Creatinine 0.84, Estim Creat Clear Calc 91.47, Est GFR (MDRD) Af Amer 113, Est   
GFR (MDRD) Non-Af 94, BUN/Creatinine Ratio 35.5 H, Glucose 140 H, Calcium 9.2    
    
    
Imaging    
                              Radiology Impression    
    
Chest X-Ray  03/16/24 05:55    
IMPRESSION:    
     
Interstitial prominence/atelectasis more significant at the lung bases.    
Persistent chronic right paratracheal/perihilar opacity similar to previous    
study.    
     
Electronically Signed:    
Rudy La DO    
2024/03/16 at 17:04 EDT    
Reading Location ID and State: [facility] / [place]    
Tel [phone], Service support  [phone], Fax [phone]    
     
    
    
    
    
Assessment and Plan    
.    
Assessment and plan:     
    
Critical Care Time:    
The entirety of this encounter was done via Telemedicine

## 2024-03-17 NOTE — PN.HOSP_ITS
Reason for Visit    
Reason for Visit:     
Diagnoses    
    
Other specified viral diseases (03/15/24)    
Other diseases of pharynx (03/15/24)    
Chronic obstructive pulmonary disease with (acute) exacerbation (03/15/24)    
Acute respiratory failure with hypoxia (03/15/24)    
Chronic respiratory failure with hypercapnia (03/15/24)    
Acute and chronic respiratory failure with hypoxia (03/15/24)    
    
    
    
Subjective    
Subjective    
Patient was seen and examined today, he still remains on Airvo, he does not   
appear to be in any respiratory distress at rest.    
    
Objective Data    
Objective Data    
Vital Signs:     
Vital Signs    
    
    
    
Temp Pulse Resp BP Pulse Ox O2 Del Method O2 Flow Rate    
     
 97.2 F L  73   25 H  129/67 H  92   Airvo   50     
     
 03/17/24 12:00  03/17/24 16:00  03/17/24 16:00  03/17/24 16:00  03/17/24 16:00   
03/17/24 16:00  03/17/24 13:00    
    
    
    
FiO2    
     
 40     
     
 03/17/24 13:14    
    
    
    
    
Oxygen Flow Rate (L/min)         50                                               
       
Oxygen Delivery Method           Airvo                                            
       
Weight:                          106.6 kg                                         
       
Body Mass Index (BMI)            33.6                                             
       
    
    
Intake & Output:     
                       Intake and Output for Last 24 Hours    
    
    
    
 03/15/24 03/16/24 03/17/24    
    
 23:59 23:59 23:59    
     
Intake Total 1155 / 1155 1505 / 1505 1510 / 1510    
     
Output Total 725 / 725 3545 / 3600 730 / 730    
     
Balance 430 / 430 -2040 / -2095 780 / 780    
    
    
    
Lab / Micro Data    
    
                                                        03/17/24 04:10              
    
                                                        03/17/24 04:10              
    
Labs:     
                         Laboratory Results - last 24 hr    
    
03/17/24 04:10: WBC 15.7 H, RBC 4.21 L, Hgb 12.1 L, Hct 42.1, .0 H, MCH   
28.7, MCHC 28.7 L, RDW Std Deviation 47.8 H, RDW Coeff of Regi 13.0, Plt Count   
248, MPV 10.4, Immature Gran % (Auto) 0.400, Neut % (Auto) 87.4 H, Lymph %   
(Auto) 8.1 L, Mono % (Auto) 4.0, Eos % (Auto) 0.0, Baso % (Auto) 0.1, Absolute   
Neuts (auto) 13.7 H, Absolute Lymphs (auto) 1.27, Nucleated RBC % 0, Sodium 147   
H, Potassium 3.9, Chloride 107, Carbon Dioxide 38.0 H, Anion Gap 2 L, BUN 30 H,   
Creatinine 0.84, Estim Creat Clear Calc 91.47, Est GFR (MDRD) Af Amer 113, Est   
GFR (MDRD) Non-Af 94, BUN/Creatinine Ratio 35.5 H, Glucose 140 H, Calcium 9.2    
    
    
Micro:     
                                  Microbiology    
    
03/15/24 02:18   Mucosa - Nose   SARS-CoV-2, Influenza & RSV (PCR) - Final    
                            RSV    
03/15/24 02:18   Mucosa - Throat   Streptococcus pyogenes (PCR) - Final    
    
    
    
Physical Exam    
Narrative    
alert, oriented x3 and no apparent distress    
General Appearance: cooperative, well kempt and well developed    
Orientation / Consciousness: awake, oriented to person, oriented to place     
HEENT    
normocephalic, head/scalp atraumatic and moist oral mucous membranes    
Eyes    
PERRL, EOMs intact bilaterally and conjunctivae normal    
Neck    
supple, no JVD, thyroid normal and no carotid bruits    
General: trachea midline    
Resp    
normal respiratory effort, no retractions, no use of accessory muscles, there is  
scattered expiratory rhonchi bilaterally    
Cardio    
regular rate, regular rhythm, S1 normal heart sound, S2 normal heart sound, no   
murmurs, no rub and no gallops    
GI    
normal to inspection, nondistended, normoactive bowel sounds, soft to palpation,  
non-tender and non-distended    
Extremity    
normal to inspection and no clubbing, cyanosis or edema    
Skin    
no rashes or lesions noted    
General Skin Exam: no breakdown    
Neuro    
 CN's II-XII intact bilaterally, moves all extremities, no focal motor deficits   
and no sensory deficits noted    
Sensorium / Orientation: awake and alert    
Speech: speech normal    
Psych    
affect normal    
    
    
Assessment & Plan    
Assessment/Plan    
(1) Respiratory syncytial virus (RSV) infection:     
(2) Pharyngeal edema:     
PLAN:     
Plan    
1.  Acute on chronic combined respiratory failure secondary to RSV infection   
with an overlay of COPD-patient will continue on corticosteroids,   
bronchodilators, and empiric antibiotics, oxygen will be weaned if possible,   
patient currently is on Airvo, sputum culture was not obtained so I will order   
sputum culture.    
    
#2 pharyngeal edema-again continue present treatment, ENT is not in favor of   
performing a laryngoscopy on the patient due to his DNR Comfort Care arrest no   
intubation status    
    
#3 respiratory syncytial virus infection-supportive care will continue    
    
#4 paroxysmal atrial fibrillation-patient is on Eliquis    
    
#5 chronic obstructive pulmonary disease-continue present medications,   
complicates care, medical course, recovery, and prognosis    
    
Total clinical time spent by myself addressing the patient's medical issues,   
reviewing all of his data, and collaborating with patient's care team: 35 minut  
es    
    
Charges/Coding    
Visit Charges    
Inpatient E&M: 78418 Subs Hosp L2

## 2024-03-17 NOTE — PCM.RX.CS
Consult
Antibiotic Management
Pharmacy has been consulted to manage selected antibiotic: Vancomycin
Type of Intervention
Type of Consult: Follow-up
Labs
Labs: 


Sodium  147 mmol/L (136-145)  H  24  04:10    
Potassium  3.9 mmol/L (3.5-5.1)   24  04:10    
Chloride  107 mmol/L ()   24  04:10    
Carbon Dioxide  38.0 mmol/L (21.0-32.0)  H  24  04:10    
Anion Gap  2  (5-15)  L  24  04:10    
BUN  30 mg/dL (7-18)  H  24  04:10    
Creatinine  0.84 mg/dL (0.70-1.30)   24  04:10    
Est GFR (MDRD) Af Amer  113 mL/min (>60)   24  04:10    
Est GFR (MDRD) Non-Af  94 mL/min (>60)   24  04:10    
BUN/Creatinine Ratio  35.5 RATIO (10-20)  H  24  04:10    
Glucose  140 mg/dL ()  H  24  04:10    
Vancomycin Trough  21.0 ug/mL (5.0-15.0)  H  24  19:51    
Random Vancomycin  18.0 ug/mL (0.0-15.0)  H  24  14:30    


Microbiology
Microbiology: 
Microbiology

03/15/24 02:18   Mucosa - Nose   SARS-CoV-2, Influenza & RSV (PCR) - Final
                            RSV
03/15/24 02:18   Mucosa - Throat   Streptococcus pyogenes (PCR) - Final


Dosing Weight
Weight used for dosin.6 kg
Estimated Creatinine Clearance
Estimated Creatinine Clearance: 91
Goal Trough
Goal Trough: 15-20 mcg/mL
Pharmacy Plan for Drug Dosing
Pharmacy Plan for Drug Dosing: 
Vancomycin trough level of 21.0 was above the target range of 15-20. This was drawn just 7 hours post-dose, so aminoglycoside dosing calculator was used. It estimated that continuing at 1000mg q8h should give an estimated trough of 19.7. Will 
continue at that dose and re-draw a trough in two days.
Pharmacy Service will continue to monitor and adjust dosing as required. 


Follow-Up Labs
Follow-Up Labs: Trough: Vancomycin
Date/Time Labs Ordered
Labs to be done on [date and time ordered]: 24 @

## 2024-03-17 NOTE — PCM.PN.TICU
Objective Data
Objective Data
Vital Signs: 
Vital Signs Last response

Temperature  36.1 C L  03/17/24 04:00
Temperature Source  Temporal   03/17/24 04:00
Pulse Rate  83   03/17/24 07:00
Pulse Strength  Normal (2+)   03/16/24 21:29
Respiratory Rate  23 H  03/17/24 07:00
Respiratory Effort  Normal, Non-Labored  03/17/24 04:00
Respiratory Depth  Normal   03/17/24 04:00
Respiratory Pattern  Tachypnea   03/17/24 05:35
Blood Pressure  143/80 H  03/17/24 07:00
Blood Pressure Mean  101   03/17/24 07:00
Blood Pressure Source  Monitor   03/17/24 07:00
Blood Pressure Position  Semi-Fowlers   03/17/24 07:00
Blood Pressure Location  Left Arm   03/17/24 07:00
Pulse Ox  91   03/17/24 07:00
Oxygen Delivery Method  Airvo   03/17/24 07:00
Oxygen Flow Rate (L/min)  50   03/17/24 07:00
Fraction of Inspired Oxygen (FIO2)  40   03/17/24 07:00


I&O: 
I&O Last 24 Hours

 03/16/24 03/16/24 03/17/24
 11:59 23:59 11:59
Intake Total 485 / 1505 1020 / 1505 250 / 250
Output Total 350 / 3600 3195 / 3600 280 / 280
Balance 135 / -2095 -2175 / -2095 -30 / -30

I&O: Total Stay

 03/15/24 02:05
 thru
 03/17/24 06:27
Intake Total 2910
Output Total 4550
Balance -1640


Current Meds Ordered / Administered: 
Current meds ordered / Administered

Generic Name Dose Route Start Last Admin
  Trade Name Freq  PRN Reason Stop Dose Admin
Acetaminophen  650 mg  03/15/24 07:51  03/16/24 14:24
  Acetaminophen 325 Mg Tablet  PO   650 mg
  Q6H PRN PRN   Administration
  Pain 1-10 Or Fever >100.7  
Acetazolamide  250 mg  03/15/24 10:00  03/16/24 18:14
  Acetazolamide 250 Mg Tablet  PO   250 mg
  BIDCM SAMY   Administration
Albuterol Sulfate  2.5 mg  03/15/24 07:51  03/16/24 11:23
  Albuterol 2.5 Mg/3 Ml Vial.Neb.  INHALATION   2.5 mg
  Q4H PRN PRN   Administration
  SOB/WHEEZING  
Albuterol/Ipratropium  3 ml  03/15/24 08:15  03/17/24 06:48
  Ipratropium/Albuterol Sulfate 3 Ml Ampul.Neb  INHALATION   3 ml
  Q6HWA.RT SAMY   Administration
Apixaban  5 mg  03/16/24 22:00  03/16/24 20:09
  Apixaban 5 Mg Tablet  PO   5 mg
  BID SAMY   Administration
Ascorbic Acid  1,000 mg  03/15/24 10:00  03/16/24 18:15
  Ascorbic Acid 500 Mg Tablet  PO   1,000 mg
  BIDCM SAMY   Administration
Atorvastatin Calcium  10 mg  03/15/24 22:00  03/16/24 20:08
  Atorvastatin Calcium 10 Mg Tablet  PO   10 mg
  [facility] SAMY   Administration
Ferrous Sulfate  325 mg  03/15/24 10:00  03/16/24 18:15
  Ferrous Sulfate 325 Mg Tablet  PO   325 mg
  BIDCM SAMY   Administration
Guaifenesin  1,200 mg  03/15/24 10:00  03/16/24 20:08
  Guaifenesin 1,200 Mg Tablet  PO   1,200 mg
  BID SAMY   Administration
Pantoprazole Sodium 40 mg/  110 mls @ 330 mls/hr  03/15/24 10:00  03/16/24 10:30
  Sodium Chloride  IV   Infused
  Q24 SAMY   Infusion
Piperacillin Sod/Tazobactam  50 mls @ 12.5 mls/hr  03/15/24 12:00  03/17/24 06:12
  Sod 3.375 gm/ Sodium Chloride  IV   12.5 mls/hr
  Q8 SAMY   Administration
Vancomycin IV-PHARMACY TO DOSE  500 mls @ 250 mls/hr  03/15/24 07:51 
  1 each/ Sodium Chloride  IV  
  PRN PRN  
  Rx to Dose  
  Protocol  
Sodium Chloride  500 mls @ 0 mls/hr  03/15/24 09:30  03/16/24 21:48
  IV   Infused
  .Q0M SAMY   Infusion
  KVO  
Vancomycin HCl  1,000 mg in 200 mls @ 200 mls/hr  03/17/24 04:30  03/17/24 06:27
  Vancomycin  IV   Infused
  Q8H SAMY   Infusion
Lactobacillus Acidophilus  2 tablet  03/15/24 10:00  03/16/24 20:08
  Lactobacillus Acidophilus  PO   2 tablet
  BID SAMY   Administration
Melatonin  3 mg  03/15/24 07:51  03/16/24 20:07
  Melatonin 3 Mg Tablet  PO   3 mg
  [facility] PRN PRN   Administration
  Insomnia  
Methylprednisolone  40 mg  03/15/24 14:00  03/17/24 06:12
  Methylprednisolone 40 Mg/Ml Vial  IV   40 mg
  Q8 SAMY   Administration
Metoprolol Tartrate  50 mg  03/15/24 10:00  03/16/24 20:08
  Metoprolol Tartrate 50 Mg Tablet  PO   50 mg
  BID SAMY   Administration
  Protocol  
Ondansetron HCl  4 mg  03/15/24 07:51 
  Ondansetron 4 Mg/2 Ml Vial  IV  
  Q8H PRN PRN  
  NAUSEA/VOMITING  
Pramipexole Dihydrochloride  0.25 mg  03/15/24 22:00  03/16/24 20:08
  Pramipexole Di-Hcl 0.25 Mg Tablet  PO   0.25 mg
  [facility]   Administration
Psyllium Hydrophilic Mucilloid  1 packet  03/15/24 10:00  03/16/24 09:51
  Psyllium 1 Packet  PO   Not Given
  [facility]  
Senna/Docusate Sodium  2 tablet  03/15/24 07:51 
  Senna/Docusate Sodium 1 Tablet  PO  
  BID PRN  
  Constipation  
Sertraline HCl  150 mg  03/15/24 22:00  03/16/24 20:07
  Sertraline 100 Mg Tablet  PO   150 mg
  [facility]   Administration
Sodium Chloride  10 - 40 ml  03/15/24 08:46  03/17/24 06:11
  0.9% Saline Lock 10 Ml Syringe  IV   10 ml
  UD PRN   Administration
  SALINE FLUSH  
Tamsulosin HCl  0.4 mg  03/15/24 22:00  03/16/24 20:08
  Tamsulosin Hcl 0.4 Mg Capsule  PO   0.4 mg
  [facility]   Administration
Throat Lozenges  1 lozenge  03/15/24 07:51 
  Benzocaine/Menthol 1 Lozenge  MUCOUS MEM  
  Q2H PRN PRN  
  SORE THROAT  
Vancomycin Protocol  1 lab  03/17/24 18:00 
  Vancomycin Trough/Random Due  [facility]  03/17/24 22:00 
  [facility]  
Zinc Sulfate  50 mg  03/15/24 10:00  03/16/24 10:40
  Zinc Sulfate 50 Mg Zinc (220 Mg) Oral Capsule  PO   Not Given
  [facility]  



Lab / Micro Data

03/17/24 04:10          

03/17/24 04:10          

Labs: 
Laboratory Results - last 24 hr

03/16/24 09:50: Vancomycin Trough 20.7 H
03/16/24 14:30: Random Vancomycin 18.0 H
03/17/24 04:10: WBC 15.7 H, RBC 4.21 L, Hgb 12.1 L, Hct 42.1, .0 H, MCH 28.7, MCHC 28.7 L, RDW Std Deviation 47.8 H, RDW Coeff of Regi 13.0, Plt Count 248, MPV 10.4, Immature Gran % (Auto) 0.400, Neut % (Auto) 87.4 H, Lymph % (Auto) 8.1 L, 
Mono % (Auto) 4.0, Eos % (Auto) 0.0, Baso % (Auto) 0.1, Absolute Neuts (auto) 13.7 H, Absolute Lymphs (auto) 1.27, Nucleated RBC % 0, Sodium 147 H, Potassium 3.9, Chloride 107, Carbon Dioxide 38.0 H, Anion Gap 2 L, BUN 30 H, Creatinine 0.84, Estim 
Creat Clear Calc 91.47, Est GFR (MDRD) Af Amer 113, Est GFR (MDRD) Non-Af 94, BUN/Creatinine Ratio 35.5 H, Glucose 140 H, Calcium 9.2


Imaging
Radiology Impression

Chest X-Ray  03/16/24 05:55
IMPRESSION:
 
Interstitial prominence/atelectasis more significant at the lung bases.
Persistent chronic right paratracheal/perihilar opacity similar to previous
study.
 
Electronically Signed:
Rudy La DO
2024/03/16 at 17:04 EDT
Reading Location ID and State: [facility] / [place]
Tel [phone], Service support  [phone], Fax [phone]
 




Assessment and Plan
.
Assessment and plan: 

Critical Care Time:
The entirety of this encounter was done via Telemedicine

## 2024-03-18 VITALS
RESPIRATION RATE: 26 BRPM | TEMPERATURE: 97.6 F | DIASTOLIC BLOOD PRESSURE: 68 MMHG | OXYGEN SATURATION: 94 % | HEART RATE: 67 BPM | SYSTOLIC BLOOD PRESSURE: 137 MMHG

## 2024-03-18 VITALS
RESPIRATION RATE: 22 BRPM | TEMPERATURE: 97.52 F | HEART RATE: 74 BPM | OXYGEN SATURATION: 96 % | DIASTOLIC BLOOD PRESSURE: 74 MMHG | SYSTOLIC BLOOD PRESSURE: 136 MMHG

## 2024-03-18 VITALS
RESPIRATION RATE: 22 BRPM | OXYGEN SATURATION: 92 % | DIASTOLIC BLOOD PRESSURE: 59 MMHG | SYSTOLIC BLOOD PRESSURE: 109 MMHG | TEMPERATURE: 97.7 F | HEART RATE: 96 BPM

## 2024-03-18 VITALS
HEART RATE: 100 BPM | OXYGEN SATURATION: 91 % | RESPIRATION RATE: 22 BRPM | TEMPERATURE: 97.52 F | DIASTOLIC BLOOD PRESSURE: 78 MMHG | SYSTOLIC BLOOD PRESSURE: 119 MMHG

## 2024-03-18 VITALS
TEMPERATURE: 97.7 F | SYSTOLIC BLOOD PRESSURE: 130 MMHG | DIASTOLIC BLOOD PRESSURE: 57 MMHG | HEART RATE: 78 BPM | RESPIRATION RATE: 29 BRPM | OXYGEN SATURATION: 93 %

## 2024-03-18 VITALS
DIASTOLIC BLOOD PRESSURE: 75 MMHG | RESPIRATION RATE: 23 BRPM | HEART RATE: 77 BPM | TEMPERATURE: 97.88 F | SYSTOLIC BLOOD PRESSURE: 141 MMHG | OXYGEN SATURATION: 93 %

## 2024-03-18 VITALS
HEART RATE: 79 BPM | TEMPERATURE: 97.88 F | SYSTOLIC BLOOD PRESSURE: 127 MMHG | RESPIRATION RATE: 17 BRPM | OXYGEN SATURATION: 92 % | DIASTOLIC BLOOD PRESSURE: 74 MMHG

## 2024-03-18 VITALS
RESPIRATION RATE: 28 BRPM | OXYGEN SATURATION: 93 % | TEMPERATURE: 97.7 F | DIASTOLIC BLOOD PRESSURE: 57 MMHG | HEART RATE: 69 BPM | SYSTOLIC BLOOD PRESSURE: 113 MMHG

## 2024-03-18 VITALS — OXYGEN SATURATION: 93 % | HEART RATE: 75 BPM | RESPIRATION RATE: 26 BRPM

## 2024-03-18 VITALS — RESPIRATION RATE: 25 BRPM | HEART RATE: 74 BPM | OXYGEN SATURATION: 92 %

## 2024-03-18 VITALS
HEART RATE: 84 BPM | RESPIRATION RATE: 23 BRPM | TEMPERATURE: 97.4 F | SYSTOLIC BLOOD PRESSURE: 125 MMHG | DIASTOLIC BLOOD PRESSURE: 61 MMHG | OXYGEN SATURATION: 96 %

## 2024-03-18 VITALS — HEART RATE: 67 BPM | RESPIRATION RATE: 24 BRPM

## 2024-03-18 VITALS
OXYGEN SATURATION: 94 % | RESPIRATION RATE: 25 BRPM | HEART RATE: 81 BPM | DIASTOLIC BLOOD PRESSURE: 76 MMHG | TEMPERATURE: 97.16 F | SYSTOLIC BLOOD PRESSURE: 136 MMHG

## 2024-03-18 VITALS
OXYGEN SATURATION: 94 % | HEART RATE: 71 BPM | SYSTOLIC BLOOD PRESSURE: 134 MMHG | TEMPERATURE: 97.6 F | RESPIRATION RATE: 23 BRPM | DIASTOLIC BLOOD PRESSURE: 72 MMHG

## 2024-03-18 VITALS
SYSTOLIC BLOOD PRESSURE: 103 MMHG | RESPIRATION RATE: 27 BRPM | DIASTOLIC BLOOD PRESSURE: 89 MMHG | OXYGEN SATURATION: 91 % | HEART RATE: 64 BPM | TEMPERATURE: 97.34 F

## 2024-03-18 VITALS — HEART RATE: 81 BPM | RESPIRATION RATE: 25 BRPM | OXYGEN SATURATION: 91 %

## 2024-03-18 VITALS — DIASTOLIC BLOOD PRESSURE: 74 MMHG | SYSTOLIC BLOOD PRESSURE: 136 MMHG | HEART RATE: 74 BPM

## 2024-03-18 VITALS — OXYGEN SATURATION: 95 % | RESPIRATION RATE: 20 BRPM

## 2024-03-18 VITALS — OXYGEN SATURATION: 93 %

## 2024-03-18 VITALS — HEART RATE: 78 BPM

## 2024-03-18 RX ADMIN — APIXABAN 5 MG: 5 TABLET, FILM COATED ORAL at 20:12

## 2024-03-18 RX ADMIN — APIXABAN 5 MG: 5 TABLET, FILM COATED ORAL at 09:15

## 2024-03-18 RX ADMIN — ACETYLCYSTEINE 800 MG: 200 SOLUTION ORAL; RESPIRATORY (INHALATION) at 21:20

## 2024-03-18 RX ADMIN — PIPERACILLIN SODIUM AND TAZOBACTAM SODIUM 12.5 GM: 3; .375 INJECTION, POWDER, LYOPHILIZED, FOR SOLUTION INTRAVENOUS at 15:23

## 2024-03-18 RX ADMIN — SODIUM CHLORIDE, PRESERVATIVE FREE 0 ML: 5 INJECTION INTRAVENOUS at 04:41

## 2024-03-18 RX ADMIN — PIPERACILLIN SODIUM AND TAZOBACTAM SODIUM 12.5 GM: 3; .375 INJECTION, POWDER, LYOPHILIZED, FOR SOLUTION INTRAVENOUS at 20:12

## 2024-03-18 RX ADMIN — VANCOMYCIN HYDROCHLORIDE 200 MG: 1 INJECTION, SOLUTION INTRAVENOUS at 12:45

## 2024-03-18 RX ADMIN — Medication 1 PACKET: at 09:15

## 2024-03-18 RX ADMIN — PIPERACILLIN SODIUM AND TAZOBACTAM SODIUM 12.5 GM: 3; .375 INJECTION, POWDER, LYOPHILIZED, FOR SOLUTION INTRAVENOUS at 06:13

## 2024-03-18 RX ADMIN — VANCOMYCIN HYDROCHLORIDE 200 MG: 1 INJECTION, SOLUTION INTRAVENOUS at 04:41

## 2024-03-18 RX ADMIN — ACETYLCYSTEINE 800 MG: 200 SOLUTION ORAL; RESPIRATORY (INHALATION) at 07:02

## 2024-03-18 RX ADMIN — PANTOPRAZOLE SODIUM 330 MG: 40 INJECTION, POWDER, FOR SOLUTION INTRAVENOUS at 11:29

## 2024-03-18 NOTE — PN.HOSP_ITS
Reason for Visit    
Reason for Visit:     
Diagnoses    
    
Other specified viral diseases (03/15/24)    
Other diseases of pharynx (03/15/24)    
Chronic obstructive pulmonary disease with (acute) exacerbation (03/15/24)    
Acute respiratory failure with hypoxia (03/15/24)    
Chronic respiratory failure with hypercapnia (03/15/24)    
Acute and chronic respiratory failure with hypoxia (03/15/24)    
    
    
    
Subjective    
Subjective    
Patient was seen and examined today, he is alert and appropriate, he does not   
appear to be in any respiratory distress, nursing states that the patient has ha  
d episodes of what appears to be junctional rhythm at a rate of around 70 bpm,   
he is asymptomatic during these episodes, patient remains on Airvo at this time   
and is being seen by critical care.    
    
Objective Data    
Objective Data    
Vital Signs:     
Vital Signs    
    
    
    
Temp Pulse Resp BP Pulse Ox O2 Del Method O2 Flow Rate    
     
 97.4 F L  84   23 H  125/61 H  96   Airvo   50     
     
 03/18/24 17:00  03/18/24 17:00  03/18/24 17:00  03/18/24 17:00  03/18/24 17:00   
03/18/24 17:00  03/18/24 17:00    
    
    
    
FiO2    
     
 43     
     
 03/18/24 17:00    
    
    
    
    
Oxygen Flow Rate (L/min)         50                                               
       
Oxygen Delivery Method           Airvo                                            
       
Weight:                          107.3 kg                                         
       
Body Mass Index (BMI)            33.8                                             
       
    
    
Intake & Output:     
                       Intake and Output for Last 24 Hours    
    
    
    
 03/16/24 03/17/24 03/18/24    
    
 23:59 23:59 23:59    
     
Intake Total 1505 / 1505 2010 / 2010 610 / 610    
     
Output Total 3545 / 3600 1225 / 1365 1150 / 1150    
     
Balance -2040 / -2095 785 / 645 -540 / -540    
    
    
    
Lab / Micro Data    
    
                                                        03/17/24 04:10              
    
                                                        03/17/24 04:10              
    
Labs:     
                         Laboratory Results - last 24 hr    
    
03/17/24 19:51: Vancomycin Trough 21.0 H    
    
    
Micro:     
                                  Microbiology    
    
03/18/24 11:25   Nasal Secretion   MRSA (PCR) - Final    
03/15/24 05:24   Blood Culture (Wb) - Right Forearm   Blood Culture -   
Preliminary    
                            No growth in 48 hours.    
03/15/24 05:49   Blood Culture (Wb) - Left Forearm   Blood Culture - Preliminary    
                            No growth in 48 hours.    
03/15/24 02:18   Mucosa - Nose   SARS-CoV-2, Influenza & RSV (PCR) - Final    
                            RSV    
03/15/24 02:18   Mucosa - Throat   Streptococcus pyogenes (PCR) - Final    
    
    
    
Physical Exam    
Narrative    
alert, oriented x3 and no apparent distress    
General Appearance: cooperative, well kempt and well developed    
Orientation / Consciousness: awake, oriented to person, oriented to place     
HEENT    
normocephalic, head/scalp atraumatic and moist oral mucous membranes    
Eyes    
PERRL, EOMs intact bilaterally and conjunctivae normal    
Neck    
supple, no JVD, thyroid normal and no carotid bruits    
General: trachea midline    
Resp    
normal respiratory effort, no retractions, no use of accessory muscles, there is  
scattered expiratory rhonchi bilaterally    
Cardio    
regular rate, regular rhythm, S1 normal heart sound, S2 normal heart sound, no   
murmurs, no rub and no gallops    
GI    
normal to inspection, nondistended, normoactive bowel sounds, soft to palpation,  
non-tender and non-distended    
Extremity    
normal to inspection and no clubbing, cyanosis or edema    
Skin    
no rashes or lesions noted    
General Skin Exam: no breakdown    
Neuro    
 CN's II-XII intact bilaterally, moves all extremities, no focal motor deficits   
and no sensory deficits noted    
Sensorium / Orientation: awake and alert    
Speech: speech normal    
Psych    
affect normal    
    
    
Assessment & Plan    
Assessment/Plan    
(1) Respiratory syncytial virus (RSV) infection:     
(2) Pharyngeal edema:     
PLAN:     
Plan    
1.  Acute on chronic combined respiratory failure secondary to RSV infection   
with an overlay of COPD-patient will continue on corticosteroids,   
bronchodilators, and empiric antibiotics, oxygen will be weaned if possible,   
patient currently is on Airvo, sputum culture is pending    
    
#2 pharyngeal edema-again continue present treatment, ENT is not in favor of   
performing a laryngoscopy on the patient due to his DNR Comfort Care arrest no   
intubation status    
    
#3 respiratory syncytial virus infection-supportive care will continue    
    
#4 paroxysmal atrial fibrillation-patient is on Eliquis    
    
#5 chronic obstructive pulmonary disease-continue present medications,   
complicates care, medical course, recovery, and prognosis    
    
#6 junctional rhythm-paroxysmal-patient will be observed, he is on no rate   
limiting agents at this time.    
    
Total clinical time spent by myself addressing the patient's medical issues,   
reviewing all of his data, and collaborating with patient's care team: 35   
minutes    
    
Charges/Coding    
Visit Charges    
Inpatient E&M: 65058 Subs Hosp L2

## 2024-03-18 NOTE — PN.CC_ITS
Assessment & Plan    
Assessment/Plan    
(1) Respiratory syncytial virus (RSV) infection:     
(2) Acute on chronic respiratory failure with hypoxia:     
PLAN:     
Plan    
    
RECOMMENDATIONS:     
1.  Continue antibiotics.    
2.  Continue scheduled bronchodilators and steroids.    
3.  Wean FiO2 to maintain saturations at or above 90%.    
4.  Recommend PAP therapy with naps and nightly.    
5.  Encourage incentive spirometer use and mobilize patient as tolerated.    
    
IMPRESSIONS:    
1.  Acute on chronic combined respiratory failure    
Appears to be secondary to COPD exacerbation due to RSV infection.  In addition,  
the patient had pharyngeal edema noted on CT imaging at presentation, for which   
ENT evaluated the patient.  They recommended continuing antibiotics and   
steroids.  The patient appears stable from a respiratory perspective.  Recommend  
continuing to wean FiO2 as tolerated to maintain saturations at or above 90%.    
Continue antibiotics along with scheduled bronchodilators and steroids.  The   
patient will need to be scheduled with his primary pulmonologist, Dr. Jin,   
upon discharge from the hospital for follow-up.    
    
2.  Self-reported history of obstructive sleep apnea    
Although the patient has a known history of obstructive sleep apnea, his   
outpatient compliance with nocturnal PAP therapy is questionable.  For now, it   
is reasonable to continue him on PAP therapy with naps and nightly while   
admitted to the hospital.    
    
3.  Obesity/hypertension/hyperlipidemia/history of colon CA/history of   
paroxysmal atrial fibrillation    
Complicates care, management, recovery and prognosis.  Continue home medications  
as indicated.    
    
This note was generated with Dragon dictation software. It may contain incorrect  
words, spelling, and punctuation that were not noted in checking the note before  
signing.    
    
    
Subjective    
Subjective    
The patient was seen and examined at the bedside this morning.  Events from the   
last 24 hours have been reviewed.  The patient is currently afebrile,   
hemodynamically stable and maintaining appropriate oxygen saturations on heated   
high flow with an FiO2 requirement of 40%.  The patient continues to have thick,  
copious secretions.  He only utilize PAP therapy overnight for approximately 3   
hours.    
    
The patient has an apparent history of COPD of unknown severity along with   
chronic hypoxemic respiratory failure with a baseline 3 L/min requirement and   
obstructive sleep apnea, which is currently under the management of Dr. Jin   
of pulmonary medicine on an outpatient basis.  Although the patient reported   
that he tested positive for sleep apnea, he reported inconsistent use of   
nocturnal Pap therapy.      
    
    
Objective Data    
Objective Data    
The patient's most recent lab work, culture data and imaging studies have all   
been personally reviewed.  The patient was noted to be positive for RSV on March  
15.  Blood cultures have not demonstrated any growth to date.    
    
Vital Signs:     
Vital Signs    
    
    
    
Temp Pulse Resp BP Pulse Ox O2 Del Method O2 Flow Rate    
     
 97.8 F   81   25 H  127/74 H  91   Airvo   50     
     
 03/18/24 06:00  03/18/24 07:03  03/18/24 07:03  03/18/24 06:00  03/18/24 07:03   
03/18/24 06:00  03/18/24 06:00    
    
    
    
FiO2    
     
 40     
     
 03/18/24 07:03    
    
    
    
    
Oxygen Flow Rate (L/min)         50                                               
       
Oxygen Delivery Method           Airvo                                            
       
Weight:                          236 lb 8.896 oz                                  
       
Body Mass Index (BMI)            33.8                                             
       
    
    
Intake & Output:     
                       Intake and Output for Last 24 Hours    
    
    
    
 03/16/24 03/17/24 03/18/24    
    
 23:59 23:59 23:59    
     
Intake Total 1505 / 1505 2010 / 2010 250 / 250    
     
Output Total 3545 / 3600 1225 / 1365 350 / 350    
     
Balance -2040 / -2095 785 / 645 -100 / -100    
    
    
    
Lab / Micro Data    
Attestation: I reviewed the patient's lab results.    
    
                                                        03/17/24 04:10              
    
                                                        03/17/24 04:10              
    
Labs:     
                         Laboratory Results - last 24 hr    
    
03/17/24 19:51: Vancomycin Trough 21.0 H    
    
    
Micro:     
                                  Microbiology    
    
03/15/24 02:18   Mucosa - Nose   SARS-CoV-2, Influenza & RSV (PCR) - Final    
                            RSV    
03/15/24 02:18   Mucosa - Throat   Streptococcus pyogenes (PCR) - Final    
    
    
    
Physical Exam    
Const    
alert and no apparent distress    
General Appearance: cooperative    
HEENT    
normocephalic, head/scalp atraumatic and moist oral mucous membranes    
Eyes    
PERRL, EOMs intact bilaterally and conjunctivae normal    
Neck    
supple    
General: trachea midline    
Chest    
inspection of chest normal    
Resp    
normal respiratory effort    
Auscultation: diminished lung sounds; Negative for rales, rhonchi or wheezes    
Cardio    
regular rate and regular rhythm    
GI    
normal to inspection, nondistended, normoactive bowel sounds    
Extremity    
no clubbing, cyanosis or edema    
Skin    
no rashes or lesions noted    
Neuro    
CN's II-XII intact bilaterally, moves all extremities and no focal motor   
deficits    
Psych    
cooperative and affect normal    
    
Charges/Coding    
Visit Charges    
Inpatient E&M: 73347 Subs Hosp L2

## 2024-03-18 NOTE — PCM.PN.INT
Assessment & Plan
Assessment/Plan
(1) Respiratory syncytial virus (RSV) infection: 
(2) Acute on chronic respiratory failure with hypoxia: 
PLAN: 
Plan

RECOMMENDATIONS: 
1.  Continue antibiotics.
2.  Continue scheduled bronchodilators and steroids.
3.  Wean FiO2 to maintain saturations at or above 90%.
4.  Recommend PAP therapy with naps and nightly.
5.  Encourage incentive spirometer use and mobilize patient as tolerated.

IMPRESSIONS:
1.  Acute on chronic combined respiratory failure
Appears to be secondary to COPD exacerbation due to RSV infection.  In addition, the patient had pharyngeal edema noted on CT imaging at presentation, for which ENT evaluated the patient.  They recommended continuing antibiotics and steroids.  The 
patient appears stable from a respiratory perspective.  Recommend continuing to wean FiO2 as tolerated to maintain saturations at or above 90%.  Continue antibiotics along with scheduled bronchodilators and steroids.  The patient will need to be 
scheduled with his primary pulmonologist, Dr. Jin, upon discharge from the hospital for follow-up.

2.  Self-reported history of obstructive sleep apnea
Although the patient has a known history of obstructive sleep apnea, his outpatient compliance with nocturnal PAP therapy is questionable.  For now, it is reasonable to continue him on PAP therapy with naps and nightly while admitted to the hospital.

3.  Obesity/hypertension/hyperlipidemia/history of colon CA/history of paroxysmal atrial fibrillation
Complicates care, management, recovery and prognosis.  Continue home medications as indicated.

This note was generated with Dragon dictation software. It may contain incorrect words, spelling, and punctuation that were not noted in checking the note before signing.


Subjective
Subjective
The patient was seen and examined at the bedside this morning.  Events from the last 24 hours have been reviewed.  The patient is currently afebrile, hemodynamically stable and maintaining appropriate oxygen saturations on heated high flow with an 
FiO2 requirement of 40%.  The patient continues to have thick, copious secretions.  He only utilize PAP therapy overnight for approximately 3 hours.

The patient has an apparent history of COPD of unknown severity along with chronic hypoxemic respiratory failure with a baseline 3 L/min requirement and obstructive sleep apnea, which is currently under the management of Dr. Jin of pulmonary 
medicine on an outpatient basis.  Although the patient reported that he tested positive for sleep apnea, he reported inconsistent use of nocturnal Pap therapy.  


Objective Data
Objective Data
The patient's most recent lab work, culture data and imaging studies have all been personally reviewed.  The patient was noted to be positive for RSV on March 15.  Blood cultures have not demonstrated any growth to date.

Vital Signs: 
Vital Signs

Temp Pulse Resp BP Pulse Ox O2 Del Method O2 Flow Rate
 97.8 F   81   25 H  127/74 H  91   Airvo   50 
 03/18/24 06:00  03/18/24 07:03  03/18/24 07:03  03/18/24 06:00  03/18/24 07:03  03/18/24 06:00  03/18/24 06:00
FiO2
 40 
 03/18/24 07:03



Oxygen Flow Rate (L/min)       50                                               
Oxygen Delivery Method         Airvo                                            
Weight:                        236 lb 8.896 oz                                  
Body Mass Index (BMI)          33.8                                             


Intake & Output: 
Intake and Output for Last 24 Hours

 03/16/24 03/17/24 03/18/24
 23:59 23:59 23:59
Intake Total 1505 / 1505 2010 / 2010 250 / 250
Output Total 3545 / 3600 1225 / 1365 350 / 350
Balance -2040 / -2095 785 / 645 -100 / -100


Lab / Micro Data
Attestation: I reviewed the patient's lab results.

03/17/24 04:10          

03/17/24 04:10          

Labs: 
Laboratory Results - last 24 hr

03/17/24 19:51: Vancomycin Trough 21.0 H


Micro: 
Microbiology

03/15/24 02:18   Mucosa - Nose   SARS-CoV-2, Influenza & RSV (PCR) - Final
                            RSV
03/15/24 02:18   Mucosa - Throat   Streptococcus pyogenes (PCR) - Final



Physical Exam
Const
alert and no apparent distress
General Appearance: cooperative
HEENT
normocephalic, head/scalp atraumatic and moist oral mucous membranes
Eyes
PERRL, EOMs intact bilaterally and conjunctivae normal
Neck
supple
General: trachea midline
Chest
inspection of chest normal
Resp
normal respiratory effort
Auscultation: diminished lung sounds; Negative for rales, rhonchi or wheezes
Cardio
regular rate and regular rhythm
GI
normal to inspection, nondistended, normoactive bowel sounds
Extremity
no clubbing, cyanosis or edema
Skin
no rashes or lesions noted
Neuro
CN's II-XII intact bilaterally, moves all extremities and no focal motor deficits
Psych
cooperative and affect normal

Charges/Coding
Visit Charges
Inpatient E&M: 93394 Subs Hosp L2

## 2024-03-18 NOTE — PCM.PN.HOSP
Reason for Visit
Reason for Visit: 
Diagnoses

Other specified viral diseases (03/15/24)
Other diseases of pharynx (03/15/24)
Chronic obstructive pulmonary disease with (acute) exacerbation (03/15/24)
Acute respiratory failure with hypoxia (03/15/24)
Chronic respiratory failure with hypercapnia (03/15/24)
Acute and chronic respiratory failure with hypoxia (03/15/24)



Subjective
Subjective
Patient was seen and examined today, he is alert and appropriate, he does not appear to be in any respiratory distress, nursing states that the patient has had episodes of what appears to be junctional rhythm at a rate of around 70 bpm, he is 
asymptomatic during these episodes, patient remains on Airvo at this time and is being seen by critical care.

Objective Data
Objective Data
Vital Signs: 
Vital Signs

Temp Pulse Resp BP Pulse Ox O2 Del Method O2 Flow Rate
 97.4 F L  84   23 H  125/61 H  96   Airvo   50 
 03/18/24 17:00  03/18/24 17:00  03/18/24 17:00  03/18/24 17:00  03/18/24 17:00  03/18/24 17:00  03/18/24 17:00
FiO2
 43 
 03/18/24 17:00



Oxygen Flow Rate (L/min)       50                                               
Oxygen Delivery Method         Airvo                                            
Weight:                        107.3 kg                                         
Body Mass Index (BMI)          33.8                                             


Intake & Output: 
Intake and Output for Last 24 Hours

 03/16/24 03/17/24 03/18/24
 23:59 23:59 23:59
Intake Total 1505 / 1505 2010 / 2010 610 / 610
Output Total 3545 / 3600 1225 / 1365 1150 / 1150
Balance -2040 / -2095 785 / 645 -540 / -540


Lab / Micro Data

03/17/24 04:10          

03/17/24 04:10          

Labs: 
Laboratory Results - last 24 hr

03/17/24 19:51: Vancomycin Trough 21.0 H


Micro: 
Microbiology

03/18/24 11:25   Nasal Secretion   MRSA (PCR) - Final
03/15/24 05:24   Blood Culture (Wb) - Right Forearm   Blood Culture - Preliminary
                            No growth in 48 hours.
03/15/24 05:49   Blood Culture (Wb) - Left Forearm   Blood Culture - Preliminary
                            No growth in 48 hours.
03/15/24 02:18   Mucosa - Nose   SARS-CoV-2, Influenza & RSV (PCR) - Final
                            RSV
03/15/24 02:18   Mucosa - Throat   Streptococcus pyogenes (PCR) - Final



Physical Exam
Narrative
alert, oriented x3 and no apparent distress
General Appearance: cooperative, well kempt and well developed
Orientation / Consciousness: awake, oriented to person, oriented to place 
HEENT
normocephalic, head/scalp atraumatic and moist oral mucous membranes
Eyes
PERRL, EOMs intact bilaterally and conjunctivae normal
Neck
supple, no JVD, thyroid normal and no carotid bruits
General: trachea midline
Resp
normal respiratory effort, no retractions, no use of accessory muscles, there is scattered expiratory rhonchi bilaterally
Cardio
regular rate, regular rhythm, S1 normal heart sound, S2 normal heart sound, no murmurs, no rub and no gallops
GI
normal to inspection, nondistended, normoactive bowel sounds, soft to palpation, non-tender and non-distended
Extremity
normal to inspection and no clubbing, cyanosis or edema
Skin
no rashes or lesions noted
General Skin Exam: no breakdown
Neuro
 CN's II-XII intact bilaterally, moves all extremities, no focal motor deficits and no sensory deficits noted
Sensorium / Orientation: awake and alert
Speech: speech normal
Psych
affect normal


Assessment & Plan
Assessment/Plan
(1) Respiratory syncytial virus (RSV) infection: 
(2) Pharyngeal edema: 
PLAN: 
Plan
1.  Acute on chronic combined respiratory failure secondary to RSV infection with an overlay of COPD-patient will continue on corticosteroids, bronchodilators, and empiric antibiotics, oxygen will be weaned if possible, patient currently is on 
Airvo, sputum culture is pending

#2 pharyngeal edema-again continue present treatment, ENT is not in favor of performing a laryngoscopy on the patient due to his DNR Comfort Care arrest no intubation status

#3 respiratory syncytial virus infection-supportive care will continue

#4 paroxysmal atrial fibrillation-patient is on Eliquis

#5 chronic obstructive pulmonary disease-continue present medications, complicates care, medical course, recovery, and prognosis

#6 junctional rhythm-paroxysmal-patient will be observed, he is on no rate limiting agents at this time.

Total clinical time spent by myself addressing the patient's medical issues, reviewing all of his data, and collaborating with patient's care team: 35 minutes

Charges/Coding
Visit Charges
Inpatient E&M: 75860 Subs Hosp L2

## 2024-03-18 NOTE — CASEMGMT
Discharge Planning
[facility].
Angelic Stringer, Discharge Planning Asst.

## 2024-03-19 VITALS — RESPIRATION RATE: 16 BRPM | HEART RATE: 82 BPM

## 2024-03-19 VITALS
TEMPERATURE: 98.24 F | SYSTOLIC BLOOD PRESSURE: 139 MMHG | HEART RATE: 66 BPM | OXYGEN SATURATION: 95 % | DIASTOLIC BLOOD PRESSURE: 65 MMHG | RESPIRATION RATE: 18 BRPM

## 2024-03-19 VITALS
TEMPERATURE: 96.6 F | SYSTOLIC BLOOD PRESSURE: 140 MMHG | OXYGEN SATURATION: 96 % | RESPIRATION RATE: 28 BRPM | HEART RATE: 60 BPM | DIASTOLIC BLOOD PRESSURE: 63 MMHG

## 2024-03-19 VITALS — RESPIRATION RATE: 28 BRPM | OXYGEN SATURATION: 90 % | HEART RATE: 84 BPM

## 2024-03-19 VITALS
SYSTOLIC BLOOD PRESSURE: 118 MMHG | DIASTOLIC BLOOD PRESSURE: 67 MMHG | RESPIRATION RATE: 20 BRPM | TEMPERATURE: 97.8 F | HEART RATE: 73 BPM | OXYGEN SATURATION: 93 %

## 2024-03-19 VITALS
DIASTOLIC BLOOD PRESSURE: 70 MMHG | RESPIRATION RATE: 17 BRPM | HEART RATE: 83 BPM | OXYGEN SATURATION: 95 % | TEMPERATURE: 97.88 F | SYSTOLIC BLOOD PRESSURE: 150 MMHG

## 2024-03-19 VITALS — HEART RATE: 83 BPM

## 2024-03-19 VITALS — RESPIRATION RATE: 24 BRPM | HEART RATE: 71 BPM

## 2024-03-19 VITALS
HEART RATE: 84 BPM | DIASTOLIC BLOOD PRESSURE: 66 MMHG | TEMPERATURE: 96.9 F | OXYGEN SATURATION: 91 % | SYSTOLIC BLOOD PRESSURE: 145 MMHG | RESPIRATION RATE: 20 BRPM

## 2024-03-19 VITALS — OXYGEN SATURATION: 94 %

## 2024-03-19 VITALS — OXYGEN SATURATION: 97 %

## 2024-03-19 VITALS — OXYGEN SATURATION: 86 %

## 2024-03-19 VITALS — OXYGEN SATURATION: 98 %

## 2024-03-19 VITALS — OXYGEN SATURATION: 93 %

## 2024-03-19 VITALS — RESPIRATION RATE: 22 BRPM | HEART RATE: 75 BPM | OXYGEN SATURATION: 96 %

## 2024-03-19 VITALS — SYSTOLIC BLOOD PRESSURE: 145 MMHG | HEART RATE: 84 BPM | DIASTOLIC BLOOD PRESSURE: 66 MMHG

## 2024-03-19 VITALS — OXYGEN SATURATION: 96 %

## 2024-03-19 VITALS — OXYGEN SATURATION: 82 %

## 2024-03-19 RX ADMIN — PIPERACILLIN SODIUM AND TAZOBACTAM SODIUM 12.5 GM: 3; .375 INJECTION, POWDER, LYOPHILIZED, FOR SOLUTION INTRAVENOUS at 21:31

## 2024-03-19 RX ADMIN — APIXABAN 5 MG: 5 TABLET, FILM COATED ORAL at 09:18

## 2024-03-19 RX ADMIN — PIPERACILLIN SODIUM AND TAZOBACTAM SODIUM 12.5 GM: 3; .375 INJECTION, POWDER, LYOPHILIZED, FOR SOLUTION INTRAVENOUS at 05:09

## 2024-03-19 RX ADMIN — PANTOPRAZOLE SODIUM 330 MG: 40 INJECTION, POWDER, FOR SOLUTION INTRAVENOUS at 09:18

## 2024-03-19 RX ADMIN — APIXABAN 5 MG: 5 TABLET, FILM COATED ORAL at 21:31

## 2024-03-19 RX ADMIN — PIPERACILLIN SODIUM AND TAZOBACTAM SODIUM 12.5 GM: 3; .375 INJECTION, POWDER, LYOPHILIZED, FOR SOLUTION INTRAVENOUS at 15:04

## 2024-03-19 RX ADMIN — Medication 1 PACKET: at 09:18

## 2024-03-19 RX ADMIN — ACETYLCYSTEINE 800 MG: 200 SOLUTION ORAL; RESPIRATORY (INHALATION) at 07:14

## 2024-03-19 RX ADMIN — ACETYLCYSTEINE 800 MG: 200 SOLUTION ORAL; RESPIRATORY (INHALATION) at 18:40

## 2024-03-19 NOTE — PCM.PN.INT
Assessment & Plan
Assessment/Plan
(1) Respiratory syncytial virus (RSV) infection: 
(2) Acute on chronic respiratory failure with hypoxia: 
PLAN: 
Plan

RECOMMENDATIONS: 
1.  Continue antibiotics to complete 7 days of therapy.
2.  Continue scheduled bronchodilators and steroids.  Recommend prednisone taper at discharge.
3.  Transition patient to regular nasal cannula supplemental O2 and wean to maintain saturations at or above 90%.
4.  Recommend PAP therapy with naps and nightly.
5.  Encourage incentive spirometer use and mobilize patient as tolerated.
6.  Perform walking oximetry study prior to consideration for discharge home.
7.  Follow-up with primary pulmonologist, Dr. Jin, following discharge.
8.  Will sign off at this time.  Please call with any additional questions.

IMPRESSIONS:
1.  Acute on chronic combined respiratory failure
Appears to be secondary to COPD exacerbation due to RSV infection.  In addition, the patient had pharyngeal edema noted on CT imaging at presentation, for which ENT evaluated the patient.  They recommended continuing antibiotics and steroids.  The 
patient appears stable from a respiratory perspective.  Recommend continuing to wean supplemental oxygen as tolerated to maintain saturations at or above 90%.  Continue antibiotics along with scheduled bronchodilators and steroids.  The patient will 
need to be scheduled with his primary pulmonologist, Dr. Jin, upon discharge from the hospital for follow-up.

2.  Self-reported history of obstructive sleep apnea
Although the patient has a known history of obstructive sleep apnea, his outpatient compliance with nocturnal PAP therapy is questionable.  For now, it is reasonable to continue him on PAP therapy with naps and nightly while admitted to the hospital.

3.  Obesity/hypertension/hyperlipidemia/history of colon CA/history of paroxysmal atrial fibrillation
Complicates care, management, recovery and prognosis.  Continue home medications as indicated.

This note was generated with Dragon dictation software. It may contain incorrect words, spelling, and punctuation that were not noted in checking the note before signing.


Subjective
Subjective
The patient was seen and examined at the bedside this morning.  Events from the last 24 hours have been reviewed.  The patient is currently afebrile, hemodynamically stable and maintaining appropriate oxygen saturations on heated high flow with an 
FiO2 requirement of 38% and flow rate of 50 L/min.  The patient is sitting in his bedside recliner and has no specific respiratory related complaints.


Objective Data
Objective Data
The patient's most recent lab work, culture data and imaging studies have all been personally reviewed.  The patient was noted to be positive for RSV on March 15.  Blood cultures have not demonstrated any growth to date.

Vital Signs: 
Vital Signs

Temp Pulse Resp BP Pulse Ox O2 Del Method O2 Flow Rate
 98 F   83   17   150/70 H  95   Airvo   50 
 03/19/24 09:14  03/19/24 09:18  03/19/24 09:14  03/19/24 09:14  03/19/24 09:14  03/19/24 09:14  03/19/24 09:14
FiO2
 38 
 03/19/24 09:14



Oxygen Flow Rate (L/min)       50                                               
Oxygen Delivery Method         Airvo                                            
Weight:                        239 lb 6.752 oz                                  
Body Mass Index (BMI)          34.2                                             


Intake & Output: 
Intake and Output for Last 24 Hours

 03/17/24 03/18/24 03/19/24
 23:59 23:59 23:59
Intake Total 2010 / 2010 660 / 660 210 / 210
Output Total 1225 / 1365 1150 / 1150 425 / 425
Balance 785 / 645 -490 / -490 -215 / -215


Lab / Micro Data
Attestation: I reviewed the patient's lab results.

03/17/24 04:10          

03/17/24 04:10          

Labs: 
Laboratory Results - last 24 hr

03/17/24 19:51: Vancomycin Trough 21.0 H


Micro: 
Microbiology

03/18/24 11:25   Nasal Secretion   MRSA (PCR) - Final
03/15/24 05:24   Blood Culture (Wb) - Right Forearm   Blood Culture - Preliminary
                            No growth in 48 hours.
03/15/24 05:49   Blood Culture (Wb) - Left Forearm   Blood Culture - Preliminary
                            No growth in 48 hours.
03/15/24 02:18   Mucosa - Nose   SARS-CoV-2, Influenza & RSV (PCR) - Final
                            RSV
03/15/24 02:18   Mucosa - Throat   Streptococcus pyogenes (PCR) - Final



Physical Exam
Const
alert and no apparent distress
Constitutional Narrative: 
Sitting in bedside recliner.
General Appearance: cooperative
HEENT
normocephalic, head/scalp atraumatic and moist oral mucous membranes
Eyes
PERRL, EOMs intact bilaterally and conjunctivae normal
Neck
supple
General: trachea midline
Chest
inspection of chest normal
Resp
normal respiratory effort
Auscultation: diminished lung sounds; Negative for rales, rhonchi or wheezes
Cardio
regular rate and regular rhythm
GI
normal to inspection, nondistended, normoactive bowel sounds
Extremity
no clubbing, cyanosis or edema
Skin
no rashes or lesions noted
Neuro
CN's II-XII intact bilaterally, moves all extremities and no focal motor deficits
Psych
cooperative and affect normal

Charges/Coding
Visit Charges
Inpatient E&M: 53375 Subs Hosp L2 room air

## 2024-03-19 NOTE — PN.HOSP_ITS
Reason for Visit    
Reason for Visit:     
Diagnoses    
    
Other specified viral diseases (03/15/24)    
Other diseases of pharynx (03/15/24)    
Chronic obstructive pulmonary disease with (acute) exacerbation (03/15/24)    
Acute respiratory failure with hypoxia (03/15/24)    
Chronic respiratory failure with hypercapnia (03/15/24)    
Acute and chronic respiratory failure with hypoxia (03/15/24)    
    
    
    
Subjective    
Subjective    
Patient was seen and examined today, he remains on 5 L of nasal cannula oxygen.   
Patient does not appear to be short of breath at rest.    
    
Objective Data    
Objective Data    
Vital Signs:     
Vital Signs    
    
    
    
Temp Pulse Resp BP Pulse Ox O2 Del Method O2 Flow Rate    
     
 98.2 F   66   18   139/65 H  96   High Flow   5     
     
 03/19/24 14:56  03/19/24 14:56  03/19/24 14:56  03/19/24 14:56  03/19/24 16:04   
03/19/24 16:04  03/19/24 16:04    
    
    
    
FiO2    
     
 38     
     
 03/19/24 09:14    
    
    
    
    
Oxygen Flow Rate (L/min)         5                                                
       
Oxygen Delivery Method           High Flow                                        
       
Weight:                          108.6 kg                                         
       
Body Mass Index (BMI)            34.2                                             
       
    
    
Intake & Output:     
                       Intake and Output for Last 24 Hours    
    
    
    
 03/17/24 03/18/24 03/19/24    
    
 23:59 23:59 23:59    
     
Intake Total 2010 / 2010 660 / 660 210 / 210    
     
Output Total 1225 / 1365 1150 / 1150 1075 / 1075    
     
Balance 785 / 645 -490 / -490 -865 / -865    
    
    
    
Lab / Micro Data    
    
                                                        03/17/24 04:10              
    
                                                        03/17/24 04:10              
    
Micro:     
                                  Microbiology    
    
03/18/24 11:25   Nasal Secretion   MRSA (PCR) - Final    
03/15/24 05:24   Blood Culture (Wb) - Right Forearm   Blood Culture -   
Preliminary    
                            No growth in 48 hours.    
03/15/24 05:49   Blood Culture (Wb) - Left Forearm   Blood Culture - Preliminary    
                            No growth in 48 hours.    
03/15/24 02:18   Mucosa - Nose   SARS-CoV-2, Influenza & RSV (PCR) - Final    
                            RSV    
03/15/24 02:18   Mucosa - Throat   Streptococcus pyogenes (PCR) - Final    
    
    
    
Physical Exam    
Narrative    
alert, oriented x3 and no apparent distress    
General Appearance: cooperative, well kempt and well developed    
Orientation / Consciousness: awake, oriented to person, oriented to place     
HEENT    
normocephalic, head/scalp atraumatic and moist oral mucous membranes    
Eyes    
PERRL, EOMs intact bilaterally and conjunctivae normal    
Neck    
supple, no JVD, thyroid normal and no carotid bruits    
General: trachea midline    
Resp    
normal respiratory effort, no retractions, no use of accessory muscles, there is  
scattered expiratory rhonchi bilaterally    
Cardio    
regular rate, regular rhythm, S1 normal heart sound, S2 normal heart sound, no   
murmurs, no rub and no gallops    
GI    
normal to inspection, nondistended, normoactive bowel sounds, soft to palpation,  
non-tender and non-distended    
Extremity    
normal to inspection and no clubbing, cyanosis or edema    
Skin    
no rashes or lesions noted    
General Skin Exam: no breakdown    
Neuro    
 CN's II-XII intact bilaterally, moves all extremities, no focal motor deficits   
and no sensory deficits noted    
Sensorium / Orientation: awake and alert    
Speech: speech normal    
Psych    
affect normal    
    
Assessment & Plan    
Assessment/Plan    
(1) Respiratory syncytial virus (RSV) infection:     
(2) Pharyngeal edema:     
PLAN:     
Plan    
1.  Acute on chronic combined respiratory failure secondary to RSV infection   
with an overlay of COPD-patient will continue on corticosteroids,   
bronchodilators, and empiric antibiotics, oxygen will be weaned if possible,   
patient is currently on nasal cannula oxygen at this time    
    
#2 pharyngeal edema-again continue present treatment, ENT is not in favor of   
performing a laryngoscopy on the patient due to his DNR Comfort Care arrest no   
intubation status    
    
#3 respiratory syncytial virus infection-supportive care will continue    
    
#4 paroxysmal atrial fibrillation-patient is on Eliquis    
    
#5 chronic obstructive pulmonary disease-continue present medications,   
complicates care, medical course, recovery, and prognosis    
    
#6 junctional rhythm-paroxysmal-patient will be observed, he is on no rate   
limiting agents at this time.    
    
Total clinical time spent by myself addressing the patient's medical issues,   
reviewing all of his data, and collaborating with patient's care team: 35   
minutes    
    
Charges/Coding    
Visit Charges    
Inpatient E&M: 29607 Subs Hosp L2

## 2024-03-19 NOTE — PCM.PN.HOSP
Reason for Visit
Reason for Visit: 
Diagnoses

Other specified viral diseases (03/15/24)
Other diseases of pharynx (03/15/24)
Chronic obstructive pulmonary disease with (acute) exacerbation (03/15/24)
Acute respiratory failure with hypoxia (03/15/24)
Chronic respiratory failure with hypercapnia (03/15/24)
Acute and chronic respiratory failure with hypoxia (03/15/24)



Subjective
Subjective
Patient was seen and examined today, he remains on 5 L of nasal cannula oxygen.  Patient does not appear to be short of breath at rest.

Objective Data
Objective Data
Vital Signs: 
Vital Signs

Temp Pulse Resp BP Pulse Ox O2 Del Method O2 Flow Rate
 98.2 F   66   18   139/65 H  96   High Flow   5 
 03/19/24 14:56  03/19/24 14:56  03/19/24 14:56  03/19/24 14:56  03/19/24 16:04  03/19/24 16:04  03/19/24 16:04
FiO2
 38 
 03/19/24 09:14



Oxygen Flow Rate (L/min)       5                                                
Oxygen Delivery Method         High Flow                                        
Weight:                        108.6 kg                                         
Body Mass Index (BMI)          34.2                                             


Intake & Output: 
Intake and Output for Last 24 Hours

 03/17/24 03/18/24 03/19/24
 23:59 23:59 23:59
Intake Total 2010 / 2010 660 / 660 210 / 210
Output Total 1225 / 1365 1150 / 1150 1075 / 1075
Balance 785 / 645 -490 / -490 -865 / -865


Lab / Micro Data

03/17/24 04:10          

03/17/24 04:10          

Micro: 
Microbiology

03/18/24 11:25   Nasal Secretion   MRSA (PCR) - Final
03/15/24 05:24   Blood Culture (Wb) - Right Forearm   Blood Culture - Preliminary
                            No growth in 48 hours.
03/15/24 05:49   Blood Culture (Wb) - Left Forearm   Blood Culture - Preliminary
                            No growth in 48 hours.
03/15/24 02:18   Mucosa - Nose   SARS-CoV-2, Influenza & RSV (PCR) - Final
                            RSV
03/15/24 02:18   Mucosa - Throat   Streptococcus pyogenes (PCR) - Final



Physical Exam
Narrative
alert, oriented x3 and no apparent distress
General Appearance: cooperative, well kempt and well developed
Orientation / Consciousness: awake, oriented to person, oriented to place 
HEENT
normocephalic, head/scalp atraumatic and moist oral mucous membranes
Eyes
PERRL, EOMs intact bilaterally and conjunctivae normal
Neck
supple, no JVD, thyroid normal and no carotid bruits
General: trachea midline
Resp
normal respiratory effort, no retractions, no use of accessory muscles, there is scattered expiratory rhonchi bilaterally
Cardio
regular rate, regular rhythm, S1 normal heart sound, S2 normal heart sound, no murmurs, no rub and no gallops
GI
normal to inspection, nondistended, normoactive bowel sounds, soft to palpation, non-tender and non-distended
Extremity
normal to inspection and no clubbing, cyanosis or edema
Skin
no rashes or lesions noted
General Skin Exam: no breakdown
Neuro
 CN's II-XII intact bilaterally, moves all extremities, no focal motor deficits and no sensory deficits noted
Sensorium / Orientation: awake and alert
Speech: speech normal
Psych
affect normal

Assessment & Plan
Assessment/Plan
(1) Respiratory syncytial virus (RSV) infection: 
(2) Pharyngeal edema: 
PLAN: 
Plan
1.  Acute on chronic combined respiratory failure secondary to RSV infection with an overlay of COPD-patient will continue on corticosteroids, bronchodilators, and empiric antibiotics, oxygen will be weaned if possible, patient is currently on nasal 
cannula oxygen at this time

#2 pharyngeal edema-again continue present treatment, ENT is not in favor of performing a laryngoscopy on the patient due to his DNR Comfort [employer] arrest no intubation status

#3 respiratory syncytial virus infection-supportive care will continue

#4 paroxysmal atrial fibrillation-patient is on Eliquis

#5 chronic obstructive pulmonary disease-continue present medications, complicates care, medical course, recovery, and prognosis

#6 junctional rhythm-paroxysmal-patient will be observed, he is on no rate limiting agents at this time.

Total clinical time spent by myself addressing the patient's medical issues, reviewing all of his data, and collaborating with patient's care team: 35 minutes

Charges/Coding
Visit Charges
Inpatient E&M: 70982 Subs Hosp L2

## 2024-03-19 NOTE — PN.CC_ITS
Assessment & Plan    
Assessment/Plan    
(1) Respiratory syncytial virus (RSV) infection:     
(2) Acute on chronic respiratory failure with hypoxia:     
PLAN:     
Plan    
    
RECOMMENDATIONS:     
1.  Continue antibiotics to complete 7 days of therapy.    
2.  Continue scheduled bronchodilators and steroids.  Recommend prednisone taper  
at discharge.    
3.  Transition patient to regular nasal cannula supplemental O2 and wean to   
maintain saturations at or above 90%.    
4.  Recommend PAP therapy with naps and nightly.    
5.  Encourage incentive spirometer use and mobilize patient as tolerated.    
6.  Perform walking oximetry study prior to consideration for discharge home.    
7.  Follow-up with primary pulmonologist, Dr. Jin, following discharge.    
8.  Will sign off at this time.  Please call with any additional questions.    
    
IMPRESSIONS:    
1.  Acute on chronic combined respiratory failure    
Appears to be secondary to COPD exacerbation due to RSV infection.  In addition,  
the patient had pharyngeal edema noted on CT imaging at presentation, for which   
ENT evaluated the patient.  They recommended continuing antibiotics and   
steroids.  The patient appears stable from a respiratory perspective.  Recommend  
continuing to wean supplemental oxygen as tolerated to maintain saturations at   
or above 90%.  Continue antibiotics along with scheduled bronchodilators and   
steroids.  The patient will need to be scheduled with his primary pulmonologist,  
Dr. Jin, upon discharge from the hospital for follow-up.    
    
2.  Self-reported history of obstructive sleep apnea    
Although the patient has a known history of obstructive sleep apnea, his   
outpatient compliance with nocturnal PAP therapy is questionable.  For now, it   
is reasonable to continue him on PAP therapy with naps and nightly while   
admitted to the hospital.    
    
3.  Obesity/hypertension/hyperlipidemia/history of colon CA/history of   
paroxysmal atrial fibrillation    
Complicates care, management, recovery and prognosis.  Continue home medications  
as indicated.    
    
This note was generated with Dragon dictation software. It may contain incorrect  
words, spelling, and punctuation that were not noted in checking the note before  
signing.    
    
    
Subjective    
Subjective    
The patient was seen and examined at the bedside this morning.  Events from the   
last 24 hours have been reviewed.  The patient is currently afebrile,   
hemodynamically stable and maintaining appropriate oxygen saturations on heated   
high flow with an FiO2 requirement of 38% and flow rate of 50 L/min.  The   
patient is sitting in his bedside recliner and has no specific respiratory   
related complaints.    
    
    
Objective Data    
Objective Data    
The patient's most recent lab work, culture data and imaging studies have all   
been personally reviewed.  The patient was noted to be positive for RSV on March  
15.  Blood cultures have not demonstrated any growth to date.    
    
Vital Signs:     
Vital Signs    
    
    
    
Temp Pulse Resp BP Pulse Ox O2 Del Method O2 Flow Rate    
     
 98 F   83   17   150/70 H  95   Airvo   50     
     
 03/19/24 09:14  03/19/24 09:18  03/19/24 09:14  03/19/24 09:14  03/19/24 09:14   
03/19/24 09:14  03/19/24 09:14    
    
    
    
FiO2    
     
 38     
     
 03/19/24 09:14    
    
    
    
    
Oxygen Flow Rate (L/min)         50                                               
       
Oxygen Delivery Method           Airvo                                            
       
Weight:                          239 lb 6.752 oz                                  
       
Body Mass Index (BMI)            34.2                                             
       
    
    
Intake & Output:     
                       Intake and Output for Last 24 Hours    
    
    
    
 03/17/24 03/18/24 03/19/24    
    
 23:59 23:59 23:59    
     
Intake Total 2010 / 2010 660 / 660 210 / 210    
     
Output Total 1225 / 1365 1150 / 1150 425 / 425    
     
Balance 785 / 645 -490 / -490 -215 / -215    
    
    
    
Lab / Micro Data    
Attestation: I reviewed the patient's lab results.    
    
                                                        03/17/24 04:10              
    
                                                        03/17/24 04:10              
    
Labs:     
                         Laboratory Results - last 24 hr    
    
03/17/24 19:51: Vancomycin Trough 21.0 H    
    
    
Micro:     
                                  Microbiology    
    
03/18/24 11:25   Nasal Secretion   MRSA (PCR) - Final    
03/15/24 05:24   Blood Culture (Wb) - Right Forearm   Blood Culture -   
Preliminary    
                            No growth in 48 hours.    
03/15/24 05:49   Blood Culture (Wb) - Left Forearm   Blood Culture - Preliminary    
                            No growth in 48 hours.    
03/15/24 02:18   Mucosa - Nose   SARS-CoV-2, Influenza & RSV (PCR) - Final    
                            RSV    
03/15/24 02:18   Mucosa - Throat   Streptococcus pyogenes (PCR) - Final    
    
    
    
Physical Exam    
Const    
alert and no apparent distress    
Constitutional Narrative:     
Sitting in bedside recliner.    
General Appearance: cooperative    
HEENT    
normocephalic, head/scalp atraumatic and moist oral mucous membranes    
Eyes    
PERRL, EOMs intact bilaterally and conjunctivae normal    
Neck    
supple    
General: trachea midline    
Chest    
inspection of chest normal    
Resp    
normal respiratory effort    
Auscultation: diminished lung sounds; Negative for rales, rhonchi or wheezes    
Cardio    
regular rate and regular rhythm    
GI    
normal to inspection, nondistended, normoactive bowel sounds    
Extremity    
no clubbing, cyanosis or edema    
Skin    
no rashes or lesions noted    
Neuro    
CN's II-XII intact bilaterally, moves all extremities and no focal motor   
deficits    
Psych    
cooperative and affect normal    
    
Charges/Coding    
Visit Charges    
Inpatient E&M: 51728 Subs Hosp L2

## 2024-03-20 VITALS
DIASTOLIC BLOOD PRESSURE: 80 MMHG | TEMPERATURE: 98.24 F | OXYGEN SATURATION: 96 % | HEART RATE: 91 BPM | SYSTOLIC BLOOD PRESSURE: 106 MMHG | RESPIRATION RATE: 18 BRPM

## 2024-03-20 VITALS
SYSTOLIC BLOOD PRESSURE: 127 MMHG | OXYGEN SATURATION: 98 % | DIASTOLIC BLOOD PRESSURE: 88 MMHG | TEMPERATURE: 98.2 F | HEART RATE: 86 BPM | RESPIRATION RATE: 18 BRPM

## 2024-03-20 VITALS
SYSTOLIC BLOOD PRESSURE: 114 MMHG | RESPIRATION RATE: 18 BRPM | HEART RATE: 75 BPM | OXYGEN SATURATION: 94 % | TEMPERATURE: 97.6 F | DIASTOLIC BLOOD PRESSURE: 68 MMHG

## 2024-03-20 VITALS
HEART RATE: 56 BPM | SYSTOLIC BLOOD PRESSURE: 122 MMHG | RESPIRATION RATE: 24 BRPM | TEMPERATURE: 96.98 F | DIASTOLIC BLOOD PRESSURE: 61 MMHG | OXYGEN SATURATION: 93 %

## 2024-03-20 VITALS — OXYGEN SATURATION: 94 % | RESPIRATION RATE: 22 BRPM | HEART RATE: 60 BPM

## 2024-03-20 VITALS
SYSTOLIC BLOOD PRESSURE: 133 MMHG | TEMPERATURE: 97.88 F | RESPIRATION RATE: 19 BRPM | HEART RATE: 95 BPM | OXYGEN SATURATION: 95 % | DIASTOLIC BLOOD PRESSURE: 67 MMHG

## 2024-03-20 VITALS
SYSTOLIC BLOOD PRESSURE: 143 MMHG | HEART RATE: 60 BPM | DIASTOLIC BLOOD PRESSURE: 73 MMHG | OXYGEN SATURATION: 96 % | RESPIRATION RATE: 20 BRPM | TEMPERATURE: 96.6 F

## 2024-03-20 VITALS — HEART RATE: 75 BPM

## 2024-03-20 VITALS — HEART RATE: 94 BPM | SYSTOLIC BLOOD PRESSURE: 138 MMHG | DIASTOLIC BLOOD PRESSURE: 73 MMHG

## 2024-03-20 VITALS — RESPIRATION RATE: 18 BRPM | HEART RATE: 63 BPM

## 2024-03-20 VITALS — RESPIRATION RATE: 24 BRPM | HEART RATE: 72 BPM | OXYGEN SATURATION: 94 %

## 2024-03-20 VITALS — RESPIRATION RATE: 20 BRPM | HEART RATE: 72 BPM | OXYGEN SATURATION: 93 %

## 2024-03-20 VITALS — OXYGEN SATURATION: 96 %

## 2024-03-20 VITALS — HEART RATE: 110 BPM | OXYGEN SATURATION: 95 % | RESPIRATION RATE: 24 BRPM

## 2024-03-20 RX ADMIN — PIPERACILLIN SODIUM AND TAZOBACTAM SODIUM 12.5 GM: 3; .375 INJECTION, POWDER, LYOPHILIZED, FOR SOLUTION INTRAVENOUS at 05:23

## 2024-03-20 RX ADMIN — Medication 1 PACKET: at 08:51

## 2024-03-20 RX ADMIN — ACETYLCYSTEINE 800 MG: 200 SOLUTION ORAL; RESPIRATORY (INHALATION) at 06:56

## 2024-03-20 RX ADMIN — ACETYLCYSTEINE 800 MG: 200 SOLUTION ORAL; RESPIRATORY (INHALATION) at 19:49

## 2024-03-20 RX ADMIN — APIXABAN 5 MG: 5 TABLET, FILM COATED ORAL at 20:47

## 2024-03-20 RX ADMIN — PIPERACILLIN SODIUM AND TAZOBACTAM SODIUM 12.5 GM: 3; .375 INJECTION, POWDER, LYOPHILIZED, FOR SOLUTION INTRAVENOUS at 15:07

## 2024-03-20 RX ADMIN — APIXABAN 5 MG: 5 TABLET, FILM COATED ORAL at 08:51

## 2024-03-20 RX ADMIN — PANTOPRAZOLE SODIUM 330 MG: 40 INJECTION, POWDER, FOR SOLUTION INTRAVENOUS at 10:06

## 2024-03-20 NOTE — CASEMGMT
Per physician patient will go back to [facility] today. SW completed a green sheet and placed it on patient's chart.

Plan: d/c back to [facility] under intermediate level of care. Physicians will transport patient.
Nava WHITEHEAD

## 2024-03-20 NOTE — CASEMGMT
Discharge Planning
Updates sent to [facility] via [facility].
Angelic Stringer, Discharge Planning Asst.

## 2024-03-20 NOTE — PCM.DC.SUM
Providers
Date of Admission: 03/15/24
Date of Discharge: 03/20/24
Primary Care Physician: 
Dr. Julián Fuentes MD

Consultations

03/15/24 05:33
Consult: ENT Routine 
 Consulting Provider: Yosi Choi
 Reason for Consult: posterior pharnyx edema
 EMERGENT Consult: No
 MD Notified: Yes
 Date Notified: 03/15/24
 Time Notified: 05:34
 Method of Notification: Verbal

03/15/24 10:18
Consult: Intensivist / Pulmonary Medicine Routine 
 Consulting Provider: Intensivists/Pulmonary Med
 Reason for Consult: on bi-pap
 EMERGENT Consult: No
 MD Notified: Yes
 Date Notified: 03/15/24
 Time Notified: 10:18
 Method of Notification: Verbal



Reason For Visit: AE COPD

Diagnosis
Discharge Diagnosis
(1) Respiratory syncytial virus (RSV) infection: 
      Status: Acute
      Code(s):
B33.8 - Other specified viral diseases
(2) Pharyngeal edema: 
      Status: Acute
      Code(s):
J39.2 - Other diseases of pharynx

Plan
1.  Acute on chronic combined respiratory failure secondary to RSV infection with an overlay of COPD-patient will continue on corticosteroids, bronchodilators, and empiric antibiotics, oxygen will be weaned if possible, patient is currently on nasal 
cannula oxygen at this time

#2 pharyngeal edema-again continue present treatment, ENT is not in favor of performing a laryngoscopy on the patient due to his DNR Comfort Care arrest no intubation status

#3 respiratory syncytial virus infection-supportive care will continue

#4 paroxysmal atrial fibrillation-patient is on Eliquis

#5 chronic obstructive pulmonary disease-continue present medications, complicates care, medical course, recovery, and prognosis

#6 junctional rhythm-paroxysmal-patient will be observed, he is on no rate limiting agents at this time.

Total clinical time spent by myself addressing the patient's medical issues, reviewing all of his data, and collaborating with patient's care team: 35 minutes

Medications at Discharge
Home Medications

tamsulosin 0.4 mg capsule 0.4 mg PO QHS urination 10/31/13 
pramipexole 0.25 mg tablet 0.25 mg PO QHS restless legs 11/21/16 
albuterol sulfate 2.5 mg/3 mL (0.083 %) solution for nebulization 2.5 mg inhalation Q4H PRN PRN copd 05/26/19 
pantoprazole 40 mg tablet,delayed release 40 mg PO DAILY gerd 05/26/19 
roflumilast 250 mcg tablet (Daliresp) 250 mcg PO BID copd 05/26/19 
sertraline 100 mg tablet (Zoloft) 150 mg PO QHS depression 05/26/19 
ergocalciferol (vitamin D2) 1,250 mcg (50,000 unit) capsule 50,000 unit PO [facility] 12/08/19 
fluticasone fur. 100 mcg-umeclid 62.5 mcg-vilant 25 mcg inhalat.powder (Trelegy Ellipta) 1 inh inhalation DAILY COPD 08/18/21 
atorvastatin 10 mg tablet 10 mg PO QHS 08/12/22 
furosemide 40 mg tablet (Lasix) 40 mg PO DAILY 08/12/22 
psyllium 1 packet PO DAILY 08/12/22 
acetazolamide 250 mg tablet 250 mg PO BID 10/10/23 
apixaban 5 mg tablet (Eliquis) 5 mg PO BID 10/10/23 
metoprolol tartrate 50 mg tablet 50 mg PO Q12H 10/10/23 
ferrous sulfate 325 mg (65 mg iron) tablet (FeroSul) 325 mg PO BID 12/01/23 
acetaminophen 325 mg tablet 650 mg (2 x 325 mg) PO Q6H PRN PRN Pain 1-10 Or Fever >100.7 #0 tabs 12/05/23 
food supplemt, lactose-reduced 0.08 gram-1.5 kcal/mL oral liquid (Ensure Plus High Protein) 120 ml PO TIDCM #0 mL 12/05/23 
guaifenesin 1,200 mg tablet, extended release 12 hr (Mucus Relief ER) 1,200 mg PO BID #0 tabs 12/05/23 
melatonin 3 mg tablet 3 mg PO QHS PRN PRN Insomnia #0 tabs 12/05/23 
sennosides 8.6 mg-docusate sodium 50 mg tablet (Stool Softener-Stimulant Laxative) 2 tab PO BID PRN Constipation #0 tabs 12/05/23 
levofloxacin 500 mg tablet 500 mg PO DAILY #3 tabs 03/20/24 
prednisone 20 mg tablet 40 mg (2 x 20 mg) PO UD #0 tabs 03/20/24 



Hospital Course
Operations
None
Procedures
None
Summary of Care Provided
Minutes Spent on Discharge: 32
Hospital Course: 
This 78-year-old white male was seen in the emergency room at [facility] after being transported in from a skilled nursing facility due to shortness of breath.  Patient uses oxygen chronically at 3 L, he complained of increased chest 
congestion and cough over the last several days at the nursing home.  Workup in the emergency room showed his white blood count to be elevated at 19,000, CT soft tissue of the neck showed marked enlargement of the palatine tonsils and prominent soft 
tissue swelling of the hypopharyngeal structures predominantly on the right with some mass effect of the hypopharyngeal airway.  Chest x-ray showed coarse interstitial lung disease likely due to pulmonary fibrosis and and borderline cardiomegaly.  
Patient was positive for RSV.  Patient was admitted to ICU, he was placed on IV antibiotics and seen in consultation by ENT and critical care, patient was a DNR CC arrest no intubation so ENT did not feel he needed laryngoscopy.  Patient was on 
BiPAP, was transitioned into Airvo, and then finally was transferred to PCU on nasal cannula O2.  Patient progressed during his hospital stay and there were no untoward events.

On 3/20/2024, patient was seen and examined: On examination he appeared in no distress. Vital signs as documented. Skin warm and dry and without overt rashes. Neck without JVD, neck was supple, trachea midline, thyroid was normal. Lungs clear 
bilaterally, normal air movement was noted. Heart exam notable for regular rhythm, normal sounds and absence of murmurs, rubs or gallops. Abdomen unremarkable and without evidence of organomegaly, masses, or abdominal aortic enlargement.  Bowel 
sounds are present, abdomen is not distended.  Extremities nonedematous, no cyanosis was noted, no clubbing was noted.  Neuro: Cranial nerves II through XII are grossly intact, no focal motor deficits were noted, sensation to light touch and 
pinprick intact, motor exam 5/5 throughout.  Psych: Patient is alert and oriented x3, he does not appear anxious or depressed, he does not appear agitated.

Patient was felt to be stable for discharge to a local extended care facility on 3/20/2024 under intermediate care.

Weight / BMI
Weight

Weight:                        108.5 kg                                          
Body Mass Index (BMI)          34.2                                              



ABG / Lab / Microbiology Data

03/17/24 04:10          

03/17/24 04:10          

Microbiology: 
Microbiology

03/15/24 05:49   Blood Culture (Wb) - Left Forearm   Blood Culture - Final
                            No growth in 5 days.
03/15/24 05:24   Blood Culture (Wb) - Right Forearm   Blood Culture - Final
                            No growth in 5 days.
03/18/24 11:25   Nasal Secretion   MRSA (PCR) - Final
03/15/24 02:18   Mucosa - Nose   SARS-CoV-2, Influenza & RSV (PCR) - Final
                            RSV
03/15/24 02:18   Mucosa - Throat   Streptococcus pyogenes (PCR) - Final



Meaningful Use Info
Meaningful Use Diagnoses (Choose all that apply): None applicable

Discharge Plan
Admission
Admit Date/Time: 03/15/24 05:33

Primary Reason for Your Visit: Acute on chronic combined respiratory failure with RSV infection and COPD

Attending Provider: River Ferrara

Primary Care Provider: Julián Fuentes

Consulting Providers: Gómez Tesfaye; Lorena Oden; Yosi Choi

Discharge Orders/Prescriptions
Prescriptions:
New
  prednisone 20 mg Tablet 
   40 mg PO UD Qty: 0 0RF
   Rx Instructions:
   Take 1 twice a day for 3 days, then 1/day for 3 days, then stop
  levofloxacin 500 mg tablet 
   500 mg PO DAILY Qty: 3 0RF
   Rx Instructions:
   Take 1 daily for 3 days then stop medication-start on 3/21/2024

Continued
  acetazolamide 250 mg tablet 
   250 mg PO BID 
  metoprolol tartrate 50 mg tablet 
   50 mg PO Q12H 
  Eliquis 5 mg tablet 
   5 mg PO BID 
  tamsulosin 0.4 MG capsule 
   0.4 mg PO QHS 
   Patient Comments:
   voiding, enlarged prostate
  pramipexole 0.25 MG tablet 
   0.25 mg PO QHS 
  sertraline [Zoloft] 100 MG tablet 
   150 mg PO QHS 
  pantoprazole 40 MG tablet 
   40 mg PO DAILY 
  roflumilast [Daliresp] 250 MCG tablet 
   250 mcg PO BID 
   Patient Comments:
   TAKE 1 TABLET EVERY DAY
  albuterol sulfate 2.5 MG/3 ML solution for nebulization 
   2.5 mg inhalation Q4H PRN PRN (Reason: copd) 
  Trelegy Ellipta 100-62.5-25 mcg blister with device 
   1 inh INHALATION DAILY 
   Patient Comments:
   INHALE 1 PUFF BY MOUTH EVERY DAY WITH GOOD ORAL CARE
  furosemide [Lasix] 40 mg Tablet 
   40 mg PO DAILY 
  atorvastatin 10 mg Tablet 
   10 mg PO QHS 
  psyllium  Packet 
   1 packet PO DAILY 
   Rx Instructions:
   mix into at least 8 oz of water or juice before administering
  ferrous sulfate [FeroSul] 325 mg (65 mg iron) tablet 
   325 mg PO BID 
   Patient Comments:
   for 3 months. end date 01/19/2024
  sennosides-docusate sodium [Stool Softener-Stimulant Laxat] 8.6-50 mg Tablet 
   2 tab PO BID PRN (Reason: Constipation) Qty: 0 0RF
  melatonin 3 mg Tablet 
   3 mg PO QHS PRN PRN (Reason: Insomnia) Qty: 0 0RF
  guaifenesin [Mucus Relief ER] 1,200 mg Tablet Extended Release 12hr 
   1,200 mg PO BID Qty: 0 0RF
  Ensure Plus High Protein 0.08 gram-1.5 kcal/mL Liquid 
   120 ml PO TIDCM Qty: 0 0RF
  acetaminophen 325 mg Tablet 
   650 mg PO Q6H PRN PRN (Reason: Pain 1-10 Or Fever >100.7) Qty: 0 0RF

Discontinued
  clotrimazole-betamethasone 1-0.05 % cream 
   1 applic topical BID 
  albuterol sulfate [ProAir HFA] 90 MCG inhaler 
   2 puff PO Q6H PRN (Reason: Shortness Of Breath) 
  ipratropium-albuterol 0.5 mg-3 mg(2.5 mg base)/3 mL Solution For Nebulization 
   3 ml inhalation Q4H.RT Qty: 0 0RF
  azithromycin [Zithromax] 500 mg tablet 
   500 mg PO DAILY 3 Days Qty: 3 0RF
   Patient Comments:
   end 3/16/24
   
  prednisone 20 mg tablet 
   40 mg PO DAILY 
   Patient Comments:
   till 3/16/24
   

No Action
  ergocalciferol (vitamin D2) 50,000 UNIT capsule 
   50,000 unit PO FR 
   Patient Comments:
   TAKE 1 CAPSULE BY MOUTH EVERY WEEK
   Rx Instructions:
   takes on fridays

Referrals / Follow Up:
Julián Fuentes MD [Primary Care Provider] - 

Disposition
Disposition (needs filled in before D/C Order can be placed): NonSkilled NH/Intermed Care


Charges/Coding
Visit Charges
Inpatient E&M: 38900 Disch Hosp >30min

## 2024-03-20 NOTE — TREXTCAR_ITS
Diet    
Diet Order/Speech Therapy:     
                                            
    
03/16/24 14:08    
Diet: Regular - General

## 2024-03-20 NOTE — PCM.TXEXTCAR
Diet
Diet Order/Speech Therapy: 


03/16/24 14:08
Diet: Regular - General 
 Food consistency:: Easy to Chew
 Liquid Consistency:: Regular/Thin



Routine Orders/Code Status
O2 Liters per Minute: 4
O2 Frequency: Continuous
Keep PO Greater than or Equal to (%): 90
Code Status: DNRCC-A (No intubation)
Therapies
Weight Bearing: Weight bearing as tolerated
Physical Therapy: Eval and Treat
Occupational Therapy: Eval and Treat
Problem/Diagnosis
(1) Respiratory syncytial virus (RSV) infection: 
      Status: Acute
      Code(s):
B33.8 - Other specified viral diseases
(2) Pharyngeal edema: 
      Status: Acute
      Code(s):
J39.2 - Other diseases of pharynx

Plan
1.  Acute on chronic combined respiratory failure secondary to RSV infection with an overlay of COPD-patient will continue on corticosteroids, bronchodilators, and empiric antibiotics, oxygen will be weaned if possible, patient is currently on nasal 
cannula oxygen at this time

#2 pharyngeal edema-again continue present treatment, ENT is not in favor of performing a laryngoscopy on the patient due to his DNR Comfort Care arrest no intubation status

#3 respiratory syncytial virus infection-supportive care will continue

#4 paroxysmal atrial fibrillation-patient is on Eliquis

#5 chronic obstructive pulmonary disease-continue present medications, complicates care, medical course, recovery, and prognosis

#6 junctional rhythm-paroxysmal-patient will be observed, he is on no rate limiting agents at this time.

Total clinical time spent by myself addressing the patient's medical issues, reviewing all of his data, and collaborating with patient's care team: 35 minutes
Allergies/Procedures Done in Hospital
Allergies

trimethoprim [From Bactrim] Allergy (Intermediate, Verified 03/15/24 02:06)
 hot and diaphoretic
   
Sulfa (Sulfonamide Antibiotics) Allergy (Verified 03/15/24 02:06)
 Rash


Procedures: None
Type of Care/Length of Stay
Estimated LOS: More Than 30 Days
Type of Care Needed: Intermediate
Rehab Potential: Fair
Prognosis: Fair
Additional Orders/Day of Discharge
H&P will serve as current which was dated: 03/15/24
Day of Discharge: 03/20/24
Dietary and Speech Recommendations
Dietitian Recommendations/Changes: continue regular diet as tolerated - consider change diet to Cardiac once pt consistently w/ po intakes >75% at meals.

Discharge Plan
Admission
Admit Date/Time: 03/15/24 05:33

Primary Reason for Your Visit: Acute on chronic combined respiratory failure with RSV infection and COPD

Attending Provider: River Ferrara

Primary Care Provider: Julián Fuentes

Consulting Providers: Gómez Tesfaye; Lorena Oden; Yosi Choi

Discharge Orders/Prescriptions
Prescriptions:
New
  prednisone 20 mg Tablet 
   40 mg PO UD Qty: 0 0RF
   Rx Instructions:
   Take 1 twice a day for 3 days, then 1/day for 3 days, then stop
  levofloxacin 500 mg tablet 
   500 mg PO DAILY Qty: 3 0RF
   Rx Instructions:
   Take 1 daily for 3 days then stop medication-start on 3/21/2024

Continued
  acetazolamide 250 mg tablet 
   250 mg PO BID 
  metoprolol tartrate 50 mg tablet 
   50 mg PO Q12H 
  Eliquis 5 mg tablet 
   5 mg PO BID 
  tamsulosin 0.4 MG capsule 
   0.4 mg PO QHS 
   Patient Comments:
   voiding, enlarged prostate
  pramipexole 0.25 MG tablet 
   0.25 mg PO QHS 
  sertraline [Zoloft] 100 MG tablet 
   150 mg PO QHS 
  pantoprazole 40 MG tablet 
   40 mg PO DAILY 
  roflumilast [Daliresp] 250 MCG tablet 
   250 mcg PO BID 
   Patient Comments:
   TAKE 1 TABLET EVERY DAY
  albuterol sulfate 2.5 MG/3 ML solution for nebulization 
   2.5 mg inhalation Q4H PRN PRN (Reason: copd) 
  Trelegy Ellipta 100-62.5-25 mcg blister with device 
   1 inh INHALATION DAILY 
   Patient Comments:
   INHALE 1 PUFF BY MOUTH EVERY DAY WITH GOOD ORAL CARE
  furosemide [Lasix] 40 mg Tablet 
   40 mg PO DAILY 
  atorvastatin 10 mg Tablet 
   10 mg PO QHS 
  psyllium  Packet 
   1 packet PO DAILY 
   Rx Instructions:
   mix into at least 8 oz of water or juice before administering
  ferrous sulfate [FeroSul] 325 mg (65 mg iron) tablet 
   325 mg PO BID 
   Patient Comments:
   for 3 months. end date 01/19/2024
  sennosides-docusate sodium [Stool Softener-Stimulant Laxat] 8.6-50 mg Tablet 
   2 tab PO BID PRN (Reason: Constipation) Qty: 0 0RF
  melatonin 3 mg Tablet 
   3 mg PO QHS PRN PRN (Reason: Insomnia) Qty: 0 0RF
  guaifenesin [Mucus Relief ER] 1,200 mg Tablet Extended Release 12hr 
   1,200 mg PO BID Qty: 0 0RF
  Ensure Plus High Protein 0.08 gram-1.5 kcal/mL Liquid 
   120 ml PO TIDCM Qty: 0 0RF
  acetaminophen 325 mg Tablet 
   650 mg PO Q6H PRN PRN (Reason: Pain 1-10 Or Fever >100.7) Qty: 0 0RF

Discontinued
  clotrimazole-betamethasone 1-0.05 % cream 
   1 applic topical BID 
  albuterol sulfate [ProAir HFA] 90 MCG inhaler 
   2 puff PO Q6H PRN (Reason: Shortness Of Breath) 
  ipratropium-albuterol 0.5 mg-3 mg(2.5 mg base)/3 mL Solution For Nebulization 
   3 ml inhalation Q4H.RT Qty: 0 0RF
  azithromycin [Zithromax] 500 mg tablet 
   500 mg PO DAILY 3 Days Qty: 3 0RF
   Patient Comments:
   end 3/16/24
   
  prednisone 20 mg tablet 
   40 mg PO DAILY 
   Patient Comments:
   till 3/16/24
   

No Action
  ergocalciferol (vitamin D2) 50,000 UNIT capsule 
   50,000 unit PO FR 
   Patient Comments:
   TAKE 1 CAPSULE BY MOUTH EVERY WEEK
   Rx Instructions:
   takes on fridays

Referrals / Follow Up:
Julián Fuentes MD [Primary Care Provider] - 

Disposition
Disposition (needs filled in before D/C Order can be placed): NonSkilled NH/Intermed Care

## 2024-03-20 NOTE — DS.PCM_ITS
Providers    
Date of Admission: 03/15/24    
Date of Discharge: 03/20/24    
Primary Care Physician:     
Dr. Julián Fuentes MD    
    
Consultations    
    
03/15/24 05:33    
Consult: ENT Routine     
   Consulting Provider: Yosi Choi    
   Reason for Consult: posterior pharnyx edema    
   EMERGENT Consult: No    
   MD Notified: Yes    
   Date Notified: 03/15/24    
   Time Notified: 05:34    
   Method of Notification: Verbal    
    
03/15/24 10:18    
Consult: Intensivist / Pulmonary Medicine Routine     
   Consulting Provider: Intensivists/Pulmonary Med    
   Reason for Consult: on bi-pap    
   EMERGENT Consult: No    
   MD Notified: Yes    
   Date Notified: 03/15/24    
   Time Notified: 10:18    
   Method of Notification: Verbal    
    
    
    
Reason For Visit: AE COPD    
    
Diagnosis    
Discharge Diagnosis    
(1) Respiratory syncytial virus (RSV) infection:     
      Status: Acute    
      Code(s):    
B33.8 - Other specified viral diseases    
(2) Pharyngeal edema:     
      Status: Acute    
      Code(s):    
J39.2 - Other diseases of pharynx    
    
Plan    
1.  Acute on chronic combined respiratory failure secondary to RSV infection   
with an overlay of COPD-patient will continue on corticosteroids,   
bronchodilators, and empiric antibiotics, oxygen will be weaned if possible,   
patient is currently on nasal cannula oxygen at this time    
    
#2 pharyngeal edema-again continue present treatment, ENT is not in favor of   
performing a laryngoscopy on the patient due to his DNR Comfort Care arrest no   
intubation status    
    
#3 respiratory syncytial virus infection-supportive care will continue    
    
#4 paroxysmal atrial fibrillation-patient is on Eliquis    
    
#5 chronic obstructive pulmonary disease-continue present medications,   
complicates care, medical course, recovery, and prognosis    
    
#6 junctional rhythm-paroxysmal-patient will be observed, he is on no rate puentes  
iting agents at this time.    
    
Total clinical time spent by myself addressing the patient's medical issues,   
reviewing all of his data, and collaborating with patient's care team: 35   
minutes    
    
Medications at Discharge    
Home Medications    
    
tamsulosin 0.4 mg capsule 0.4 mg PO QHS urination 10/31/13     
pramipexole 0.25 mg tablet 0.25 mg PO QHS restless legs 11/21/16     
albuterol sulfate 2.5 mg/3 mL (0.083 %) solution for nebulization 2.5 mg   
inhalation Q4H PRN PRN copd 05/26/19     
pantoprazole 40 mg tablet,delayed release 40 mg PO DAILY gerd 05/26/19     
roflumilast 250 mcg tablet (Daliresp) 250 mcg PO BID copd 05/26/19     
sertraline 100 mg tablet (Zoloft) 150 mg PO QHS depression 05/26/19     
ergocalciferol (vitamin D2) 1,250 mcg (50,000 unit) capsule 50,000 unit PO [facility] 12/08/19     
fluticasone fur. 100 mcg-umeclid 62.5 mcg-vilant 25 mcg inhalat.powder (Trelegy   
Ellipta) 1 inh inhalation DAILY COPD 08/18/21     
atorvastatin 10 mg tablet 10 mg PO QHS 08/12/22     
furosemide 40 mg tablet (Lasix) 40 mg PO DAILY 08/12/22     
psyllium 1 packet PO DAILY 08/12/22     
acetazolamide 250 mg tablet 250 mg PO BID 10/10/23     
apixaban 5 mg tablet (Eliquis) 5 mg PO BID 10/10/23     
metoprolol tartrate 50 mg tablet 50 mg PO Q12H 10/10/23     
ferrous sulfate 325 mg (65 mg iron) tablet (FeroSul) 325 mg PO BID 12/01/23     
acetaminophen 325 mg tablet 650 mg (2 x 325 mg) PO Q6H PRN PRN Pain 1-10 Or   
Fever >100.7 #0 tabs 12/05/23     
food supplemt, lactose-reduced 0.08 gram-1.5 kcal/mL oral liquid (Ensure Plus   
High Protein) 120 ml PO TIDCM #0 mL 12/05/23     
guaifenesin 1,200 mg tablet, extended release 12 hr (Mucus Relief ER) 1,200 mg   
PO BID #0 tabs 12/05/23     
melatonin 3 mg tablet 3 mg PO QHS PRN PRN Insomnia #0 tabs 12/05/23     
sennosides 8.6 mg-docusate sodium 50 mg tablet (Stool Softener-Stimulant   
Laxative) 2 tab PO BID PRN Constipation #0 tabs 12/05/23     
levofloxacin 500 mg tablet 500 mg PO DAILY #3 tabs 03/20/24     
prednisone 20 mg tablet 40 mg (2 x 20 mg) PO UD #0 tabs 03/20/24     
    
    
    
Hospital Course    
Operations    
None    
Procedures    
None    
Summary of Care Provided    
Minutes Spent on Discharge: 32    
Hospital Course:     
This 78-year-old white male was seen in the emergency room at [facility] after being transported in from a skilled nursing facility due to   
shortness of breath.  Patient uses oxygen chronically at 3 L, he complained of   
increased chest congestion and cough over the last several days at the nursing   
home.  Workup in the emergency room showed his white blood count to be elevated   
at 19,000, CT soft tissue of the neck showed marked enlargement of the palatine   
tonsils and prominent soft tissue swelling of the hypopharyngeal structures   
predominantly on the right with some mass effect of the hypopharyngeal airway.    
Chest x-ray showed coarse interstitial lung disease likely due to pulmonary   
fibrosis and and borderline cardiomegaly.  Patient was positive for RSV.    
Patient was admitted to ICU, he was placed on IV antibiotics and seen in   
consultation by ENT and critical care, patient was a DNR CC arrest no intubation  
so ENT did not feel he needed laryngoscopy.  Patient was on BiPAP, was   
transitioned into Airvo, and then finally was transferred to PCU on nasal   
cannula O2.  Patient progressed during his hospital stay and there were no   
untoward events.    
    
On 3/20/2024, patient was seen and examined: On examination he appeared in no   
distress. Vital signs as documented. Skin warm and dry and without overt rashes.  
Neck without JVD, neck was supple, trachea midline, thyroid was normal. Lungs   
clear bilaterally, normal air movement was noted. Heart exam notable for regular  
rhythm, normal sounds and absence of murmurs, rubs or gallops. Abdomen   
unremarkable and without evidence of organomegaly, masses, or abdominal aortic   
enlargement.  Bowel sounds are present, abdomen is not distended.  Extremities   
nonedematous, no cyanosis was noted, no clubbing was noted.  Neuro: Cranial   
nerves II through XII are grossly intact, no focal motor deficits were noted,   
sensation to light touch and pinprick intact, motor exam 5/5 throughout.  Psych:  
Patient is alert and oriented x3, he does not appear anxious or depressed, he   
does not appear agitated.    
    
Patient was felt to be stable for discharge to a local extended care facility on  
3/20/2024 under intermediate care.    
    
Weight / BMI    
                                     Weight    
    
    
    
Weight:                        108.5 kg                                           
    
     
Body Mass Index (BMI)          34.2                                               
    
    
    
    
    
ABG / Lab / Microbiology Data    
    
                                                        03/17/24 04:10              
    
                                                        03/17/24 04:10              
    
Microbiology:     
                                  Microbiology    
    
03/15/24 05:49   Blood Culture (Wb) - Left Forearm   Blood Culture - Final    
                            No growth in 5 days.    
03/15/24 05:24   Blood Culture (Wb) - Right Forearm   Blood Culture - Final    
                            No growth in 5 days.    
03/18/24 11:25   Nasal Secretion   MRSA (PCR) - Final    
03/15/24 02:18   Mucosa - Nose   SARS-CoV-2, Influenza & RSV (PCR) - Final    
                            RSV    
03/15/24 02:18   Mucosa - Throat   Streptococcus pyogenes (PCR) - Final    
    
    
    
Meaningful Use Info    
Meaningful Use Diagnoses (Choose all that apply): None applicable    
    
Discharge Plan    
Admission    
Admit Date/Time: 03/15/24 05:33    
    
Primary Reason for Your Visit: Acute on chronic combined respiratory failure   
with RSV infection and COPD    
    
Attending Provider: River Ferrara    
    
Primary Care Provider: Julián Fuentes    
    
Consulting Providers: Gómez Tesfaye; Lorena Oden; Yosi Choi    
    
Discharge Orders/Prescriptions    
Prescriptions:    
New    
  prednisone 20 mg Tablet     
   40 mg PO UD Qty: 0 0RF    
   Rx Instructions:    
   Take 1 twice a day for 3 days, then 1/day for 3 days, then stop    
  levofloxacin 500 mg tablet     
   500 mg PO DAILY Qty: 3 0RF    
   Rx Instructions:    
   Take 1 daily for 3 days then stop medication-start on 3/21/2024    
    
Continued    
  acetazolamide 250 mg tablet     
   250 mg PO BID     
  metoprolol tartrate 50 mg tablet     
   50 mg PO Q12H     
  Eliquis 5 mg tablet     
   5 mg PO BID     
  tamsulosin 0.4 MG capsule     
   0.4 mg PO QHS     
   Patient Comments:    
   voiding, enlarged prostate    
  pramipexole 0.25 MG tablet     
   0.25 mg PO QHS     
  sertraline [Zoloft] 100 MG tablet     
   150 mg PO QHS     
  pantoprazole 40 MG tablet     
   40 mg PO DAILY     
  roflumilast [Daliresp] 250 MCG tablet     
   250 mcg PO BID     
   Patient Comments:    
   TAKE 1 TABLET EVERY DAY    
  albuterol sulfate 2.5 MG/3 ML solution for nebulization     
   2.5 mg inhalation Q4H PRN PRN (Reason: copd)     
  Trelegy Ellipta 100-62.5-25 mcg blister with device     
   1 inh INHALATION DAILY     
   Patient Comments:    
   INHALE 1 PUFF BY MOUTH EVERY DAY WITH GOOD ORAL CARE    
  furosemide [Lasix] 40 mg Tablet     
   40 mg PO DAILY     
  atorvastatin 10 mg Tablet     
   10 mg PO QHS     
  psyllium  Packet     
   1 packet PO DAILY     
   Rx Instructions:    
   mix into at least 8 oz of water or juice before administering    
  ferrous sulfate [FeroSul] 325 mg (65 mg iron) tablet     
   325 mg PO BID     
   Patient Comments:    
   for 3 months. end date 01/19/2024    
  sennosides-docusate sodium [Stool Softener-Stimulant Laxat] 8.6-50 mg Tablet     
   2 tab PO BID PRN (Reason: Constipation) Qty: 0 0RF    
  melatonin 3 mg Tablet     
   3 mg PO QHS PRN PRN (Reason: Insomnia) Qty: 0 0RF    
  guaifenesin [Mucus Relief ER] 1,200 mg Tablet Extended Release 12hr     
   1,200 mg PO BID Qty: 0 0RF    
  Ensure Plus High Protein 0.08 gram-1.5 kcal/mL Liquid     
   120 ml PO TIDCM Qty: 0 0RF    
  acetaminophen 325 mg Tablet     
   650 mg PO Q6H PRN PRN (Reason: Pain 1-10 Or Fever >100.7) Qty: 0 0RF    
    
Discontinued    
  clotrimazole-betamethasone 1-0.05 % cream     
   1 applic topical BID     
  albuterol sulfate [ProAir HFA] 90 MCG inhaler     
   2 puff PO Q6H PRN (Reason: Shortness Of Breath)     
  ipratropium-albuterol 0.5 mg-3 mg(2.5 mg base)/3 mL Solution For Nebulization     
   3 ml inhalation Q4H.RT Qty: 0 0RF    
  azithromycin [Zithromax] 500 mg tablet     
   500 mg PO DAILY 3 Days Qty: 3 0RF    
   Patient Comments:    
   end 3/16/24    
       
  prednisone 20 mg tablet     
   40 mg PO DAILY     
   Patient Comments:    
   till 3/16/24    
       
    
No Action    
  ergocalciferol (vitamin D2) 50,000 UNIT capsule     
   50,000 unit PO FR     
   Patient Comments:    
   TAKE 1 CAPSULE BY MOUTH EVERY WEEK    
   Rx Instructions:    
   takes on fridays    
    
Referrals / Follow Up:    
Julián Fuentes MD [Primary Care Provider] -     
    
Disposition    
Disposition (needs filled in before D/C Order can be placed): NonSkilled   
[facility]    
    
    
Charges/Coding    
Visit Charges    
Inpatient E&M: 55624 Disch Hosp >30min